# Patient Record
Sex: MALE | Race: WHITE | NOT HISPANIC OR LATINO | URBAN - METROPOLITAN AREA
[De-identification: names, ages, dates, MRNs, and addresses within clinical notes are randomized per-mention and may not be internally consistent; named-entity substitution may affect disease eponyms.]

---

## 2019-11-08 LAB
CHOLEST SERPL-MCNC: 250 MG/DL (ref 0–199)
HDLC SERPL-MCNC: 80 MG/DL
LDLC SERPL CALC-MCNC: 146 MG/DL (ref 0–130)
TRIGL SERPL-MCNC: 118 MG/DL (ref 0–149)

## 2020-02-12 ENCOUNTER — OFFICE VISIT (OUTPATIENT)
Dept: FAMILY MEDICINE | Facility: CLINIC | Age: 45
End: 2020-02-12

## 2020-02-12 VITALS
OXYGEN SATURATION: 99 % | RESPIRATION RATE: 16 BRPM | BODY MASS INDEX: 29.55 KG/M2 | HEIGHT: 73 IN | HEART RATE: 118 BPM | DIASTOLIC BLOOD PRESSURE: 82 MMHG | WEIGHT: 223 LBS | SYSTOLIC BLOOD PRESSURE: 146 MMHG

## 2020-02-12 DIAGNOSIS — B37.2 CANDIDIASIS OF SKIN: Primary | ICD-10-CM

## 2020-02-12 DIAGNOSIS — R10.32 ABDOMINAL PAIN, LEFT LOWER QUADRANT: ICD-10-CM

## 2020-02-12 LAB — KOH PREP: POSITIVE

## 2020-02-12 PROCEDURE — 99214 OFFICE O/P EST MOD 30 MIN: CPT | Mod: 25 | Performed by: NURSE PRACTITIONER

## 2020-02-12 PROCEDURE — 87220 TISSUE EXAM FOR FUNGI: CPT | Performed by: NURSE PRACTITIONER

## 2020-02-12 RX ORDER — KETOCONAZOLE 20 MG/ML
SHAMPOO TOPICAL DAILY PRN
Qty: 120 ML | Refills: 3 | Status: SHIPPED | OUTPATIENT
Start: 2020-02-12 | End: 2022-10-06

## 2020-02-12 RX ORDER — FLUCONAZOLE 200 MG/1
200 TABLET ORAL DAILY
Qty: 4 TABLET | Refills: 0 | Status: SHIPPED | OUTPATIENT
Start: 2020-02-12 | End: 2022-10-06

## 2020-02-12 ASSESSMENT — ANXIETY QUESTIONNAIRES
IF YOU CHECKED OFF ANY PROBLEMS ON THIS QUESTIONNAIRE, HOW DIFFICULT HAVE THESE PROBLEMS MADE IT FOR YOU TO DO YOUR WORK, TAKE CARE OF THINGS AT HOME, OR GET ALONG WITH OTHER PEOPLE: NOT DIFFICULT AT ALL
6. BECOMING EASILY ANNOYED OR IRRITABLE: NOT AT ALL
2. NOT BEING ABLE TO STOP OR CONTROL WORRYING: SEVERAL DAYS
7. FEELING AFRAID AS IF SOMETHING AWFUL MIGHT HAPPEN: NOT AT ALL
3. WORRYING TOO MUCH ABOUT DIFFERENT THINGS: SEVERAL DAYS
GAD7 TOTAL SCORE: 3
5. BEING SO RESTLESS THAT IT IS HARD TO SIT STILL: NOT AT ALL
1. FEELING NERVOUS, ANXIOUS, OR ON EDGE: SEVERAL DAYS

## 2020-02-12 ASSESSMENT — MIFFLIN-ST. JEOR: SCORE: 1950.4

## 2020-02-12 ASSESSMENT — PATIENT HEALTH QUESTIONNAIRE - PHQ9
5. POOR APPETITE OR OVEREATING: NOT AT ALL
SUM OF ALL RESPONSES TO PHQ QUESTIONS 1-9: 6

## 2020-02-12 NOTE — PROGRESS NOTES
"Problem(s) Oriented visit        SUBJECTIVE:                                                    Hector Ramirez is a 44 year old male who presents to clinic today for the following health issues :    Seen for open wound on scalp after surgery for hair transplant over one year ago. Pt. Reports the wound never healed well and continues to ooze blood and purulent fluid.Reports the are is tender to the touch and has been painful for the past year. No warmth or swelling, no fever, chills or other skin eruptions.     Pain in lower left abdominal quad accompanied by diarrhea, bloating and malaise, reports this has been recurrent for about 9 months, he has had some boughts of overindulging in food which seems to trigger the incidents.No changes in BM patterns, no constipation, no nausea or vomiting, no melena, no hematochezia,    No fevers, no chills.   No changes in appetite or intake; no dysphagia.  No new or worsened GERD or heartburn symptoms.      No urinary sx.       Problem list, Medication list, Allergies, and Medical/Social/Surgical histories reviewed in [facility] and updated as appropriate.   Additional history: as documented    ROS:  10 point ROS completed and negative except noted above, including Gen, HEENT, CV, Resp, GI, , MS, Neurologic, Psych    Histories:   There is no problem list on file for this patient.    No past surgical history on file.    Social History     Tobacco Use     Smoking status: Never Smoker     Smokeless tobacco: Never Used   Substance Use Topics     Alcohol use: Not on file     No family history on file.        OBJECTIVE:                                                    BP (!) 146/82   Pulse 118   Resp 16   Ht 1.854 m (6' 1\")   Wt 101.2 kg (223 lb)   SpO2 99%   BMI 29.42 kg/m    Body mass index is 29.42 kg/m .   APPEARANCE: = Relaxed and in no distress  Conj/Eyelids = noninjected and lids and lashes are without inflammation  PERRLA/Irises = Pupils Round Reactive to Light and Irisis " without inflammation  Heart Rate, Rhythm, & sounds (no Murm)  = Regular rate and rhythm with no S3, S4, or murmur appreciated.  Abdomen = Soft, tender to palpation in LLQ, no masses, & bowel sounds in all quadrants  Liver/Spleen = Normal span and no splenomegaly noted  SKIN: Unhealing wound / scar tissue on occipital scalp. Crusted with dark yellow exudate, positive for fungus on KOH. Purulent exudate beneath crusting scabs.    Recent/Remote Memory = Alert and Oriented x 3  Mood/Affect = Cooperative and interested     ASSESSMENT/PLAN:                                                        Hector was seen today for new patient, derm problem and abdominal pain.    Diagnoses and all orders for this visit:    Candidiasis of skin  -     fluconazole (DIFLUCAN) 200 MG tablet; Take 1 tablet (200 mg) by mouth daily  -     ketoconazole (NIZORAL) 2 % external shampoo; Apply topically daily as needed for itching or irritation  -     KOH (RMG)  -     MRSA MSSA Culture Screen ([facility])  I will send the wound for culture and let you know about any positive results. Please use the ketoconazole shampoo twice a week and take the fluconazole once a week for the next four weeks. Please check in with me after four weeks so we can assess how the treatment is going.     Abdominal pain, left lower quadrant  -     Referral to [facility]  The symptoms and exam appear to be most consistent with diverticulitis.    Review the pathophysiology and anatomical/mechanical issues of diverticulosis and diverticulitis.  At this time, an Abd CT scan is indicated.  Low residue diet for 2-3 weeks, then go towards higher fiber diet to prevent future recurrences.   Told to avoid smaller harder foods (e.g. nuts, popcorn, seeds) in the future.  Patient was told to follow up with [facility] for a CT ,we will call with results.     ASSESSMENT/PLAN:       The following health maintenance items are reviewed in Epic and correct as of today:  Health  Maintenance   Topic Date Due     PREVENTIVE CARE VISIT  1974     DTAP/TDAP/TD IMMUNIZATION (1 - Tdap) 05/20/1985     HIV SCREENING  05/20/1989     LIPID  05/20/2009     PHQ-2  01/01/2020     INFLUENZA VACCINE  Completed     IPV IMMUNIZATION  Aged Out     MENINGITIS IMMUNIZATION  Aged Out       Debi Olmos NP  [facility]  Family Practice  [facility]  [phone]    For any issues my office # is [phone]

## 2020-02-12 NOTE — PATIENT INSTRUCTIONS
Fluconazole Oral tablet  What is this medicine?  FLUCONAZOLE (floo ALBERTINA na zole) is an antifungal medicine. It is used to treat certain kinds of fungal or yeast infections.  This medicine may be used for other purposes; ask your health care provider or pharmacist if you have questions.  What should I tell my health care provider before I take this medicine?  They need to know if you have any of these conditions:    history of irregular heart beat    kidney disease    an unusual or allergic reaction to fluconazole, other azole antifungals, medicines, foods, dyes, or preservatives    pregnant or trying to get pregnant    breast-feeding  How should I use this medicine?  Take this medicine by mouth. Follow the directions on the prescription label. Do not take your medicine more often than directed.  Talk to your pediatrician regarding the use of this medicine in children. Special care may be needed. This medicine has been used in children as young as 6 months of age.  Overdosage: If you think you have taken too much of this medicine contact a poison control center or emergency room at once.  NOTE: This medicine is only for you. Do not share this medicine with others.  What if I miss a dose?  If you miss a dose, take it as soon as you can. If it is almost time for your next dose, take only that dose. Do not take double or extra doses.  What may interact with this medicine?  Do not take this medicine with any of the following medications:    astemizole    certain medicines for irregular heart beat like dofetilide, dronedarone, quinidine    cisapride    erythromycin    lomitapide    other medicines that prolong the QT interval (cause an abnormal heart rhythm)    pimozide    terfenadine    thioridazine    tolvaptan    ziprasidone  This medicine may also interact with the following medications:    antiviral medicines for HIV or AIDS    birth control pills    certain antibiotics like rifabutin, rifampin    certain  medicines for blood pressure like amlodipine, isradipine, felodipine, hydrochlorothiazide, losartan, nifedipine    certain medicines for cancer like cyclophosphamide, vinblastine, vincristine    certain medicines for cholesterol like atorvastatin, lovastatin, fluvastatin, simvastatin    certain medicines for depression, anxiety, or psychotic disturbances like amitriptyline, midazolam, nortriptyline, triazolam    certain medicines for diabetes like glipizide, glyburide, tolbutamide    certain medicines for pain like alfentanil, fentanyl, methadone    certain medicines for seizures like carbamazepine, phenytoin    certain medicines that treat or prevent blood clots like warfarin    halofantrine    medicines that lower your chance of fighting infection like cyclosporine, prednisone, tacrolimus    NSAIDS, medicines for pain and inflammation, like celecoxib, diclofenac, flurbiprofen, ibuprofen, meloxicam, naproxen    other medicines for fungal infections    sirolimus    theophylline    tofacitinib  This list may not describe all possible interactions. Give your health care provider a list of all the medicines, herbs, non-prescription drugs, or dietary supplements you use. Also tell them if you smoke, drink alcohol, or use illegal drugs. Some items may interact with your medicine.  What should I watch for while using this medicine?  Visit your doctor or health care professional for regular checkups. If you are taking this medicine for a long time you may need blood work. Tell your doctor if your symptoms do not improve. Some fungal infections need many weeks or months of treatment to cure.  Alcohol can increase possible damage to your liver. Avoid alcoholic drinks.  If you have a vaginal infection, do not have sex until you have finished your treatment. You can wear a sanitary napkin. Do not use tampons. Wear freshly washed cotton, not synthetic, panties.  What side effects may I notice from receiving this medicine?  Side  effects that you should report to your doctor or health care professional as soon as possible:    allergic reactions like skin rash or itching, hives, swelling of the lips, mouth, tongue, or throat    dark urine    feeling dizzy or faint    irregular heartbeat or chest pain    redness, blistering, peeling or loosening of the skin, including inside the mouth    trouble breathing    unusual bruising or bleeding    vomiting    yellowing of the eyes or skin  Side effects that usually do not require medical attention (report to your doctor or health care professional if they continue or are bothersome):    changes in how food tastes    diarrhea    headache    stomach upset or nausea  This list may not describe all possible side effects. Call your doctor for medical advice about side effects. You may report side effects to FDA at 2-070-FDA-3904.  Where should I keep my medicine?  Keep out of the reach of children.  Store at room temperature below 30 degrees C (86 degrees F). Throw away any medicine after the expiration date.  NOTE:This sheet is a summary. It may not cover all possible information. If you have questions about this medicine, talk to your doctor, pharmacist, or health care provider. Copyright  2016 Gold Standard

## 2020-02-13 ASSESSMENT — ANXIETY QUESTIONNAIRES: GAD7 TOTAL SCORE: 3

## 2020-02-15 LAB
ANTIMICROBIAL SUSCEPTIBILITY: ABNORMAL
Lab: ABNORMAL
MRSA SCREENING CULTURE: ABNORMAL

## 2020-02-17 ENCOUNTER — TELEPHONE (OUTPATIENT)
Dept: FAMILY MEDICINE | Facility: CLINIC | Age: 45
End: 2020-02-17

## 2020-02-17 DIAGNOSIS — B95.8 STAPH SKIN INFECTION: Primary | ICD-10-CM

## 2020-02-17 DIAGNOSIS — L08.9 STAPH SKIN INFECTION: Primary | ICD-10-CM

## 2020-02-17 RX ORDER — SULFAMETHOXAZOLE/TRIMETHOPRIM 800-160 MG
1 TABLET ORAL 2 TIMES DAILY
Qty: 28 TABLET | Refills: 0 | Status: SHIPPED | OUTPATIENT
Start: 2020-02-17 | End: 2020-03-02

## 2020-02-17 NOTE — TELEPHONE ENCOUNTER
Called patient with result of culture from scalp.  Informed him it showed a staph infection and fungal infection. A prescription was sent to his pharmacy for Bactrim DS.  Patient will take this and follow up with another appointment when he finishes it.

## 2020-02-17 NOTE — PROGRESS NOTES
[facility] Atten: PA department @ [phone]    Esa Field,   [facility]  [phone]

## 2020-02-21 ENCOUNTER — TRANSFERRED RECORDS (OUTPATIENT)
Dept: FAMILY MEDICINE | Facility: CLINIC | Age: 45
End: 2020-02-21

## 2020-11-16 ENCOUNTER — HEALTH MAINTENANCE LETTER (OUTPATIENT)
Age: 45
End: 2020-11-16

## 2020-11-20 ENCOUNTER — TRANSFERRED RECORDS (OUTPATIENT)
Dept: FAMILY MEDICINE | Facility: CLINIC | Age: 45
End: 2020-11-20

## 2020-12-28 ENCOUNTER — TRANSFERRED RECORDS (OUTPATIENT)
Dept: FAMILY MEDICINE | Facility: CLINIC | Age: 45
End: 2020-12-28

## 2021-09-18 ENCOUNTER — HEALTH MAINTENANCE LETTER (OUTPATIENT)
Age: 46
End: 2021-09-18

## 2021-12-02 ENCOUNTER — TRANSFERRED RECORDS (OUTPATIENT)
Dept: HEALTH INFORMATION MANAGEMENT | Facility: CLINIC | Age: 46
End: 2021-12-02

## 2022-01-08 ENCOUNTER — HEALTH MAINTENANCE LETTER (OUTPATIENT)
Age: 47
End: 2022-01-08

## 2022-10-05 ENCOUNTER — HOSPITAL ENCOUNTER (EMERGENCY)
Facility: CLINIC | Age: 47
Discharge: HOME | End: 2022-10-05
Payer: COMMERCIAL

## 2022-10-05 ENCOUNTER — APPOINTMENT (OUTPATIENT)
Dept: CT IMAGING | Facility: CLINIC | Age: 47
DRG: 439 | End: 2022-10-05
Attending: EMERGENCY MEDICINE
Payer: COMMERCIAL

## 2022-10-05 ENCOUNTER — OFFICE VISIT (OUTPATIENT)
Dept: URGENT CARE | Facility: URGENT CARE | Age: 47
End: 2022-10-05
Payer: COMMERCIAL

## 2022-10-05 VITALS
BODY MASS INDEX: 29.42 KG/M2 | OXYGEN SATURATION: 96 % | SYSTOLIC BLOOD PRESSURE: 110 MMHG | HEIGHT: 73 IN | TEMPERATURE: 98.6 F | DIASTOLIC BLOOD PRESSURE: 78 MMHG | HEART RATE: 117 BPM | WEIGHT: 222 LBS | RESPIRATION RATE: 16 BRPM

## 2022-10-05 DIAGNOSIS — R10.31 RLQ ABDOMINAL PAIN: ICD-10-CM

## 2022-10-05 DIAGNOSIS — R11.0 NAUSEA: Primary | ICD-10-CM

## 2022-10-05 PROBLEM — E78.00 PURE HYPERCHOLESTEROLEMIA: Status: ACTIVE | Noted: 2019-11-11

## 2022-10-05 PROBLEM — I10 ESSENTIAL HYPERTENSION: Status: ACTIVE | Noted: 2021-09-28

## 2022-10-05 PROBLEM — K58.2 IRRITABLE BOWEL SYNDROME WITH BOTH CONSTIPATION AND DIARRHEA: Status: ACTIVE | Noted: 2021-09-28

## 2022-10-05 PROBLEM — G47.00 INSOMNIA: Status: ACTIVE | Noted: 2021-09-28

## 2022-10-05 PROBLEM — F43.9 STRESS: Status: ACTIVE | Noted: 2021-09-28

## 2022-10-05 PROBLEM — Z78.9 HEPATITIS B IMMUNE: Status: ACTIVE | Noted: 2019-11-08

## 2022-10-05 LAB
ALBUMIN SERPL-MCNC: 3.5 G/DL (ref 3.4–5)
ALBUMIN UR-MCNC: 100 MG/DL
ALP SERPL-CCNC: 81 U/L (ref 40–150)
ALT SERPL W P-5'-P-CCNC: 194 U/L (ref 0–70)
ANION GAP SERPL CALCULATED.3IONS-SCNC: 7 MMOL/L (ref 3–14)
APPEARANCE UR: ABNORMAL
AST SERPL W P-5'-P-CCNC: 212 U/L (ref 0–45)
BASOPHILS # BLD AUTO: 0 10E3/UL (ref 0–0.2)
BASOPHILS NFR BLD AUTO: 0 %
BILIRUB SERPL-MCNC: 1.9 MG/DL (ref 0.2–1.3)
BILIRUB UR QL STRIP: ABNORMAL
BUN SERPL-MCNC: 14 MG/DL (ref 7–30)
CALCIUM SERPL-MCNC: 8.8 MG/DL (ref 8.5–10.1)
CHLORIDE BLD-SCNC: 99 MMOL/L (ref 94–109)
CO2 SERPL-SCNC: 29 MMOL/L (ref 20–32)
COLOR UR AUTO: ABNORMAL
CREAT SERPL-MCNC: 1.07 MG/DL (ref 0.66–1.25)
EOSINOPHIL # BLD AUTO: 0 10E3/UL (ref 0–0.7)
EOSINOPHIL NFR BLD AUTO: 0 %
ERYTHROCYTE [DISTWIDTH] IN BLOOD BY AUTOMATED COUNT: 12.8 % (ref 10–15)
GFR SERPL CREATININE-BSD FRML MDRD: 86 ML/MIN/1.73M2
GLUCOSE BLD-MCNC: 144 MG/DL (ref 70–99)
GLUCOSE UR STRIP-MCNC: 30 MG/DL
HCT VFR BLD AUTO: 48.9 % (ref 40–53)
HGB BLD-MCNC: 16.7 G/DL (ref 13.3–17.7)
HGB UR QL STRIP: NEGATIVE
IMM GRANULOCYTES # BLD: 0 10E3/UL
IMM GRANULOCYTES NFR BLD: 0 %
KETONES UR STRIP-MCNC: 20 MG/DL
LEUKOCYTE ESTERASE UR QL STRIP: NEGATIVE
LIPASE SERPL-CCNC: 1486 U/L (ref 73–393)
LYMPHOCYTES # BLD AUTO: 2 10E3/UL (ref 0.8–5.3)
LYMPHOCYTES NFR BLD AUTO: 23 %
MCH RBC QN AUTO: 33.2 PG (ref 26.5–33)
MCHC RBC AUTO-ENTMCNC: 34.2 G/DL (ref 31.5–36.5)
MCV RBC AUTO: 97 FL (ref 78–100)
MONOCYTES # BLD AUTO: 0.6 10E3/UL (ref 0–1.3)
MONOCYTES NFR BLD AUTO: 7 %
MUCOUS THREADS #/AREA URNS LPF: PRESENT /LPF
NEUTROPHILS # BLD AUTO: 5.9 10E3/UL (ref 1.6–8.3)
NEUTROPHILS NFR BLD AUTO: 70 %
NITRATE UR QL: NEGATIVE
NRBC # BLD AUTO: 0 10E3/UL
NRBC BLD AUTO-RTO: 0 /100
PH UR STRIP: 6 [PH] (ref 5–7)
PLATELET # BLD AUTO: 196 10E3/UL (ref 150–450)
POTASSIUM BLD-SCNC: 3.6 MMOL/L (ref 3.4–5.3)
PROT SERPL-MCNC: 7.2 G/DL (ref 6.8–8.8)
RBC # BLD AUTO: 5.03 10E6/UL (ref 4.4–5.9)
RBC URINE: 2 /HPF
SODIUM SERPL-SCNC: 135 MMOL/L (ref 133–144)
SP GR UR STRIP: 1.02 (ref 1–1.03)
UROBILINOGEN UR STRIP-MCNC: 2 MG/DL
WBC # BLD AUTO: 8.4 10E3/UL (ref 4–11)
WBC URINE: 7 /HPF

## 2022-10-05 PROCEDURE — 82040 ASSAY OF SERUM ALBUMIN: CPT | Performed by: EMERGENCY MEDICINE

## 2022-10-05 PROCEDURE — 250N000009 HC RX 250: Performed by: EMERGENCY MEDICINE

## 2022-10-05 PROCEDURE — 96374 THER/PROPH/DIAG INJ IV PUSH: CPT

## 2022-10-05 PROCEDURE — 250N000011 HC RX IP 250 OP 636: Performed by: EMERGENCY MEDICINE

## 2022-10-05 PROCEDURE — 83690 ASSAY OF LIPASE: CPT | Performed by: EMERGENCY MEDICINE

## 2022-10-05 PROCEDURE — 99207 PR INPT ADMISSION FROM CLINIC: CPT | Performed by: NURSE PRACTITIONER

## 2022-10-05 PROCEDURE — 96376 TX/PRO/DX INJ SAME DRUG ADON: CPT

## 2022-10-05 PROCEDURE — C9803 HOPD COVID-19 SPEC COLLECT: HCPCS

## 2022-10-05 PROCEDURE — 96361 HYDRATE IV INFUSION ADD-ON: CPT

## 2022-10-05 PROCEDURE — 81001 URINALYSIS AUTO W/SCOPE: CPT | Performed by: EMERGENCY MEDICINE

## 2022-10-05 PROCEDURE — 36415 COLL VENOUS BLD VENIPUNCTURE: CPT | Performed by: EMERGENCY MEDICINE

## 2022-10-05 PROCEDURE — 99285 EMERGENCY DEPT VISIT HI MDM: CPT | Mod: 25

## 2022-10-05 PROCEDURE — 83735 ASSAY OF MAGNESIUM: CPT | Performed by: INTERNAL MEDICINE

## 2022-10-05 PROCEDURE — 80053 COMPREHEN METABOLIC PANEL: CPT | Performed by: EMERGENCY MEDICINE

## 2022-10-05 PROCEDURE — 96375 TX/PRO/DX INJ NEW DRUG ADDON: CPT

## 2022-10-05 PROCEDURE — 84100 ASSAY OF PHOSPHORUS: CPT | Performed by: INTERNAL MEDICINE

## 2022-10-05 PROCEDURE — 74177 CT ABD & PELVIS W/CONTRAST: CPT

## 2022-10-05 PROCEDURE — 85025 COMPLETE CBC W/AUTO DIFF WBC: CPT | Performed by: EMERGENCY MEDICINE

## 2022-10-05 RX ORDER — IOPAMIDOL 755 MG/ML
112 INJECTION, SOLUTION INTRAVASCULAR ONCE
Status: COMPLETED | OUTPATIENT
Start: 2022-10-05 | End: 2022-10-05

## 2022-10-05 RX ORDER — ZOLPIDEM TARTRATE 10 MG/1
10 TABLET ORAL
COMMUNITY
Start: 2022-09-12 | End: 2022-12-06

## 2022-10-05 RX ORDER — METOPROLOL SUCCINATE 50 MG/1
50 TABLET, EXTENDED RELEASE ORAL AT BEDTIME
COMMUNITY
Start: 2022-08-01 | End: 2022-11-08

## 2022-10-05 RX ORDER — KETOROLAC TROMETHAMINE 15 MG/ML
15 INJECTION, SOLUTION INTRAMUSCULAR; INTRAVENOUS ONCE
Status: COMPLETED | OUTPATIENT
Start: 2022-10-05 | End: 2022-10-05

## 2022-10-05 RX ORDER — ONDANSETRON 2 MG/ML
4 INJECTION INTRAMUSCULAR; INTRAVENOUS ONCE
Status: COMPLETED | OUTPATIENT
Start: 2022-10-05 | End: 2022-10-05

## 2022-10-05 RX ORDER — ONDANSETRON 4 MG/1
4 TABLET, ORALLY DISINTEGRATING ORAL ONCE
Status: DISCONTINUED | OUTPATIENT
Start: 2022-10-05 | End: 2022-10-05

## 2022-10-05 RX ADMIN — ONDANSETRON 4 MG: 2 INJECTION INTRAMUSCULAR; INTRAVENOUS at 18:50

## 2022-10-05 RX ADMIN — KETOROLAC TROMETHAMINE 15 MG: 15 INJECTION, SOLUTION INTRAMUSCULAR; INTRAVENOUS at 18:51

## 2022-10-05 RX ADMIN — IOPAMIDOL 112 ML: 755 INJECTION, SOLUTION INTRAVENOUS at 21:39

## 2022-10-05 RX ADMIN — SODIUM CHLORIDE 72 ML: 9 INJECTION, SOLUTION INTRAVENOUS at 21:39

## 2022-10-05 RX ADMIN — ONDANSETRON 4 MG: 4 TABLET, ORALLY DISINTEGRATING ORAL at 16:46

## 2022-10-05 NOTE — ED NOTES
Rapid Assessment Note    History:   Hector Ramirez is a 47 year old male who presents with RLQ pain x2 days. Subjective fever and nausea. H/o kidney stones but this feels different. Pain radiates into groin but does not originate from there.     Exam:   General:  Alert, interactive  Cardiovascular:  Well perfused  Lungs:  No respiratory distress, no accessory muscle use  Neuro:  Moving all 4 extremities  Skin:  Warm, dry  Psych:  Normal affect    Plan of Care:   I evaluated the patient and developed an initial plan of care. I discussed this plan and explained that I, or one of my partners, would be returning to complete the evaluation.       10/5/2022  [employer]    Portions of this medical record were completed by a scribe. UPON MY REVIEW AND AUTHENTICATION BY ELECTRONIC SIGNATURE, this confirms (a) I performed the applicable clinical services, and (b) the record is accurate.        Elbert Rivas MD  10/05/22 9500

## 2022-10-05 NOTE — PROGRESS NOTES
"Chief Complaint   Patient presents with     Urgent Care     Abdominal Pain     Last night Pt took Ambien and metoprolol and has abdominal pain right on the belly.     SUBJECTIVE:  Hector Ramirez is a 47 year old male presenting with severe umbilical, RLQ abdominal pain, nausea, vomiting, clammy, shaky since last night. His symptoms are worsening, he could not eat anything today. BM 2 days ago. Has baseline IBS, no relevant surgical history. He actually went to the ER yesterday and left due to wait times.    No past medical history on file.  bismuth subsalicylate (PEPTO BISMOL) 262 MG/15ML suspension, Take 15 mLs by mouth every 6 hours as needed for indigestion  metoprolol succinate ER (TOPROL XL) 50 MG 24 hr tablet,   zolpidem (AMBIEN) 10 MG tablet, TAKE 1 TABLET BY MOUTH EVERY DAY AT BEDTIME. USE NO MORE THAN 4 DAYS A WEEK  fluconazole (DIFLUCAN) 200 MG tablet, Take 1 tablet (200 mg) by mouth daily  ketoconazole (NIZORAL) 2 % external shampoo, Apply topically daily as needed for itching or irritation    No current facility-administered medications on file prior to visit.    Social History     Tobacco Use     Smoking status: Never Smoker     Smokeless tobacco: Never Used   Substance Use Topics     Alcohol use: Not on file     No Known Allergies    Review of Systems   All systems negative except for those listed above in HPI.    OBJECTIVE:   /78   Pulse 117   Temp 98.6  F (37  C) (Oral)   Resp 16   Ht 1.854 m (6' 1\")   Wt 100.7 kg (222 lb)   SpO2 96%   BMI 29.29 kg/m       Physical Exam  Vitals reviewed.   Constitutional:       Appearance: Normal appearance. He is ill-appearing and diaphoretic.   HENT:      Head: Normocephalic and atraumatic.      Nose: Nose normal.      Mouth/Throat:      Mouth: Mucous membranes are moist.      Pharynx: Oropharynx is clear.   Eyes:      Extraocular Movements: Extraocular movements intact.      Conjunctiva/sclera: Conjunctivae normal.      Pupils: Pupils are equal, " round, and reactive to light.   Cardiovascular:      Rate and Rhythm: Normal rate.   Pulmonary:      Effort: Pulmonary effort is normal.   Abdominal:      General: Abdomen is flat. There is no distension.      Palpations: Abdomen is soft. There is no mass.      Tenderness: There is abdominal tenderness. There is no right CVA tenderness, left CVA tenderness, guarding or rebound.      Hernia: No hernia is present.   Musculoskeletal:         General: Normal range of motion.      Cervical back: Normal range of motion and neck supple.   Skin:     General: Skin is warm.      Coloration: Skin is pale.      Findings: No rash.   Neurological:      General: No focal deficit present.      Mental Status: He is alert and oriented to person, place, and time.   Psychiatric:         Mood and Affect: Mood normal.         Behavior: Behavior normal.       ASSESSMENT:    ICD-10-CM    1. Nausea  R11.0 ondansetron (ZOFRAN ODT) ODT tab 4 mg   2. RLQ abdominal pain  R10.31      PLAN:     Traiged to ER for higher level of care with concern for appendicitis  Pt has had severe umbilical, RLQ abdominal pain, nausea, vomiting, clammy since last night  Tachycardia, shaky, diaphoretic in clinic  Positive obturator, psoas, rosvings, mcburneys signs  Urgent care limited without stat labs, advanced imaging, IV fluids  Pt feels safe to go to local ER now by car, call 911 if severe worsening en route    Follow up with primary care provider with any problems, questions or concerns or if symptoms worsen or fail to improve. Patient agreed to plan and verbalized understanding.    Miya Richard, SAMMIE-BC  [facility] [place]

## 2022-10-05 NOTE — ED TRIAGE NOTES
Patient presents to ED with reports of RLQ and anhtony-umbilical pain since yesterday. Pt also reports feeling febrile with nausea. Patient pain radiates to right groin.      Triage Assessment     Row Name 10/05/22 [id number]       Triage Assessment (Adult)    Airway WDL WDL       Respiratory WDL    Respiratory WDL WDL       Skin Circulation/Temperature WDL    Skin Circulation/Temperature WDL WDL       Cardiac WDL    Cardiac WDL WDL;X;rhythm    Cardiac Rhythm tachycardic       Peripheral/Neurovascular WDL    Peripheral Neurovascular WDL WDL       Cognitive/Neuro/Behavioral WDL    Cognitive/Neuro/Behavioral WDL WDL

## 2022-10-05 NOTE — PATIENT INSTRUCTIONS
Traiged to ER for higher level of care with concern for appendicitis  Pt has had severe umbilical, RLQ abdominal pain, nausea, vomiting, clammy since last night  Tachycardia, shaky, diaphoretic in clinic  Positive obturator, psoas, rosvings, mcburneys signs  Urgent care limited without stat labs, advanced imaging, IV fluids  Pt feels safe to go to local ER now by car, call 911 if severe worsening en route

## 2022-10-06 ENCOUNTER — APPOINTMENT (OUTPATIENT)
Dept: ULTRASOUND IMAGING | Facility: CLINIC | Age: 47
DRG: 439 | End: 2022-10-06
Attending: EMERGENCY MEDICINE
Payer: COMMERCIAL

## 2022-10-06 ENCOUNTER — HOSPITAL ENCOUNTER (INPATIENT)
Facility: CLINIC | Age: 47
LOS: 2 days | Discharge: HOME | DRG: 439 | End: 2022-10-08
Attending: EMERGENCY MEDICINE | Admitting: INTERNAL MEDICINE
Payer: COMMERCIAL

## 2022-10-06 ENCOUNTER — TELEPHONE (OUTPATIENT)
Dept: BEHAVIORAL HEALTH | Facility: CLINIC | Age: 47
End: 2022-10-06

## 2022-10-06 DIAGNOSIS — K85.20 ALCOHOL-INDUCED ACUTE PANCREATITIS WITHOUT INFECTION OR NECROSIS: ICD-10-CM

## 2022-10-06 LAB
ALBUMIN SERPL-MCNC: 2.6 G/DL (ref 3.4–5)
ALP SERPL-CCNC: 57 U/L (ref 40–150)
ALT SERPL W P-5'-P-CCNC: 120 U/L (ref 0–70)
ANION GAP SERPL CALCULATED.3IONS-SCNC: 5 MMOL/L (ref 3–14)
AST SERPL W P-5'-P-CCNC: 109 U/L (ref 0–45)
BILIRUB DIRECT SERPL-MCNC: 0.5 MG/DL (ref 0–0.2)
BILIRUB SERPL-MCNC: 1.9 MG/DL (ref 0.2–1.3)
BUN SERPL-MCNC: 13 MG/DL (ref 7–30)
CALCIUM SERPL-MCNC: 7.9 MG/DL (ref 8.5–10.1)
CHLORIDE BLD-SCNC: 99 MMOL/L (ref 94–109)
CO2 SERPL-SCNC: 27 MMOL/L (ref 20–32)
CREAT SERPL-MCNC: 1.12 MG/DL (ref 0.66–1.25)
ERYTHROCYTE [DISTWIDTH] IN BLOOD BY AUTOMATED COUNT: 12.8 % (ref 10–15)
GFR SERPL CREATININE-BSD FRML MDRD: 81 ML/MIN/1.73M2
GLUCOSE BLD-MCNC: 128 MG/DL (ref 70–99)
HCT VFR BLD AUTO: 41.4 % (ref 40–53)
HGB BLD-MCNC: 14.2 G/DL (ref 13.3–17.7)
LIPASE SERPL-CCNC: 922 U/L (ref 73–393)
MAGNESIUM SERPL-MCNC: 1.9 MG/DL (ref 1.6–2.3)
MAGNESIUM SERPL-MCNC: 2 MG/DL (ref 1.6–2.3)
MCH RBC QN AUTO: 33.6 PG (ref 26.5–33)
MCHC RBC AUTO-ENTMCNC: 34.3 G/DL (ref 31.5–36.5)
MCV RBC AUTO: 98 FL (ref 78–100)
PHOSPHATE SERPL-MCNC: 2.1 MG/DL (ref 2.5–4.5)
PLATELET # BLD AUTO: 145 10E3/UL (ref 150–450)
POTASSIUM BLD-SCNC: 3.7 MMOL/L (ref 3.4–5.3)
PROT SERPL-MCNC: 5.8 G/DL (ref 6.8–8.8)
RBC # BLD AUTO: 4.22 10E6/UL (ref 4.4–5.9)
SARS-COV-2 RNA RESP QL NAA+PROBE: NEGATIVE
SODIUM SERPL-SCNC: 131 MMOL/L (ref 133–144)
WBC # BLD AUTO: 6.8 10E3/UL (ref 4–11)

## 2022-10-06 PROCEDURE — 250N000011 HC RX IP 250 OP 636: Performed by: EMERGENCY MEDICINE

## 2022-10-06 PROCEDURE — 99223 1ST HOSP IP/OBS HIGH 75: CPT | Mod: AI | Performed by: INTERNAL MEDICINE

## 2022-10-06 PROCEDURE — 76705 ECHO EXAM OF ABDOMEN: CPT

## 2022-10-06 PROCEDURE — 83690 ASSAY OF LIPASE: CPT | Performed by: INTERNAL MEDICINE

## 2022-10-06 PROCEDURE — 258N000003 HC RX IP 258 OP 636: Performed by: INTERNAL MEDICINE

## 2022-10-06 PROCEDURE — 80053 COMPREHEN METABOLIC PANEL: CPT | Performed by: INTERNAL MEDICINE

## 2022-10-06 PROCEDURE — 82248 BILIRUBIN DIRECT: CPT | Performed by: INTERNAL MEDICINE

## 2022-10-06 PROCEDURE — C9113 INJ PANTOPRAZOLE SODIUM, VIA: HCPCS | Performed by: INTERNAL MEDICINE

## 2022-10-06 PROCEDURE — 120N000001 HC R&B MED SURG/OB

## 2022-10-06 PROCEDURE — U0003 INFECTIOUS AGENT DETECTION BY NUCLEIC ACID (DNA OR RNA); SEVERE ACUTE RESPIRATORY SYNDROME CORONAVIRUS 2 (SARS-COV-2) (CORONAVIRUS DISEASE [COVID-19]), AMPLIFIED PROBE TECHNIQUE, MAKING USE OF HIGH THROUGHPUT TECHNOLOGIES AS DESCRIBED BY CMS-2020-01-R: HCPCS | Performed by: EMERGENCY MEDICINE

## 2022-10-06 PROCEDURE — 83735 ASSAY OF MAGNESIUM: CPT | Performed by: HOSPITALIST

## 2022-10-06 PROCEDURE — 85014 HEMATOCRIT: CPT | Performed by: INTERNAL MEDICINE

## 2022-10-06 PROCEDURE — 36415 COLL VENOUS BLD VENIPUNCTURE: CPT | Performed by: INTERNAL MEDICINE

## 2022-10-06 PROCEDURE — 250N000013 HC RX MED GY IP 250 OP 250 PS 637: Performed by: EMERGENCY MEDICINE

## 2022-10-06 PROCEDURE — 250N000013 HC RX MED GY IP 250 OP 250 PS 637: Performed by: INTERNAL MEDICINE

## 2022-10-06 PROCEDURE — HZ2ZZZZ DETOXIFICATION SERVICES FOR SUBSTANCE ABUSE TREATMENT: ICD-10-PCS | Performed by: INTERNAL MEDICINE

## 2022-10-06 PROCEDURE — C9113 INJ PANTOPRAZOLE SODIUM, VIA: HCPCS | Performed by: EMERGENCY MEDICINE

## 2022-10-06 PROCEDURE — 258N000003 HC RX IP 258 OP 636: Performed by: EMERGENCY MEDICINE

## 2022-10-06 PROCEDURE — 99207 PR NO BILLABLE SERVICE THIS VISIT: CPT | Performed by: HOSPITALIST

## 2022-10-06 PROCEDURE — 250N000011 HC RX IP 250 OP 636: Performed by: INTERNAL MEDICINE

## 2022-10-06 RX ORDER — ACETAMINOPHEN 650 MG/1
650 SUPPOSITORY RECTAL EVERY 6 HOURS PRN
Status: DISCONTINUED | OUTPATIENT
Start: 2022-10-06 | End: 2022-10-09 | Stop reason: HOSPADM

## 2022-10-06 RX ORDER — ONDANSETRON 2 MG/ML
4 INJECTION INTRAMUSCULAR; INTRAVENOUS EVERY 6 HOURS PRN
Status: DISCONTINUED | OUTPATIENT
Start: 2022-10-06 | End: 2022-10-09 | Stop reason: HOSPADM

## 2022-10-06 RX ORDER — NALOXONE HYDROCHLORIDE 0.4 MG/ML
0.2 INJECTION, SOLUTION INTRAMUSCULAR; INTRAVENOUS; SUBCUTANEOUS
Status: DISCONTINUED | OUTPATIENT
Start: 2022-10-06 | End: 2022-10-09 | Stop reason: HOSPADM

## 2022-10-06 RX ORDER — AMOXICILLIN 250 MG
2 CAPSULE ORAL 2 TIMES DAILY PRN
Status: CANCELLED | OUTPATIENT
Start: 2022-10-06

## 2022-10-06 RX ORDER — ONDANSETRON 2 MG/ML
4 INJECTION INTRAMUSCULAR; INTRAVENOUS EVERY 30 MIN PRN
Status: DISCONTINUED | OUTPATIENT
Start: 2022-10-06 | End: 2022-10-07

## 2022-10-06 RX ORDER — OLANZAPINE 5 MG/1
5-10 TABLET, ORALLY DISINTEGRATING ORAL EVERY 6 HOURS PRN
Status: DISCONTINUED | OUTPATIENT
Start: 2022-10-06 | End: 2022-10-09 | Stop reason: HOSPADM

## 2022-10-06 RX ORDER — HYDROMORPHONE HYDROCHLORIDE 1 MG/ML
0.5 INJECTION, SOLUTION INTRAMUSCULAR; INTRAVENOUS; SUBCUTANEOUS
Status: DISCONTINUED | OUTPATIENT
Start: 2022-10-06 | End: 2022-10-08

## 2022-10-06 RX ORDER — NALOXONE HYDROCHLORIDE 0.4 MG/ML
0.4 INJECTION, SOLUTION INTRAMUSCULAR; INTRAVENOUS; SUBCUTANEOUS
Status: DISCONTINUED | OUTPATIENT
Start: 2022-10-06 | End: 2022-10-09 | Stop reason: HOSPADM

## 2022-10-06 RX ORDER — PEPPERMINT OIL 90 MG
90 CAPSULE, DELAYED, AND EXTENDED RELEASE ORAL
COMMUNITY
End: 2024-06-12

## 2022-10-06 RX ORDER — HYDROMORPHONE HCL IN WATER/PF 6 MG/30 ML
0.2 PATIENT CONTROLLED ANALGESIA SYRINGE INTRAVENOUS
Status: DISCONTINUED | OUTPATIENT
Start: 2022-10-06 | End: 2022-10-08

## 2022-10-06 RX ORDER — LIDOCAINE 40 MG/G
CREAM TOPICAL
Status: DISCONTINUED | OUTPATIENT
Start: 2022-10-06 | End: 2022-10-09 | Stop reason: HOSPADM

## 2022-10-06 RX ORDER — POLYETHYLENE GLYCOL 3350 17 G/17G
17 POWDER, FOR SOLUTION ORAL 2 TIMES DAILY PRN
Status: DISCONTINUED | OUTPATIENT
Start: 2022-10-06 | End: 2022-10-09 | Stop reason: HOSPADM

## 2022-10-06 RX ORDER — AMOXICILLIN 250 MG
1-2 CAPSULE ORAL 2 TIMES DAILY
Status: DISCONTINUED | OUTPATIENT
Start: 2022-10-06 | End: 2022-10-09 | Stop reason: HOSPADM

## 2022-10-06 RX ORDER — SODIUM CHLORIDE, SODIUM LACTATE, POTASSIUM CHLORIDE, CALCIUM CHLORIDE 600; 310; 30; 20 MG/100ML; MG/100ML; MG/100ML; MG/100ML
125 INJECTION, SOLUTION INTRAVENOUS CONTINUOUS
Status: DISCONTINUED | OUTPATIENT
Start: 2022-10-06 | End: 2022-10-08

## 2022-10-06 RX ORDER — OXYCODONE HYDROCHLORIDE 5 MG/1
10 TABLET ORAL ONCE
Status: COMPLETED | OUTPATIENT
Start: 2022-10-06 | End: 2022-10-06

## 2022-10-06 RX ORDER — ONDANSETRON 4 MG/1
4 TABLET, ORALLY DISINTEGRATING ORAL EVERY 6 HOURS PRN
Status: DISCONTINUED | OUTPATIENT
Start: 2022-10-06 | End: 2022-10-09 | Stop reason: HOSPADM

## 2022-10-06 RX ORDER — AMOXICILLIN 250 MG
1 CAPSULE ORAL 2 TIMES DAILY PRN
Status: CANCELLED | OUTPATIENT
Start: 2022-10-06

## 2022-10-06 RX ORDER — DIAZEPAM 10 MG/2ML
5-10 INJECTION, SOLUTION INTRAMUSCULAR; INTRAVENOUS EVERY 30 MIN PRN
Status: DISCONTINUED | OUTPATIENT
Start: 2022-10-06 | End: 2022-10-09 | Stop reason: HOSPADM

## 2022-10-06 RX ORDER — FLUMAZENIL 0.1 MG/ML
0.2 INJECTION, SOLUTION INTRAVENOUS
Status: DISCONTINUED | OUTPATIENT
Start: 2022-10-06 | End: 2022-10-09 | Stop reason: HOSPADM

## 2022-10-06 RX ORDER — OXYCODONE HYDROCHLORIDE 5 MG/1
5 TABLET ORAL EVERY 4 HOURS PRN
Status: DISCONTINUED | OUTPATIENT
Start: 2022-10-06 | End: 2022-10-09 | Stop reason: HOSPADM

## 2022-10-06 RX ORDER — HALOPERIDOL 5 MG/ML
2.5-5 INJECTION INTRAMUSCULAR EVERY 6 HOURS PRN
Status: DISCONTINUED | OUTPATIENT
Start: 2022-10-06 | End: 2022-10-09 | Stop reason: HOSPADM

## 2022-10-06 RX ORDER — DIAZEPAM 5 MG
10 TABLET ORAL EVERY 30 MIN PRN
Status: DISCONTINUED | OUTPATIENT
Start: 2022-10-06 | End: 2022-10-09 | Stop reason: HOSPADM

## 2022-10-06 RX ORDER — FOLIC ACID 1 MG/1
1 TABLET ORAL DAILY
Status: DISCONTINUED | OUTPATIENT
Start: 2022-10-06 | End: 2022-10-09 | Stop reason: HOSPADM

## 2022-10-06 RX ORDER — ACETAMINOPHEN 325 MG/1
650 TABLET ORAL EVERY 6 HOURS PRN
Status: DISCONTINUED | OUTPATIENT
Start: 2022-10-06 | End: 2022-10-09 | Stop reason: HOSPADM

## 2022-10-06 RX ORDER — MULTIPLE VITAMINS W/ MINERALS TAB 9MG-400MCG
1 TAB ORAL DAILY
Status: DISCONTINUED | OUTPATIENT
Start: 2022-10-06 | End: 2022-10-09 | Stop reason: HOSPADM

## 2022-10-06 RX ADMIN — ACETAMINOPHEN 650 MG: 325 TABLET, FILM COATED ORAL at 11:13

## 2022-10-06 RX ADMIN — HYDROMORPHONE HYDROCHLORIDE 0.5 MG: 1 INJECTION, SOLUTION INTRAMUSCULAR; INTRAVENOUS; SUBCUTANEOUS at 02:28

## 2022-10-06 RX ADMIN — OXYCODONE HYDROCHLORIDE 5 MG: 5 TABLET ORAL at 10:01

## 2022-10-06 RX ADMIN — OXYCODONE HYDROCHLORIDE 5 MG: 5 TABLET ORAL at 14:10

## 2022-10-06 RX ADMIN — OXYCODONE HYDROCHLORIDE 5 MG: 5 TABLET ORAL at 19:19

## 2022-10-06 RX ADMIN — SODIUM CHLORIDE, POTASSIUM CHLORIDE, SODIUM LACTATE AND CALCIUM CHLORIDE 1000 ML: 600; 310; 30; 20 INJECTION, SOLUTION INTRAVENOUS at 02:26

## 2022-10-06 RX ADMIN — FOLIC ACID 1 MG: 1 TABLET ORAL at 10:44

## 2022-10-06 RX ADMIN — ONDANSETRON 4 MG: 2 INJECTION INTRAMUSCULAR; INTRAVENOUS at 02:27

## 2022-10-06 RX ADMIN — SODIUM CHLORIDE, POTASSIUM CHLORIDE, SODIUM LACTATE AND CALCIUM CHLORIDE 125 ML/HR: 600; 310; 30; 20 INJECTION, SOLUTION INTRAVENOUS at 03:32

## 2022-10-06 RX ADMIN — OXYCODONE HYDROCHLORIDE 10 MG: 5 TABLET ORAL at 00:17

## 2022-10-06 RX ADMIN — SENNOSIDES AND DOCUSATE SODIUM 2 TABLET: 50; 8.6 TABLET ORAL at 21:29

## 2022-10-06 RX ADMIN — THIAMINE HCL TAB 100 MG 100 MG: 100 TAB at 10:44

## 2022-10-06 RX ADMIN — PANTOPRAZOLE SODIUM 40 MG: 40 INJECTION, POWDER, FOR SOLUTION INTRAVENOUS at 10:44

## 2022-10-06 RX ADMIN — ACETAMINOPHEN 650 MG: 325 TABLET, FILM COATED ORAL at 16:54

## 2022-10-06 RX ADMIN — ACETAMINOPHEN 650 MG: 325 TABLET, FILM COATED ORAL at 23:33

## 2022-10-06 RX ADMIN — SODIUM CHLORIDE, POTASSIUM CHLORIDE, SODIUM LACTATE AND CALCIUM CHLORIDE 125 ML/HR: 600; 310; 30; 20 INJECTION, SOLUTION INTRAVENOUS at 19:23

## 2022-10-06 RX ADMIN — MULTIPLE VITAMINS W/ MINERALS TAB 1 TABLET: TAB at 10:43

## 2022-10-06 RX ADMIN — SENNOSIDES AND DOCUSATE SODIUM 1 TABLET: 50; 8.6 TABLET ORAL at 10:44

## 2022-10-06 RX ADMIN — SODIUM CHLORIDE, POTASSIUM CHLORIDE, SODIUM LACTATE AND CALCIUM CHLORIDE 125 ML/HR: 600; 310; 30; 20 INJECTION, SOLUTION INTRAVENOUS at 10:52

## 2022-10-06 RX ADMIN — PANTOPRAZOLE SODIUM 40 MG: 40 INJECTION, POWDER, FOR SOLUTION INTRAVENOUS at 01:39

## 2022-10-06 RX ADMIN — POLYETHYLENE GLYCOL 3350 17 G: 17 POWDER, FOR SOLUTION ORAL at 16:54

## 2022-10-06 RX ADMIN — OXYCODONE HYDROCHLORIDE 5 MG: 5 TABLET ORAL at 23:28

## 2022-10-06 ASSESSMENT — ACTIVITIES OF DAILY LIVING (ADL)
ADLS_ACUITY_SCORE: 35
TOILETING_ISSUES: NO
ADLS_ACUITY_SCORE: 18
DRESSING/BATHING_DIFFICULTY: NO
ADLS_ACUITY_SCORE: 18
ADLS_ACUITY_SCORE: 18
ADLS_ACUITY_SCORE: 35
ADLS_ACUITY_SCORE: 18
ADLS_ACUITY_SCORE: 35
FALL_HISTORY_WITHIN_LAST_SIX_MONTHS: NO
ADLS_ACUITY_SCORE: 18
ADLS_ACUITY_SCORE: 18
DOING_ERRANDS_INDEPENDENTLY_DIFFICULTY: NO
ADLS_ACUITY_SCORE: 35
CHANGE_IN_FUNCTIONAL_STATUS_SINCE_ONSET_OF_CURRENT_ILLNESS/INJURY: NO
DIFFICULTY_EATING/SWALLOWING: NO
WALKING_OR_CLIMBING_STAIRS_DIFFICULTY: NO
CONCENTRATING,_REMEMBERING_OR_MAKING_DECISIONS_DIFFICULTY: NO
WEAR_GLASSES_OR_BLIND: NO
ADLS_ACUITY_SCORE: 18

## 2022-10-06 ASSESSMENT — ENCOUNTER SYMPTOMS
ABDOMINAL PAIN: 1
VOMITING: 1
NAUSEA: 1

## 2022-10-06 NOTE — H&P
Admitted: 10/06/2022    CHIEF COMPLAINT:  Abdominal pain.    HISTORY OF PRESENT ILLNESS: Has been obtained from the patient, who is a good historian.  I discussed with ER attending, Dr. Sky, and I reviewed his chart as well.    Mr. Hector Ramirez is a pleasant 47-year-old gentleman who has past medical history of hypertension, dyslipidemia, IBS, insomnia, and alcohol use disorder, who presented for evaluation of abdominal pain.    The patient states that he started having abdominal pain on Tuesday night.  He reports that the pain as being located in the mid abdomen that later started radiating to the right side.  He describes the pain as a stabbing, constant, will get worse with deep breathing.  He reported the pain was 10/10 in intensity when it was more severe.  He reported associated nausea and vomiting.  He reported 3 episodes of emesis at home.  He denies any blood in the emesis.  He is not sure if he had a fever, but he did feel warm at home.  He denies chest pain, no shortness of breath.  He denies headache.  His last bowel movement was 2-3 days ago.  He denies dysuria, no leg swellings.    The patient has history of alcohol use disorder.  He had been in treatment before.  He continues to drink described as a binge drinking pattern.  He states that he went to a wedding over the weekend, he drank 7 cocktails.  He apparently had 2-3 drinks on prior night as well.  He does not have a history of pancreatitis.    In ER, he was seen by Dr. Sky.  His vitals showed a blood pressure of 125/77, heart rate 110, respiratory rate 14, oxygen saturation 99% on room air and temperature 99 degrees Fahrenheit.  He did have basic blood work, which showed unremarkable BMP with sodium of 135, potassium 3.6, chloride 99, bicarbonate 29, BUN 14, creatinine 1.07.  His calcium is 8.8, anion gap of 7, albumin 3.5, total protein 7.2, total bilirubin 1.9, alkaline phosphatase 81, , .  His lipase was elevated at  1486.  Glucose 144.  CBC with white blood cells 8.4, hemoglobin 16.7, hematocrit 48.9 and platelet count 196.  His UA was negative for nitrites.  It did show some white blood cells of 7, small bilirubin, and ketones of 20.  He was tested negative for COVID-19 infection.  He had a CT of the abdomen and pelvis, which showed evidence of fatty liver with wall thickening and inflammatory changes involving the second and third portion of the duodenum adjacent to the pancreatic head.  Findings could be secondary to his acute pancreatitis versus acute duodenitis/peptic ulcer disease.  There is some edema extending laterally from the duodenum into the right paracolic gutter and into the pelvis.  Ultrasound of the abdomen showed no gallstones or biliary dilation, just fatty infiltration of the liver.    In ER, he was given 1 dose of Toradol, one dose of Zofran, a bolus of lactated Ringer's, followed by lactated Ringer solution.  He was also given one dose of IV Protonix and 1 dose of oxycodone and hospitalist service was called regarding the admission.    PAST MEDICAL HISTORY:     1.  Hypertension.  2.  Hypercholesterolemia.  3.  Alcohol use disorder.  4.  Irritable bowel syndrome.  5.  Insomnia.    PAST SURGICAL HISTORY:   No past surgical history on file.    SOCIAL HISTORY:  He denies smoking.  He denies illicit drug abuse.  He does drink alcohol on a regular basis.  He had been in outpatient treatment before.  Currently, he meets with a counselor and he is planning for some outpatient treatment again.    FAMILY HISTORY:    Clotting Disorder Birth Mother 1       Thromboembolic Disease Birth Mother 1   protein c/s deficiency   Cancer, Breast Other    grandparent         [id number]-HG-[id number] MEDICATIONS:  Needs to be verified by the pharmacist.  metoprolol succinate ER (TOPROL XL) 50 MG 24 hr tablet Take 50 mg by mouth At Bedtime 10/4/2022 - but threw up afterward at hs Yes Reported, Patient Yes     Peppermint Oil (IBGARD) 90  MG CPCR Take 90 mg by mouth 2 times daily (before meals) For IBS 10/3/2022 Yes Unknown, Entered By History       zolpidem (AMBIEN) 10 MG tablet Take 10 mg by mouth nightly as needed for sleep Use no more than 4 days a week. 10/4/2022 - threw up afterward at hs Yes Reported, Patient              ALLERGIES:  NO KNOWN DRUG ALLERGIES.    REVIEW OF SYSTEMS:  A 10-point review of systems was conducted and it was negative except for pertinent positives mentioned in history of present illness.    PHYSICAL EXAMINATION:    VITAL SIGNS:  Blood pressure is 126/85, heart rate 100, respiratory rate 14, oxygen saturation 99% on room air and temperature 99 degrees Fahrenheit.  GENERAL:  The patient is awake, alert, no acute distress at the time of my examination, he is mildly tremulous though.  HEENT:  Normocephalic, atraumatic.  Pupils are equally round and reactive to light.  Oral mucosa is mildly dry.  NECK:  Supple, no cervical lymphadenopathy, no thyromegaly.  CHEST:  There is bilateral air entry.  No wheezing.  No rales, no crackles.  CARDIOVASCULAR:  There is normal S1 and S2, mild tachycardia, no murmurs, no rubs.  ABDOMEN:  Soft, nondistended, moderately tenderness to palpation in mid abdomen and right side.  No rebound, no guarding.  Bowel sounds are present.  EXTREMITIES:  There is no leg swelling, no calf tenderness, 2+ peripheral pulses are palpable.  SKIN:  Intact.  No rash, no cyanosis.  NEUROLOGIC:  The patient is awake, alert, oriented to self, place and time.  There are no focal neurological deficits.  He has mild tremor of his hands.  PSYCHIATRIC:  Normal mood, normal affect.  MUSCULOSKELETAL:  He moves all extremities freely.  There are no obvious joint deformities.    LABORATORY DATA:  Reviewed and dictated above.    IMPRESSION AND PLAN:  Mr. Hector Ramirez is a pleasant 47-year-old gentleman with past medical history of hypertension, alcohol use disorder, insomnia, irritable bowel syndrome, and  hypercholesterolemia, who presented for evaluation of abdominal pain, nausea and vomiting.    PLAN:     1.  Suspected alcohol-induced pancreatitis.  2.  Possible acute duodenitis versus peptic ulcer disease, likely alcohol-induced.  The patient does have history of alcohol abuse.  He did attend a wedding over the weekend and drank 7 cocktails.  Apparently, he drank more on the nights leading to that event.  He started having mid abdominal pain 2 nights ago associated with nausea and vomiting.  The pain is severe and described as a stabbing pain.  A CT of the abdomen and pelvis shows wall thickening and inflammatory changes involving the second and third portion of the duodenum adjacent to the pancreatic head could be secondary to either acute pancreatitis versus acute duodenitis or peptic ulcer disease.  There is evidence of a fatty liver.  Ultrasound of the abdomen does not show any gallstones.  His liver function tests are elevated with , , lipase is elevated as well as at 1486.  He received a few doses of Zofran in ER, 1 dose of Toradol and 1 dose of oxycodone.  He was feeling more comfortable at the time of my examination.  He is admitted by Hospitalist Service.  We will initiate conservative management with n.p.o., IV fluids, antiemetics p.r.n.  He received 1 dose of Protonix 40 mg IV in the ER. We will continue with Protonix IV daily for now given his CAT scan findings of possible duodenitis/gastric ulcer.  We will provide pain management.  Plan for now is to repeat his labs in the morning, including LFTs and a lipase.  I did discuss with him the importance of quitting alcohol drinking and he acknowledged this.    3.  Alcohol use disorder.   He states that he went to outpatient treatment before; however, he continues to drink.  He reports that he is seeing a counselor currently.  He did drink over the weekend, pretty heavily.  He seems to be mildly tremulous.  We will monitor him for alcohol  withdrawals.  He denies withdrawal symptoms in the past.  I started him on a CIWA protocol with symptom-triggered Valium.  We will check magnesium and replace electrolytes as per protocol.  He is getting IV fluids and start him on vitamins as well.  Chemical dependency consult requested.     4.  History of hypertension.  Apparently, he is on Toprol-XL.  We will resume his home medications once verified by the pharmacy.    5.  History of insomnia.  We will resume to admission Ambien  6.  DVT prophylaxis: Pneumatic compression device.    CODE STATUS:  Discussed with the patient.  The patient is full code.    DISPOSITION:  Admit inpatient.  I anticipate a couple of days of hospitalization.    Estefany Guthrie MD        D: 10/06/2022   T: 10/06/2022   MT: [id number]    Name:     AMADEO MESA  MRN:      [id number]        Account:     [id number]   :      1975           Admitted:    10/06/2022       Document: [id number]    cc:  VALDEZ LY CNP

## 2022-10-06 NOTE — ED NOTES
[facility]  ED Nurse Handoff Report    ED Chief complaint: Abdominal Pain      ED Diagnosis:   Final diagnoses:   Alcohol-induced acute pancreatitis without infection or necrosis       Code Status: Full Code    Allergies: No Known Allergies    Patient Story: Pt presents with RLQ pain and nausea that started today.  Focused Assessment:  Pt with RLQ pain with nausea. Pt has never had before but does admit to drinking alcohol and is looking at seeking treatment. Pt received 1L LR, 4mg zofran, and 0.5mg dilaudid which has helped his pain.    Treatments and/or interventions provided: see above  Patient's response to treatments and/or interventions: see aobve    To be done/followed up on inpatient unit:  none pending    Does this patient have any cognitive concerns?: none    Activity level - Baseline/Home:  Independent  Activity Level - Current:   Independent    Patient's Preferred language: English   Needed?: No    Isolation: None  Infection: Not Applicable  Patient tested for COVID 19 prior to admission: YES  Bariatric?: No    Vital Signs:   Vitals:    10/05/22 1835 10/05/22 1837 10/06/22 0136   BP:  (!) 152/83 125/77   Pulse: 114  110   Resp: 17  13   Temp:  99  F (37.2  C)    SpO2: 98%  99%       Cardiac Rhythm:     Was the PSS-3 completed:   Yes  What interventions are required if any?               Family Comments: None present.  OBS brochure/video discussed/provided to patient/family: N/A              Name of person given brochure if not patient: N/A              Relationship to patient: NA    For the majority of the shift this patient's behavior was Green.   Behavioral interventions performed were information.    ED NURSE PHONE NUMBER: [phone]

## 2022-10-06 NOTE — PROGRESS NOTES
[facility]    Medicine Progress Note - Hospitalist Service    Date of Admission:  10/6/2022    Assessment & Plan          Active Problems:    Alcohol-induced acute pancreatitis without infection or necrosis  Assessment-patient pain is slightly better after the pain management.  Patient is slightly tremulous and anxious, sweaty.  Plan-continue n.p.o., continue IV fluids.  Monitor blood work.    Alcohol withdraawal  Assessment-patient is in withdrawal symptoms   Plan-continue CIWA protocol with Diazepam.  Chemical dependancy is consulted.     Hyponatremia  Assessment-probably from alcohol  Plan-continue IV fluids and we will monitor daily blood work.    Elevated liver enzymes  Assessment-secondary to alcohol  Plan-monitor daily blood work     Diet: NPO for Medical/Clinical Reasons Except for: Meds    DVT Prophylaxis: None  Stein Catheter: Not present  Central Lines: None  Cardiac Monitoring: None  Code Status: Full Code      Disposition Plan      Expected Discharge Date: 10/09/2022                The patient's care was discussed with the Patient.    Chyna Goff MD  Hospitalist Service  [facility]  Securely message with the Vocera Web Console (learn more here)  Text page via [email or web address] Paging/Directory           ______________________________________________________________________    Interval History   Patient is admitted to the hospital with acute pancreatitis secondary to alcohol.  Patient he was nauseated and just received antiemetic and feels little better.  Patient pain is much improved.  He feels slightly sweaty and tremulous.    Data reviewed today: I reviewed all medications, new labs and imaging results over the last 24 hours.    Physical Exam   Vital Signs: Temp: 100.4  F (38  C) Temp src: Oral BP: 134/79 Pulse: 97   Resp: 18 SpO2: 100 % O2 Device: None (Room air)    Weight: 0 lbs 0 oz  Constitutional: He is awake, alert but anxious, sweaty  Eyes: Lids  and lashes normal, pupils equal, round and reactive to light, extra ocular muscles intact, sclera clear, conjunctiva normal  Respiratory: No increased work of breathing, good air exchange, clear to auscultation bilaterally, no crackles or wheezing  Cardiovascular: Normal apical impulse, regular rate and rhythm, normal S1 and S2, no S3 or S4, and no murmur noted  GI: No scars, normal bowel sounds, soft, non-distended, non-tender, no masses palpated, no hepatosplenomegally  Neurologic: Awake, alert, oriented to name, place and time.  Cranial nerves II-XII are grossly intact.  Motor is 5 out of 5 bilaterally.  Cerebellar finger to nose, heel to shin intact.  Sensory is intact.  Babinski down going, Romberg negative, and gait is normal.    Data   Recent Labs   Lab 10/06/22  0536 10/05/22  1849   WBC 6.8 8.4   HGB 14.2 16.7   MCV 98 97   * 196   * 135   POTASSIUM 3.7 3.6   CHLORIDE 99 99   CO2 27 29   BUN 13 14   CR 1.12 1.07   ANIONGAP 5 7   JUAN 7.9* 8.8   * 144*   ALBUMIN 2.6* 3.5   PROTTOTAL 5.8* 7.2   BILITOTAL 1.9* 1.9*   ALKPHOS 57 81   * 194*   * 212*   LIPASE 922* 1,486*     Recent Results (from the past 24 hour(s))   CT Abdomen Pelvis w Contrast    Narrative    EXAM: CT ABDOMEN PELVIS W CONTRAST  LOCATION: [facility]  DATE/TIME: 10/5/2022 9:52 PM    INDICATION: RLQ pain, fevers at home  COMPARISON: None.  TECHNIQUE: CT scan of the abdomen and pelvis was performed following injection of IV contrast. Multiplanar reformats were obtained. Dose reduction techniques were used.  CONTRAST: 112mL Isovue 370        FINDINGS:   LOWER CHEST: Normal.    HEPATOBILIARY: Diffuse fatty infiltration of the liver.    PANCREAS: Inflammatory changes along the medial and inferior margins of the pancreatic head adjacent to the inflamed duodenum. No localized fluid collections or evidence for abscess or pseudocyst formation.    SPLEEN: Normal.    ADRENAL GLANDS:  Normal.    KIDNEYS/BLADDER: No renal calculi or hydronephrosis.    BOWEL: Wall thickening and inflammatory changes involving the second and third portions of the duodenum. No evidence for obstruction. Edematous changes extend laterally along the right perirenal fascia and into the right paracolic gutter, along the   ascending colon and base of the cecum. Normal appendix.    LYMPH NODES: Normal.    VASCULATURE: Unremarkable.    PELVIC ORGANS: Small amount of fluid in the right paracolic gutter extends into the pelvis.    MUSCULOSKELETAL: Degenerative disc disease L3 and L5 levels.      Impression    IMPRESSION:   1.  Wall thickening and inflammatory changes involving the second and third portions of the duodenum, adjacent to the pancreatic head. Findings could be secondary to either acute pancreatitis versus acute duodenitis/peptic ulcer disease. Clinical and   laboratory correlation recommended.  2.  Edema extends laterally from the duodenum into the right paracolic gutter and into the pelvis.  3.  Fatty liver.   Abdomen US, limited (RUQ only)    Narrative    EXAM: US ABDOMEN LIMITED  LOCATION: [facility]  DATE/TIME: 10/6/2022 12:44 AM    INDICATION: Abnormal LFTs. Pancreatitis on CT. Right lower quadrant pain. Fever.  COMPARISON: CT 10/5/2022.  TECHNIQUE: Limited abdominal ultrasound.    FINDINGS:    GALLBLADDER: Normal. No gallstones, wall thickening, or pericholecystic fluid. Negative sonographic Erickson's sign.    BILE DUCTS: No biliary dilatation. The common duct measures 4 mm.    LIVER: Fatty infiltration. No focal mass.    RIGHT KIDNEY: No hydronephrosis.    PANCREAS: The visualized portions are normal.    No ascites.      Impression    IMPRESSION:  1.  No gallstone or biliary dilatation.  2.  Fatty infiltration of the liver.         Medications     lactated ringers 125 mL/hr (10/06/22 1052)       folic acid  1 mg Oral Daily     multivitamin w/minerals  1 tablet Oral Daily      pantoprazole  40 mg Intravenous Daily with breakfast     senna-docusate  1-2 tablet Oral BID     sodium chloride (PF)  3 mL Intracatheter Q8H     thiamine  100 mg Oral Daily

## 2022-10-06 NOTE — PHARMACY-ADMISSION MEDICATION HISTORY
Pharmacy Medication History  Admission medication history interview status for the 10/6/2022  admission is complete. See EPIC admission navigator for prior to admission medications     Location of Interview: Patient room  Medication history sources: Patient and Surescripts    Significant changes made to the medication list:      In the past week, patient estimated taking medication this percent of the time: 50-90% due to illness    Additional medication history information:       Medication reconciliation completed by provider prior to medication history? No    Time spent in this activity: 10 minutes    Prior to Admission medications    Medication Sig Last Dose Taking? Auth Provider Long Term End Date   metoprolol succinate ER (TOPROL XL) 50 MG 24 hr tablet Take 50 mg by mouth At Bedtime 10/4/2022 - but threw up afterward at hs Yes Reported, Patient Yes    Peppermint Oil (IBGARD) 90 MG CPCR Take 90 mg by mouth 2 times daily (before meals) For IBS 10/3/2022 Yes Unknown, Entered By History     zolpidem (AMBIEN) 10 MG tablet Take 10 mg by mouth nightly as needed for sleep Use no more than 4 days a week. 10/4/2022 - threw up afterward at hs Yes Reported, Patient         The information provided in this note is only as accurate as the sources available at the time of update(s)

## 2022-10-06 NOTE — ED PROVIDER NOTES
History   Chief Complaint:  Abdominal Pain       HPI   Hector Ramirez is a 47 year old male with history of hypertension who presents with abdominal pain that started last night at 2300 and radiates to his groin. His pain is worse with movement and rates his pain to be 5/10 in the ED. He has 3 episodes of vomiting yesterday. He has taken tylenol without relief. He was seen at urgent care earlier and was sent here. He reports that he went to a wedding last weekend and drank 4 vodka sodas. He had drank 2-3 drinks the nights leading up to that. He notes that he has gone thorough counseling for his alcohol consumption in the past. No history of pancreatitis.     Review of Systems   Constitutional: Negative for fever.   Respiratory: Negative for cough and shortness of breath.    Cardiovascular: Negative for chest pain.   Gastrointestinal: Positive for abdominal pain, nausea and vomiting.   All other systems reviewed and are negative.      Allergies:  The patient has no known allergies.     Medications:  Diflucan  Toprol  Ambien    Past Medical History:     Hypertension  Hepatitis B immune  Insomnia  IBS  Lumbago  Pure hypercholesteremia    Family History:    Birth mother - clotting disorder, thromboembolic disease    Social History:  The patient presents to the ED alone.  He drinks alcohol.   PCP: Debi Olmos       Physical Exam     Patient Vitals for the past 24 hrs:   BP Temp Pulse Resp SpO2   10/06/22 0136 125/77 -- 110 13 99 %   10/05/22 1837 (!) 152/83 99  F (37.2  C) -- -- --   10/05/22 1835 -- -- 114 17 98 %       Physical Exam  General: Appears well-developed and well-nourished.   Head: No signs of trauma.   CV: Normal rate and regular rhythm.    Resp: Effort normal and breath sounds normal. No respiratory distress.   GI: Soft. There is epigastric tenderness.  No rebound or guarding.  Normal bowel sounds.  No CVA tenderness.  MSK: Normal range of motion.   Neuro: The patient is alert and oriented. Speech  normal.  Skin: Skin is warm and dry. No rash noted.   Psych: normal mood and affect. behavior is normal.       Emergency Department Course     Imaging:  Abdomen US, limited (RUQ only)   Final Result   IMPRESSION:   1.  No gallstone or biliary dilatation.   2.  Fatty infiltration of the liver.            CT Abdomen Pelvis w Contrast   Final Result   IMPRESSION:    1.  Wall thickening and inflammatory changes involving the second and third portions of the duodenum, adjacent to the pancreatic head. Findings could be secondary to either acute pancreatitis versus acute duodenitis/peptic ulcer disease. Clinical and    laboratory correlation recommended.   2.  Edema extends laterally from the duodenum into the right paracolic gutter and into the pelvis.   3.  Fatty liver.        Report per radiology    Laboratory:  Labs Ordered and Resulted from Time of ED Arrival to Time of ED Departure   COMPREHENSIVE METABOLIC PANEL - Abnormal       Result Value    Sodium 135      Potassium 3.6      Chloride 99      Carbon Dioxide (CO2) 29      Anion Gap 7      Urea Nitrogen 14      Creatinine 1.07      Calcium 8.8      Glucose 144 (*)     Alkaline Phosphatase 81       (*)      (*)     Protein Total 7.2      Albumin 3.5      Bilirubin Total 1.9 (*)     GFR Estimate 86     ROUTINE UA WITH MICROSCOPIC REFLEX TO CULTURE - Abnormal    Color Urine Orange (*)     Appearance Urine Slightly Cloudy (*)     Glucose Urine 30  (*)     Bilirubin Urine Small (*)     Ketones Urine 20  (*)     Specific Gravity Urine 1.020      Blood Urine Negative      pH Urine 6.0      Protein Albumin Urine 100  (*)     Urobilinogen Urine 2.0      Nitrite Urine Negative      Leukocyte Esterase Urine Negative      Mucus Urine Present (*)     RBC Urine 2      WBC Urine 7 (*)    CBC WITH PLATELETS AND DIFFERENTIAL - Abnormal    WBC Count 8.4      RBC Count 5.03      Hemoglobin 16.7      Hematocrit 48.9      MCV 97      MCH 33.2 (*)     MCHC 34.2      RDW  12.8      Platelet Count 196      % Neutrophils 70      % Lymphocytes 23      % Monocytes 7      % Eosinophils 0      % Basophils 0      % Immature Granulocytes 0      NRBCs per 100 WBC 0      Absolute Neutrophils 5.9      Absolute Lymphocytes 2.0      Absolute Monocytes 0.6      Absolute Eosinophils 0.0      Absolute Basophils 0.0      Absolute Immature Granulocytes 0.0      Absolute NRBCs 0.0     LIPASE - Abnormal    Lipase 1,486 (*)    COVID-19 VIRUS (CORONAVIRUS) BY PCR          Emergency Department Course:       Reviewed:  I reviewed nursing notes, vitals, past medical history and Care Everywhere    Assessments:  0140 I obtained history and examined the patient as noted above and explained findings.     Consults:  0209 I spoke to Dr. Guthrie, hospitalist, who accepts the patient.    Interventions:  1850 Zofran 4 mg IV  1851 Toradol 15 mg IV  0017 Oxycodone 10 mg PO  0139 Protonix 40 mg IV  0226 Lactated ringers bolus 1L IV  0227 Zofran 4 mg IV  0228 Dilaudid 0.5 mg IV  0332 LR infusion 125 mL/hr IV    Disposition:  The patient was admitted to the hospital under the care of Dr. Guthrie.     Impression & Plan     Medical Decision Making:  Hector Ramirez is a 47-year-old gentleman presents due to abdominal pain.  Pain began the night prior and has persisted.  He has had some nausea but no vomiting.  Blood work and CT scan and ultrasound were obtained.  Patient was found to have findings consistent with pancreatitis.  Patient does report alcohol use and this is likely the etiology of the pancreatitis.  Patient was admitted to the hospitalist service.    Diagnosis:    ICD-10-CM    1. Alcohol-induced acute pancreatitis without infection or necrosis  K85.20        Scribe Disclosure:  Tamar COCHRAN, am serving as a scribe at 1:52 AM on 10/6/2022 to document services personally performed by Akash Sky MD based on my observations and the provider's statements to me.              Akash Sky MD  10/07/22  [id number]

## 2022-10-06 NOTE — H&P
"[facility]    History and Physical - Hospitalist Service       Date of Admission:  10/6/2022  Dictation #: [id number]  Brief Summary (see dictation for more details): Suspected alcohol-induced pancreatitis/duodenitis  - npo, iv fluids, PPI iv daily, antiemetics prn, pain management, repeat LFTs and lipase level in am; if not improving- consider GI consult  - CIWA potocol    Clinically Significant Risk Factors Present on Admission                    # Overweight: Estimated body mass index is 29.29 kg/m  as calculated from the following:    Height as of 10/5/22: 1.854 m (6' 1\").    Weight as of 10/5/22: 100.7 kg (222 lb).          Estefany Guthrie MD  Hospitalist Service  [facility]  Securely message with the Vocera Web Console (learn more here)  Text page via [employer] Paging/Directory       "

## 2022-10-06 NOTE — PLAN OF CARE
Orientations: AOx4,CIWA 5  Vitals/Pain: VSS. Pt c/o abd pain- PRN's given per eMAR and cold applied.  Tele: N/A  Lines/Drains: RUE PIV, LR @ 125/hr  Skin/Wounds: WDL  GI/: Last BM 10/3/22, voids spontaneously without difficulty  LS: Clear, diminished  Labs: Abnormal/Trends, Electrolyte Replacement- Potassium and magnesium protocols ran.  Ambulation/Assist: SBA  Sleep Quality:   Plan: IVF, pain control, protonix, antiemetics

## 2022-10-06 NOTE — TELEPHONE ENCOUNTER
From: Zeinab Rubin   Sent: Thursday, October 6, 2022 10:59 AM  To: [email or web address]  Subject: CD Resources      Substance Abuse Resources    It is recommended that you abstain from all mood altering chemicals. Please contact the sober support hotline (799-955-1031) as needed; phones are answered 24 hours a day, 7 days a week.     To schedule an appointment with [facility], please call our ONE ACCESS Line at [phone] for a virtual outpatient Substance Use or Mental Health assessment.    To access substance abuse treatment you must have an assessment completed within 30 days of starting any program. If you have private insurance, call the customer service number on the back of your insurance card to find an in-network substance abuse assessment. The ideal provider will be a treatment facility, licensed in the [facility].    For those with without insurance, you will need to contact your county of residency and apply for Direct Access: The following are phone numbers for each county.  .    [facility] [phone]    [facility] [phone]    [facility] [phone]    [facility] [phone]    [facility] [phone]    [facility] [phone]    [facility] [phone]    [place] - [phone]    If you are intoxicated, you may be required to detox at a detox facility before starting treatment. The following are detox facilities that you can self present to. All detox facilities are able to help you complete an assessment/rule 25 prior to discharge if you choose:      [facility]: [address] [place], [place], [address]  [phone]    [facility]: [address] [place], [place], [address]  [phone]    [facility]: [address], [place], [address] [phone]    [facility]: [address] [place], [place], [address] [phone]    [facility]: [address] [place], [place], [address]  [phone] [email or web address]    Ways to help cope with  sobriety:    Take prescribed medicines as scheduled    Keep follow-up appointments    Talk to others about your concerns    Get regular exercise    Practice deep breathing skills    Eat a healthy diet    Use community resources, including hotline numbers, [facility] crisis and support meetings    Stay sober and avoid places/people/things associated with substance use    Maintain a daily schedule/routine    Get at least 7-8 hours of sleep per night    Create a list 10--20 healthy activities that you can do that are enjoyable and do not involve substance use    Create daily goals (approx. 1-4 goals) per day and work to achieve them throughout the day    [place] [facility] ([facility]): [facility] connects people seeking recovery to resources that help foster and sustain long-term recovery. Whether you are seeking resources for treatment, transportation, housing, job training, education, health care or other pathways to recovery, [facility] is a great place to start. Phone: [phone]. www.[place]ImageTag (Great listing of all types of recovery and non-recovery related resources)    [employer]: https://www.E-Buy.org/    Alcoholics Anonymous: -800-ALCOHOL  HTTP://WWW.AA.ORG/  AA [place] (489-641-7449 or http://a[place].org)  AA [place] (720-987-5477 or www.aastpaul.org)    Narcotics Anonymous: 491.962.4097  www.nam[place].us.        Zeinab Rubin MA, [facility]  Chemical Dependency   [facility]  [address] [place] [address], [place] [address]   [email or web address]  [email or web address]   Office: [phone]  Fax: [phone]   *Working Remotely / Hours M-F 7:30-4  Employed by: [employer]

## 2022-10-07 LAB
ALBUMIN SERPL-MCNC: 2.3 G/DL (ref 3.4–5)
ALP SERPL-CCNC: 51 U/L (ref 40–150)
ALT SERPL W P-5'-P-CCNC: 71 U/L (ref 0–70)
ANION GAP SERPL CALCULATED.3IONS-SCNC: 7 MMOL/L (ref 3–14)
AST SERPL W P-5'-P-CCNC: 50 U/L (ref 0–45)
BASOPHILS # BLD AUTO: 0 10E3/UL (ref 0–0.2)
BASOPHILS NFR BLD AUTO: 0 %
BILIRUB SERPL-MCNC: 2.4 MG/DL (ref 0.2–1.3)
BUN SERPL-MCNC: 11 MG/DL (ref 7–30)
CALCIUM SERPL-MCNC: 8.5 MG/DL (ref 8.5–10.1)
CHLORIDE BLD-SCNC: 100 MMOL/L (ref 94–109)
CO2 SERPL-SCNC: 27 MMOL/L (ref 20–32)
CREAT SERPL-MCNC: 1.05 MG/DL (ref 0.66–1.25)
EOSINOPHIL # BLD AUTO: 0 10E3/UL (ref 0–0.7)
EOSINOPHIL NFR BLD AUTO: 0 %
ERYTHROCYTE [DISTWIDTH] IN BLOOD BY AUTOMATED COUNT: 13.2 % (ref 10–15)
GFR SERPL CREATININE-BSD FRML MDRD: 88 ML/MIN/1.73M2
GLUCOSE BLD-MCNC: 106 MG/DL (ref 70–99)
HCT VFR BLD AUTO: 38.6 % (ref 40–53)
HGB BLD-MCNC: 13.1 G/DL (ref 13.3–17.7)
IMM GRANULOCYTES # BLD: 0 10E3/UL
IMM GRANULOCYTES NFR BLD: 0 %
LIPASE SERPL-CCNC: 661 U/L (ref 73–393)
LYMPHOCYTES # BLD AUTO: 1.3 10E3/UL (ref 0.8–5.3)
LYMPHOCYTES NFR BLD AUTO: 18 %
MAGNESIUM SERPL-MCNC: 1.8 MG/DL (ref 1.6–2.3)
MCH RBC QN AUTO: 33.6 PG (ref 26.5–33)
MCHC RBC AUTO-ENTMCNC: 33.9 G/DL (ref 31.5–36.5)
MCV RBC AUTO: 99 FL (ref 78–100)
MONOCYTES # BLD AUTO: 0.8 10E3/UL (ref 0–1.3)
MONOCYTES NFR BLD AUTO: 11 %
NEUTROPHILS # BLD AUTO: 4.8 10E3/UL (ref 1.6–8.3)
NEUTROPHILS NFR BLD AUTO: 71 %
NRBC # BLD AUTO: 0 10E3/UL
NRBC BLD AUTO-RTO: 0 /100
PLATELET # BLD AUTO: 102 10E3/UL (ref 150–450)
POTASSIUM BLD-SCNC: 3.9 MMOL/L (ref 3.4–5.3)
PROCALCITONIN SERPL-MCNC: 0.49 NG/ML
PROT SERPL-MCNC: 5.8 G/DL (ref 6.8–8.8)
RBC # BLD AUTO: 3.9 10E6/UL (ref 4.4–5.9)
SODIUM SERPL-SCNC: 134 MMOL/L (ref 133–144)
WBC # BLD AUTO: 6.8 10E3/UL (ref 4–11)

## 2022-10-07 PROCEDURE — 81001 URINALYSIS AUTO W/SCOPE: CPT | Performed by: INTERNAL MEDICINE

## 2022-10-07 PROCEDURE — 80053 COMPREHEN METABOLIC PANEL: CPT | Performed by: HOSPITALIST

## 2022-10-07 PROCEDURE — 85025 COMPLETE CBC W/AUTO DIFF WBC: CPT | Performed by: HOSPITALIST

## 2022-10-07 PROCEDURE — 250N000013 HC RX MED GY IP 250 OP 250 PS 637: Performed by: HOSPITALIST

## 2022-10-07 PROCEDURE — 258N000003 HC RX IP 258 OP 636: Performed by: INTERNAL MEDICINE

## 2022-10-07 PROCEDURE — 250N000013 HC RX MED GY IP 250 OP 250 PS 637: Performed by: INTERNAL MEDICINE

## 2022-10-07 PROCEDURE — 36415 COLL VENOUS BLD VENIPUNCTURE: CPT | Performed by: HOSPITALIST

## 2022-10-07 PROCEDURE — 83735 ASSAY OF MAGNESIUM: CPT | Performed by: HOSPITALIST

## 2022-10-07 PROCEDURE — 250N000011 HC RX IP 250 OP 636: Performed by: INTERNAL MEDICINE

## 2022-10-07 PROCEDURE — 120N000001 HC R&B MED SURG/OB

## 2022-10-07 PROCEDURE — 99233 SBSQ HOSP IP/OBS HIGH 50: CPT | Performed by: HOSPITALIST

## 2022-10-07 PROCEDURE — C9113 INJ PANTOPRAZOLE SODIUM, VIA: HCPCS | Performed by: INTERNAL MEDICINE

## 2022-10-07 PROCEDURE — 84145 PROCALCITONIN (PCT): CPT | Performed by: INTERNAL MEDICINE

## 2022-10-07 PROCEDURE — 83690 ASSAY OF LIPASE: CPT | Performed by: HOSPITALIST

## 2022-10-07 RX ORDER — SIMETHICONE 80 MG
80 TABLET,CHEWABLE ORAL EVERY 6 HOURS PRN
Status: DISCONTINUED | OUTPATIENT
Start: 2022-10-07 | End: 2022-10-09 | Stop reason: HOSPADM

## 2022-10-07 RX ORDER — METOPROLOL SUCCINATE 50 MG/1
50 TABLET, EXTENDED RELEASE ORAL AT BEDTIME
Status: DISCONTINUED | OUTPATIENT
Start: 2022-10-07 | End: 2022-10-09 | Stop reason: HOSPADM

## 2022-10-07 RX ORDER — ZOLPIDEM TARTRATE 5 MG/1
10 TABLET ORAL
Status: DISCONTINUED | OUTPATIENT
Start: 2022-10-07 | End: 2022-10-09 | Stop reason: HOSPADM

## 2022-10-07 RX ADMIN — SENNOSIDES AND DOCUSATE SODIUM 2 TABLET: 50; 8.6 TABLET ORAL at 20:20

## 2022-10-07 RX ADMIN — SIMETHICONE 80 MG: 80 TABLET, CHEWABLE ORAL at 17:32

## 2022-10-07 RX ADMIN — MULTIPLE VITAMINS W/ MINERALS TAB 1 TABLET: TAB at 08:23

## 2022-10-07 RX ADMIN — OXYCODONE HYDROCHLORIDE 5 MG: 5 TABLET ORAL at 14:47

## 2022-10-07 RX ADMIN — SENNOSIDES AND DOCUSATE SODIUM 2 TABLET: 50; 8.6 TABLET ORAL at 08:23

## 2022-10-07 RX ADMIN — ACETAMINOPHEN 650 MG: 325 TABLET, FILM COATED ORAL at 20:20

## 2022-10-07 RX ADMIN — THIAMINE HCL TAB 100 MG 100 MG: 100 TAB at 08:23

## 2022-10-07 RX ADMIN — POLYETHYLENE GLYCOL 3350 17 G: 17 POWDER, FOR SOLUTION ORAL at 08:23

## 2022-10-07 RX ADMIN — PANTOPRAZOLE SODIUM 40 MG: 40 INJECTION, POWDER, FOR SOLUTION INTRAVENOUS at 08:24

## 2022-10-07 RX ADMIN — METOPROLOL SUCCINATE 50 MG: 50 TABLET, EXTENDED RELEASE ORAL at 22:40

## 2022-10-07 RX ADMIN — SIMETHICONE 80 MG: 80 TABLET, CHEWABLE ORAL at 11:55

## 2022-10-07 RX ADMIN — SODIUM CHLORIDE, POTASSIUM CHLORIDE, SODIUM LACTATE AND CALCIUM CHLORIDE 125 ML/HR: 600; 310; 30; 20 INJECTION, SOLUTION INTRAVENOUS at 04:28

## 2022-10-07 RX ADMIN — OXYCODONE HYDROCHLORIDE 5 MG: 5 TABLET ORAL at 04:27

## 2022-10-07 RX ADMIN — FOLIC ACID 1 MG: 1 TABLET ORAL at 08:23

## 2022-10-07 RX ADMIN — POLYETHYLENE GLYCOL 3350 17 G: 17 POWDER, FOR SOLUTION ORAL at 20:20

## 2022-10-07 RX ADMIN — SIMETHICONE 80 MG: 80 TABLET, CHEWABLE ORAL at 23:39

## 2022-10-07 RX ADMIN — OXYCODONE HYDROCHLORIDE 5 MG: 5 TABLET ORAL at 08:34

## 2022-10-07 RX ADMIN — OXYCODONE HYDROCHLORIDE 5 MG: 5 TABLET ORAL at 19:29

## 2022-10-07 ASSESSMENT — ACTIVITIES OF DAILY LIVING (ADL)
ADLS_ACUITY_SCORE: 18

## 2022-10-07 ASSESSMENT — ENCOUNTER SYMPTOMS
FEVER: 0
SHORTNESS OF BREATH: 0
COUGH: 0

## 2022-10-07 NOTE — PROGRESS NOTES
[facility]    Medicine Progress Note - Hospitalist Service    Date of Admission:  10/6/2022    Assessment & Plan          Active Problems:    Alcohol-induced acute pancreatitis without infection or necrosis  Assessment- patient is c/o pain in right sided abdomen.  Plan-lipase improving  Kept him on clear liquid diet.      Alcohol withdraawal  Assessment-patient is in withdrawal symptoms   Plan-continue CIWA protocol with Diazepam.  Chemical dependancy is consulted- agreed for outpatient     Hyponatremia  Assessment-probably from alcohol. Improving  Plan-continue IV fluids and we will monitor daily blood work.    Elevated liver enzymes  Assessment-secondary to alcohol  Plan-monitor daily blood work       Diet: Clear Liquid Diet    DVT Prophylaxis: None  Stein Catheter: Not present  Central Lines: None  Cardiac Monitoring: None  Code Status: Full Code      Disposition Plan      Expected Discharge Date: 10/08/2022                The patient's care was discussed with the Patient.    Chyna Goff MD  Hospitalist Service  [facility]  Securely message with the Vocera Web Console (learn more here)  Text page via [employer] Paging/Directory           ______________________________________________________________________    Interval History   Patient is admitted to the hospital with acute pancreatitis secondary to alcohol.  Patient has pain in right abdominal region. Denies nausea or emesis.  He described it as sore, bloating feeling, moderate in intensity.      Data reviewed today: I reviewed all medications, new labs and imaging results over the last 24 hours.    Physical Exam   Vital Signs: Temp: 99.4  F (37.4  C) Temp src: Oral BP: 137/81 Pulse: 109   Resp: 16 SpO2: 99 % O2 Device: None (Room air)    Weight: 224 lbs 10.38 oz  Constitutional: He is awake,in discomfort form pain, appeared SOB probably from pain.  Eyes: Lids and lashes normal, pupils equal, round and  reactive to light, extra ocular muscles intact, sclera clear, conjunctiva normal  Respiratory: good air exchange, clear to auscultation bilaterally, no crackles or wheezing  Cardiovascular: Normal apical impulse, regular rate and rhythm, normal S1 and S2, no S3 or S4, and no murmur noted  GI: No scars, normal bowel sounds, soft, non-distended,mild tenderness in right mid and lower quadrant, no guarding or rigidity, no masses palpated, no hepatosplenomegally  Neurologic: Awake, alert, oriented to name, place and time.  Cranial nerves II-XII are grossly intact.  Motor is 5 out of 5 bilaterally.  Cerebellar finger to nose, heel to shin intact.  Sensory is intact.  Babinski down going, Romberg negative, and gait is normal.    Data   Recent Labs   Lab 10/07/22  0709 10/06/22  0536 10/05/22  1849   WBC 6.8 6.8 8.4   HGB 13.1* 14.2 16.7   MCV 99 98 97   * 145* 196    131* 135   POTASSIUM 3.9 3.7 3.6   CHLORIDE 100 99 99   CO2 27 27 29   BUN 11 13 14   CR 1.05 1.12 1.07   ANIONGAP 7 5 7   JUAN 8.5 7.9* 8.8   * 128* 144*   ALBUMIN 2.3* 2.6* 3.5   PROTTOTAL 5.8* 5.8* 7.2   BILITOTAL 2.4* 1.9* 1.9*   ALKPHOS 51 57 81   ALT 71* 120* 194*   AST 50* 109* 212*   LIPASE 661* 922* 1,486*     No results found for this or any previous visit (from the past 24 hour(s)).  Medications     lactated ringers 125 mL/hr (10/07/22 0428)       folic acid  1 mg Oral Daily     metoprolol succinate ER  50 mg Oral At Bedtime     multivitamin w/minerals  1 tablet Oral Daily     pantoprazole  40 mg Intravenous Daily with breakfast     senna-docusate  1-2 tablet Oral BID     sodium chloride (PF)  3 mL Intracatheter Q8H     thiamine  100 mg Oral Daily

## 2022-10-07 NOTE — PROVIDER NOTIFICATION
MD Notification    Notified Person: MD    Notified Person Name: INGE Goff    Notification Date/Time: 10/7/22 10:50    Notification Interaction: I[employer] Messaging     Purpose of Notification: Pt requesting something for gas pain. Could we try simethicone?    Orders Received: awaiting orders.     Comments:

## 2022-10-07 NOTE — PROGRESS NOTES
A/O x4. VSS except increase in Temp. PRN Tylenol given x1. Up indep in RM/BR. LR infusing @ 125m/hr. CIWA score 2. PRN Oxy 5mg x1 for abd pain. Pt passing flatus, but fills constipated. PRN miralax given.

## 2022-10-07 NOTE — PROGRESS NOTES
A/O x4. VSS except elevated temp of 100.2. Ind in room. LR infusing 125 ml/hr. CIWA score 1. PRN Tylenol given for temperature with effect, PRN oxy given for abd pain. No BM reported yet from interventions made by previous shift. No c/o N/V.

## 2022-10-08 VITALS
BODY MASS INDEX: 31.76 KG/M2 | HEART RATE: 109 BPM | OXYGEN SATURATION: 100 % | WEIGHT: 240.74 LBS | TEMPERATURE: 100.5 F | RESPIRATION RATE: 16 BRPM | DIASTOLIC BLOOD PRESSURE: 87 MMHG | SYSTOLIC BLOOD PRESSURE: 131 MMHG

## 2022-10-08 LAB
ALBUMIN SERPL-MCNC: 2.3 G/DL (ref 3.4–5)
ALBUMIN UR-MCNC: 100 MG/DL
ALP SERPL-CCNC: 54 U/L (ref 40–150)
ALT SERPL W P-5'-P-CCNC: 59 U/L (ref 0–70)
ANION GAP SERPL CALCULATED.3IONS-SCNC: 7 MMOL/L (ref 3–14)
APPEARANCE UR: CLEAR
AST SERPL W P-5'-P-CCNC: 41 U/L (ref 0–45)
BASOPHILS # BLD AUTO: 0 10E3/UL (ref 0–0.2)
BASOPHILS NFR BLD AUTO: 0 %
BILIRUB SERPL-MCNC: 2.7 MG/DL (ref 0.2–1.3)
BILIRUB UR QL STRIP: ABNORMAL
BUN SERPL-MCNC: 7 MG/DL (ref 7–30)
CALCIUM SERPL-MCNC: 9 MG/DL (ref 8.5–10.1)
CHLORIDE BLD-SCNC: 97 MMOL/L (ref 94–109)
CO2 SERPL-SCNC: 28 MMOL/L (ref 20–32)
COLOR UR AUTO: ABNORMAL
CREAT SERPL-MCNC: 0.94 MG/DL (ref 0.66–1.25)
EOSINOPHIL # BLD AUTO: 0 10E3/UL (ref 0–0.7)
EOSINOPHIL NFR BLD AUTO: 0 %
ERYTHROCYTE [DISTWIDTH] IN BLOOD BY AUTOMATED COUNT: 13 % (ref 10–15)
GFR SERPL CREATININE-BSD FRML MDRD: >90 ML/MIN/1.73M2
GLUCOSE BLD-MCNC: 100 MG/DL (ref 70–99)
GLUCOSE UR STRIP-MCNC: NEGATIVE MG/DL
HCT VFR BLD AUTO: 37.4 % (ref 40–53)
HGB BLD-MCNC: 12.5 G/DL (ref 13.3–17.7)
HGB UR QL STRIP: ABNORMAL
IMM GRANULOCYTES # BLD: 0 10E3/UL
IMM GRANULOCYTES NFR BLD: 1 %
KETONES UR STRIP-MCNC: 100 MG/DL
LACTATE SERPL-SCNC: 0.7 MMOL/L (ref 0.7–2)
LEUKOCYTE ESTERASE UR QL STRIP: NEGATIVE
LIPASE SERPL-CCNC: 570 U/L (ref 73–393)
LYMPHOCYTES # BLD AUTO: 0.9 10E3/UL (ref 0.8–5.3)
LYMPHOCYTES NFR BLD AUTO: 16 %
MAGNESIUM SERPL-MCNC: 2 MG/DL (ref 1.6–2.3)
MCH RBC QN AUTO: 33.8 PG (ref 26.5–33)
MCHC RBC AUTO-ENTMCNC: 33.4 G/DL (ref 31.5–36.5)
MCV RBC AUTO: 101 FL (ref 78–100)
MONOCYTES # BLD AUTO: 0.9 10E3/UL (ref 0–1.3)
MONOCYTES NFR BLD AUTO: 16 %
MUCOUS THREADS #/AREA URNS LPF: PRESENT /LPF
NEUTROPHILS # BLD AUTO: 4 10E3/UL (ref 1.6–8.3)
NEUTROPHILS NFR BLD AUTO: 67 %
NITRATE UR QL: NEGATIVE
NRBC # BLD AUTO: 0 10E3/UL
NRBC BLD AUTO-RTO: 0 /100
PH UR STRIP: 6.5 [PH] (ref 5–7)
PLATELET # BLD AUTO: 97 10E3/UL (ref 150–450)
POTASSIUM BLD-SCNC: 3.7 MMOL/L (ref 3.4–5.3)
PROT SERPL-MCNC: 6 G/DL (ref 6.8–8.8)
RBC # BLD AUTO: 3.7 10E6/UL (ref 4.4–5.9)
RBC URINE: 1 /HPF
SODIUM SERPL-SCNC: 132 MMOL/L (ref 133–144)
SP GR UR STRIP: 1.02 (ref 1–1.03)
UROBILINOGEN UR STRIP-MCNC: 2 MG/DL
WBC # BLD AUTO: 5.9 10E3/UL (ref 4–11)
WBC URINE: 6 /HPF

## 2022-10-08 PROCEDURE — 250N000011 HC RX IP 250 OP 636: Performed by: INTERNAL MEDICINE

## 2022-10-08 PROCEDURE — 83690 ASSAY OF LIPASE: CPT | Performed by: HOSPITALIST

## 2022-10-08 PROCEDURE — 258N000003 HC RX IP 258 OP 636: Performed by: INTERNAL MEDICINE

## 2022-10-08 PROCEDURE — 99239 HOSP IP/OBS DSCHRG MGMT >30: CPT | Performed by: STUDENT IN AN ORGANIZED HEALTH CARE EDUCATION/TRAINING PROGRAM

## 2022-10-08 PROCEDURE — 87040 BLOOD CULTURE FOR BACTERIA: CPT | Performed by: INTERNAL MEDICINE

## 2022-10-08 PROCEDURE — 36415 COLL VENOUS BLD VENIPUNCTURE: CPT | Performed by: STUDENT IN AN ORGANIZED HEALTH CARE EDUCATION/TRAINING PROGRAM

## 2022-10-08 PROCEDURE — 250N000013 HC RX MED GY IP 250 OP 250 PS 637: Performed by: HOSPITALIST

## 2022-10-08 PROCEDURE — 83605 ASSAY OF LACTIC ACID: CPT | Performed by: STUDENT IN AN ORGANIZED HEALTH CARE EDUCATION/TRAINING PROGRAM

## 2022-10-08 PROCEDURE — C9113 INJ PANTOPRAZOLE SODIUM, VIA: HCPCS | Performed by: INTERNAL MEDICINE

## 2022-10-08 PROCEDURE — 250N000013 HC RX MED GY IP 250 OP 250 PS 637: Performed by: INTERNAL MEDICINE

## 2022-10-08 PROCEDURE — 83735 ASSAY OF MAGNESIUM: CPT | Performed by: INTERNAL MEDICINE

## 2022-10-08 PROCEDURE — 36415 COLL VENOUS BLD VENIPUNCTURE: CPT | Performed by: HOSPITALIST

## 2022-10-08 PROCEDURE — 80053 COMPREHEN METABOLIC PANEL: CPT | Performed by: HOSPITALIST

## 2022-10-08 PROCEDURE — 82040 ASSAY OF SERUM ALBUMIN: CPT | Performed by: HOSPITALIST

## 2022-10-08 PROCEDURE — 85025 COMPLETE CBC W/AUTO DIFF WBC: CPT | Performed by: HOSPITALIST

## 2022-10-08 RX ADMIN — SIMETHICONE 80 MG: 80 TABLET, CHEWABLE ORAL at 08:10

## 2022-10-08 RX ADMIN — THIAMINE HCL TAB 100 MG 100 MG: 100 TAB at 08:11

## 2022-10-08 RX ADMIN — POLYETHYLENE GLYCOL 3350 17 G: 17 POWDER, FOR SOLUTION ORAL at 08:11

## 2022-10-08 RX ADMIN — OXYCODONE HYDROCHLORIDE 5 MG: 5 TABLET ORAL at 02:46

## 2022-10-08 RX ADMIN — ACETAMINOPHEN 650 MG: 325 TABLET, FILM COATED ORAL at 18:10

## 2022-10-08 RX ADMIN — SENNOSIDES AND DOCUSATE SODIUM 2 TABLET: 50; 8.6 TABLET ORAL at 08:10

## 2022-10-08 RX ADMIN — FOLIC ACID 1 MG: 1 TABLET ORAL at 08:11

## 2022-10-08 RX ADMIN — SODIUM CHLORIDE, POTASSIUM CHLORIDE, SODIUM LACTATE AND CALCIUM CHLORIDE 125 ML/HR: 600; 310; 30; 20 INJECTION, SOLUTION INTRAVENOUS at 09:07

## 2022-10-08 RX ADMIN — OXYCODONE HYDROCHLORIDE 5 MG: 5 TABLET ORAL at 13:40

## 2022-10-08 RX ADMIN — PANTOPRAZOLE SODIUM 40 MG: 40 INJECTION, POWDER, FOR SOLUTION INTRAVENOUS at 08:11

## 2022-10-08 RX ADMIN — SIMETHICONE 80 MG: 80 TABLET, CHEWABLE ORAL at 18:10

## 2022-10-08 RX ADMIN — MULTIPLE VITAMINS W/ MINERALS TAB 1 TABLET: TAB at 08:11

## 2022-10-08 RX ADMIN — SODIUM CHLORIDE, POTASSIUM CHLORIDE, SODIUM LACTATE AND CALCIUM CHLORIDE 125 ML/HR: 600; 310; 30; 20 INJECTION, SOLUTION INTRAVENOUS at 00:39

## 2022-10-08 ASSESSMENT — ACTIVITIES OF DAILY LIVING (ADL)
ADLS_ACUITY_SCORE: 18

## 2022-10-08 NOTE — PROVIDER NOTIFICATION
"MD Notification    Notified Person: MD    Notified Person Name: Warren    Notification Date/Time:    Notification Interaction:    Purpose of Notification: \"2421 R.S. FYI pt has temp of 102.8. Denies any fever symptoms, PRN Tylenol given. *32456. Mesfin LIGHT.\"    Orders Received: orders put in for UA and metoprolol    Comments:    "

## 2022-10-08 NOTE — PROGRESS NOTES
[facility]    Medicine Progress Note - Hospitalist Service    Date of Admission:  10/6/2022  Date of Service: 10/08/2022    Assessment & Plan          Active Problems:    Alcohol-induced acute pancreatitis without infection or necrosis  Assessment- patient is c/o pain in right sided abdomen.  Plan:  - Lipase improving  - Kept him on clear liquid diet -> advance to fulls today  - Pain control PRN  - Follow vitals/temp  - Okay to discharge home this evening if tolerates low fat diet this evening, but recommended he stay overnight    Alcohol withdraawal  Assessment-patient is in withdrawal symptoms   Plan-continue CIWA protocol with Diazepam.  Chemical dependancy is consulted- agreed for outpatient     Hyponatremia  Assessment-probably from alcohol. Improving  Plan-continue IV fluids and we will monitor daily blood work.    Elevated liver enzymes  Assessment-secondary to alcohol  Plan:  - monitor daily blood work       Diet: Full Liquid Diet    DVT Prophylaxis: None  Stein Catheter: Not present  Central Lines: None  Cardiac Monitoring: None  Code Status: Full Code      Disposition Plan     Expected Discharge Date: 10/08/2022                The patient's care was discussed with the Bedside Nurse and Patient.    Maximiliano Nazario MD  Hospitalist Service  [facility]  Securely message with the Vocera Web Console (learn more here)  Text page via [employer] Paging/Directory           ______________________________________________________________________    Interval History      Patient still with pain in right abdominal region. Denies nausea or emesis.  He described it as sore, bloating feeling, moderate in intensity.  No fevers  No vomiting  No CP/SOB  No new complaints    Data reviewed today: I reviewed all medications, new labs and imaging results over the last 24 hours.    Physical Exam   Vital Signs: Temp: 100.4  F (38  C) Temp src: Oral BP: 126/73 Pulse: 101   Resp: 16 SpO2: 98  % O2 Device: None (Room air)    Weight: 240 lbs 11.88 oz  Constitutional: He is awake,in discomfort form pain, appeared SOB probably from pain.  Eyes: Lids and lashes normal, pupils equal, round and reactive to light, extra ocular muscles intact, sclera clear, conjunctiva normal  Respiratory: good air exchange, clear to auscultation bilaterally, no crackles or wheezing  Cardiovascular: Normal apical impulse, regular rate and rhythm, normal S1 and S2, no S3 or S4, and no murmur noted  GI: No scars, normal bowel sounds, soft, non-distended,mild tenderness in right mid and lower quadrant, no guarding or rigidity, no masses palpated, no hepatosplenomegally  Neurologic: Awake, alert, oriented to name, place and time.  Cranial nerves II-XII are grossly intact.  Motor is 5 out of 5 bilaterally.  Cerebellar finger to nose, heel to shin intact.  Sensory is intact.  Babinski down going, Romberg negative, and gait is normal.    Data   Recent Labs   Lab 10/08/22  0447 10/07/22  0709 10/06/22  0536   WBC 5.9 6.8 6.8   HGB 12.5* 13.1* 14.2   * 99 98   PLT 97* 102* 145*   * 134 131*   POTASSIUM 3.7 3.9 3.7   CHLORIDE 97 100 99   CO2 28 27 27   BUN 7 11 13   CR 0.94 1.05 1.12   ANIONGAP 7 7 5   JUAN 9.0 8.5 7.9*   * 106* 128*   ALBUMIN 2.3* 2.3* 2.6*   PROTTOTAL 6.0* 5.8* 5.8*   BILITOTAL 2.7* 2.4* 1.9*   ALKPHOS 54 51 57   ALT 59 71* 120*   AST 41 50* 109*   LIPASE 570* 661* 922*     No results found for this or any previous visit (from the past 24 hour(s)).  Medications       folic acid  1 mg Oral Daily     metoprolol succinate ER  50 mg Oral At Bedtime     multivitamin w/minerals  1 tablet Oral Daily     pantoprazole  40 mg Intravenous Daily with breakfast     senna-docusate  1-2 tablet Oral BID     sodium chloride (PF)  3 mL Intracatheter Q8H     thiamine  100 mg Oral Daily

## 2022-10-08 NOTE — PLAN OF CARE
Goal Outcome Evaluation:      A&Ox4, VSS on RA ex had temp 102.8 and was tachycardic. Denied fever symptoms. Temp and HR WNL after metoprolol and Tylenol given. Ind in room. No BM but passing flatus. IV infusing. Abdominal pain managed with PRN oxy. CIWA 1.

## 2022-10-09 NOTE — PLAN OF CARE
Patient alert and oriented, independent, tolerated full liquid and regular diet, discharged home to follow up with primary provider in 7 days.

## 2022-10-11 ENCOUNTER — PATIENT OUTREACH (OUTPATIENT)
Dept: CARE COORDINATION | Facility: CLINIC | Age: 47
End: 2022-10-11

## 2022-10-11 NOTE — PROGRESS NOTES
Connected Care Resource Center Contact  [facility]/Voicemail     Clinical Data: Transitional Care Management Outreach     Outreach attempted x 2.  Left message on patient's voicemail, providing [facility]'s 24/7 scheduling and nurse triage phone number [phone] ([phone]) for questions/concerns and/or to schedule an appt with an [facility] provider, if they do not have a PCP.      Plan:  [facility] will do no further outreaches at this time.       Purvi Blakely  Connected Care Resource Center, [facility]    *Connected Care Resource Team does NOT follow patient ongoing. Referrals are identified based on internal discharge reports and the outreach is to ensure patient has an understanding of their discharge instructions.

## 2022-10-12 NOTE — DISCHARGE SUMMARY
[facility]  Hospitalist Discharge Summary      Date of Admission:  10/6/2022  Date of Discharge:  10/12/2022  Discharging Provider: Maximiliano Nazario MD  Discharge Service: Hospitalist Service    Discharge Diagnoses     Alcohol-induced acute pancreatitis without infection or necrosis    Follow-ups Needed After Discharge   Follow-up Appointments     Follow-up and recommended labs and tests       Follow up with primary care provider, Ben Duval, within 7   days for hospital follow- up.  No follow up labs or test are needed.             Unresulted Labs Ordered in the Past 30 Days of this Admission     Date and Time Order Name Status Description    10/7/2022  9:09 PM Blood Culture Arm, Right Preliminary     10/7/2022  9:09 PM Blood Culture Arm, Left Preliminary         Discharge Disposition   Discharged to home  Condition at discharge: Stable    Hospital Course         Active Problems:    Alcohol-induced acute pancreatitis without infection or necrosis  Assessment- patient is c/o pain in right sided abdomen. Improved with IVF and supportive cares.  Plan:  - Lipase improving  - encouraged alcohol cessation    Alcohol withdraawal  Assessment-patient is in withdrawal symptoms   Plan  - Chemical dependancy is consulted- agreed for outpatient     Hyponatremia  Assessment-probably from alcohol. Improving  Plan-continue IV fluids and we will monitor daily blood work.     Elevated liver enzymes  Assessment-secondary to alcohol  Plan:  - monitor as outpatient    Consultations This Hospital Stay   CHEMICAL DEPENDENCY IP CONSULT    Code Status   Prior    Time Spent on this Encounter   I, Maximiliano Nazario MD, personally saw the patient today and spent greater than 30 minutes discharging this patient.       Maximiliano Nazario MD  [facility]  [address] [address]  Phone: [phone]  Fax:  [phone]  ______________________________________________________________________    Physical Exam   Vital Signs:                   Weight: 240 lbs 11.88 oz       Primary Care Physician   Ben Duval    Discharge Orders      Reason for your hospital stay    You had pancreatitis from alcohol use.     Follow-up and recommended labs and tests     Follow up with primary care provider, Ben Duval, within 7 days for hospital follow- up.  No follow up labs or test are needed.     Activity    Your activity upon discharge: activity as tolerated     Diet    Follow this diet upon discharge: Orders Placed This Encounter      Low Fat Diet       Significant Results and Procedures   Most Recent 3 CBC's:  Recent Labs   Lab Test 10/08/22  0447 10/07/22  0709 10/06/22  0536   WBC 5.9 6.8 6.8   HGB 12.5* 13.1* 14.2   * 99 98   PLT 97* 102* 145*     Most Recent 3 BMP's:  Recent Labs   Lab Test 10/08/22  0447 10/07/22  0709 10/06/22  0536   * 134 131*   POTASSIUM 3.7 3.9 3.7   CHLORIDE 97 100 99   CO2 28 27 27   BUN 7 11 13   CR 0.94 1.05 1.12   ANIONGAP 7 7 5   JUAN 9.0 8.5 7.9*   * 106* 128*     Most Recent 2 LFT's:  Recent Labs   Lab Test 10/08/22  0447 10/07/22  0709   AST 41 50*   ALT 59 71*   ALKPHOS 54 51   BILITOTAL 2.7* 2.4*     Most Recent 3 INR's:No lab results found.,   Results for orders placed or performed during the hospital encounter of 10/06/22   CT Abdomen Pelvis w Contrast    Narrative    EXAM: CT ABDOMEN PELVIS W CONTRAST  LOCATION: [facility]  DATE/TIME: 10/5/2022 9:52 PM    INDICATION: RLQ pain, fevers at home  COMPARISON: None.  TECHNIQUE: CT scan of the abdomen and pelvis was performed following injection of IV contrast. Multiplanar reformats were obtained. Dose reduction techniques were used.  CONTRAST: 112mL Isovue 370        FINDINGS:   LOWER CHEST: Normal.    HEPATOBILIARY: Diffuse fatty infiltration of the liver.    PANCREAS: Inflammatory  changes along the medial and inferior margins of the pancreatic head adjacent to the inflamed duodenum. No localized fluid collections or evidence for abscess or pseudocyst formation.    SPLEEN: Normal.    ADRENAL GLANDS: Normal.    KIDNEYS/BLADDER: No renal calculi or hydronephrosis.    BOWEL: Wall thickening and inflammatory changes involving the second and third portions of the duodenum. No evidence for obstruction. Edematous changes extend laterally along the right perirenal fascia and into the right paracolic gutter, along the   ascending colon and base of the cecum. Normal appendix.    LYMPH NODES: Normal.    VASCULATURE: Unremarkable.    PELVIC ORGANS: Small amount of fluid in the right paracolic gutter extends into the pelvis.    MUSCULOSKELETAL: Degenerative disc disease L3 and L5 levels.      Impression    IMPRESSION:   1.  Wall thickening and inflammatory changes involving the second and third portions of the duodenum, adjacent to the pancreatic head. Findings could be secondary to either acute pancreatitis versus acute duodenitis/peptic ulcer disease. Clinical and   laboratory correlation recommended.  2.  Edema extends laterally from the duodenum into the right paracolic gutter and into the pelvis.  3.  Fatty liver.   Abdomen US, limited (RUQ only)    Narrative    EXAM: US ABDOMEN LIMITED  LOCATION: [facility]  DATE/TIME: 10/6/2022 12:44 AM    INDICATION: Abnormal LFTs. Pancreatitis on CT. Right lower quadrant pain. Fever.  COMPARISON: CT 10/5/2022.  TECHNIQUE: Limited abdominal ultrasound.    FINDINGS:    GALLBLADDER: Normal. No gallstones, wall thickening, or pericholecystic fluid. Negative sonographic Erickson's sign.    BILE DUCTS: No biliary dilatation. The common duct measures 4 mm.    LIVER: Fatty infiltration. No focal mass.    RIGHT KIDNEY: No hydronephrosis.    PANCREAS: The visualized portions are normal.    No ascites.      Impression    IMPRESSION:  1.  No gallstone  or biliary dilatation.  2.  Fatty infiltration of the liver.             Discharge Medications   Discharge Medication List as of 10/8/2022  8:22 PM      CONTINUE these medications which have NOT CHANGED    Details   metoprolol succinate ER (TOPROL XL) 50 MG 24 hr tablet Take 50 mg by mouth At Bedtime, Historical      Peppermint Oil (IBGARD) 90 MG CPCR Take 90 mg by mouth 2 times daily (before meals) For IBS, Historical      zolpidem (AMBIEN) 10 MG tablet Take 10 mg by mouth nightly as needed for sleep Use no more than 4 days a week., Historical           Allergies   No Known Allergies

## 2022-10-13 LAB
BACTERIA BLD CULT: NO GROWTH
BACTERIA BLD CULT: NO GROWTH

## 2022-11-07 ASSESSMENT — ENCOUNTER SYMPTOMS
MYALGIAS: 1
ARTHRALGIAS: 1
NAUSEA: 0
COUGH: 0
DIARRHEA: 0
EYE PAIN: 0
FREQUENCY: 0
SORE THROAT: 0
WEAKNESS: 0
HEARTBURN: 0
NERVOUS/ANXIOUS: 0
HEMATURIA: 0
FEVER: 0
CHILLS: 0
SHORTNESS OF BREATH: 0
PALPITATIONS: 0
CONSTIPATION: 0
DIZZINESS: 0
PARESTHESIAS: 0
DYSURIA: 0
ABDOMINAL PAIN: 0
HEADACHES: 0
HEMATOCHEZIA: 0
JOINT SWELLING: 0

## 2022-11-08 ENCOUNTER — OFFICE VISIT (OUTPATIENT)
Dept: FAMILY MEDICINE | Facility: CLINIC | Age: 47
End: 2022-11-08
Payer: COMMERCIAL

## 2022-11-08 VITALS
HEART RATE: 94 BPM | WEIGHT: 222 LBS | BODY MASS INDEX: 30.07 KG/M2 | TEMPERATURE: 97.7 F | RESPIRATION RATE: 16 BRPM | SYSTOLIC BLOOD PRESSURE: 144 MMHG | DIASTOLIC BLOOD PRESSURE: 92 MMHG | OXYGEN SATURATION: 100 % | HEIGHT: 72 IN

## 2022-11-08 DIAGNOSIS — Z00.00 ROUTINE GENERAL MEDICAL EXAMINATION AT A HEALTH CARE FACILITY: Primary | ICD-10-CM

## 2022-11-08 DIAGNOSIS — Z13.29 SCREENING FOR THYROID DISORDER: ICD-10-CM

## 2022-11-08 DIAGNOSIS — F41.1 GAD (GENERALIZED ANXIETY DISORDER): ICD-10-CM

## 2022-11-08 DIAGNOSIS — I10 ESSENTIAL HYPERTENSION: ICD-10-CM

## 2022-11-08 DIAGNOSIS — Z12.11 SCREEN FOR COLON CANCER: ICD-10-CM

## 2022-11-08 DIAGNOSIS — Z13.220 LIPID SCREENING: ICD-10-CM

## 2022-11-08 DIAGNOSIS — M25.50 MULTIPLE JOINT PAIN: ICD-10-CM

## 2022-11-08 DIAGNOSIS — Z11.4 SCREENING FOR HIV (HUMAN IMMUNODEFICIENCY VIRUS): ICD-10-CM

## 2022-11-08 DIAGNOSIS — Z11.59 NEED FOR HEPATITIS C SCREENING TEST: ICD-10-CM

## 2022-11-08 DIAGNOSIS — K85.20 ALCOHOL-INDUCED ACUTE PANCREATITIS WITHOUT INFECTION OR NECROSIS: ICD-10-CM

## 2022-11-08 LAB
ALBUMIN SERPL-MCNC: 3.8 G/DL (ref 3.4–5)
ALP SERPL-CCNC: 60 U/L (ref 40–150)
ALT SERPL W P-5'-P-CCNC: 30 U/L (ref 0–70)
ANION GAP SERPL CALCULATED.3IONS-SCNC: 9 MMOL/L (ref 3–14)
AST SERPL W P-5'-P-CCNC: 30 U/L (ref 0–45)
BILIRUB SERPL-MCNC: 0.6 MG/DL (ref 0.2–1.3)
BUN SERPL-MCNC: 16 MG/DL (ref 7–30)
CALCIUM SERPL-MCNC: 9.7 MG/DL (ref 8.5–10.1)
CHLORIDE BLD-SCNC: 106 MMOL/L (ref 94–109)
CHOLEST SERPL-MCNC: 256 MG/DL
CO2 SERPL-SCNC: 25 MMOL/L (ref 20–32)
CREAT SERPL-MCNC: 0.99 MG/DL (ref 0.66–1.25)
FASTING STATUS PATIENT QL REPORTED: YES
GFR SERPL CREATININE-BSD FRML MDRD: >90 ML/MIN/1.73M2
GLUCOSE BLD-MCNC: 101 MG/DL (ref 70–99)
HDLC SERPL-MCNC: 75 MG/DL
LDLC SERPL CALC-MCNC: 157 MG/DL
LIPASE SERPL-CCNC: 90 U/L (ref 13–60)
NONHDLC SERPL-MCNC: 181 MG/DL
POTASSIUM BLD-SCNC: 4 MMOL/L (ref 3.4–5.3)
PROT SERPL-MCNC: 7.2 G/DL (ref 6.8–8.8)
SODIUM SERPL-SCNC: 140 MMOL/L (ref 133–144)
TRIGL SERPL-MCNC: 118 MG/DL
TSH SERPL DL<=0.005 MIU/L-ACNC: 0.81 MU/L (ref 0.4–4)

## 2022-11-08 PROCEDURE — 99214 OFFICE O/P EST MOD 30 MIN: CPT | Mod: 25 | Performed by: NURSE PRACTITIONER

## 2022-11-08 PROCEDURE — 80053 COMPREHEN METABOLIC PANEL: CPT | Performed by: NURSE PRACTITIONER

## 2022-11-08 PROCEDURE — 84443 ASSAY THYROID STIM HORMONE: CPT | Performed by: NURSE PRACTITIONER

## 2022-11-08 PROCEDURE — 36415 COLL VENOUS BLD VENIPUNCTURE: CPT | Performed by: NURSE PRACTITIONER

## 2022-11-08 PROCEDURE — 87389 HIV-1 AG W/HIV-1&-2 AB AG IA: CPT | Performed by: NURSE PRACTITIONER

## 2022-11-08 PROCEDURE — 83690 ASSAY OF LIPASE: CPT | Performed by: NURSE PRACTITIONER

## 2022-11-08 PROCEDURE — 86803 HEPATITIS C AB TEST: CPT | Performed by: NURSE PRACTITIONER

## 2022-11-08 PROCEDURE — 99396 PREV VISIT EST AGE 40-64: CPT | Performed by: NURSE PRACTITIONER

## 2022-11-08 PROCEDURE — 80061 LIPID PANEL: CPT | Performed by: NURSE PRACTITIONER

## 2022-11-08 PROCEDURE — 86618 LYME DISEASE ANTIBODY: CPT | Performed by: NURSE PRACTITIONER

## 2022-11-08 RX ORDER — METOPROLOL SUCCINATE 50 MG/1
50 TABLET, EXTENDED RELEASE ORAL AT BEDTIME
Qty: 90 TABLET | Refills: 3 | Status: SHIPPED | OUTPATIENT
Start: 2022-11-08 | End: 2023-12-11

## 2022-11-08 RX ORDER — HYDROCHLOROTHIAZIDE 12.5 MG/1
12.5 TABLET ORAL DAILY
Qty: 90 TABLET | Refills: 1 | Status: SHIPPED | OUTPATIENT
Start: 2022-11-08 | End: 2022-12-06

## 2022-11-08 RX ORDER — ESCITALOPRAM OXALATE 10 MG/1
10 TABLET ORAL DAILY
Qty: 90 TABLET | Refills: 1 | Status: SHIPPED | OUTPATIENT
Start: 2022-11-08 | End: 2022-12-06

## 2022-11-08 ASSESSMENT — ENCOUNTER SYMPTOMS
DIARRHEA: 0
HEARTBURN: 0
FEVER: 0
NERVOUS/ANXIOUS: 0
JOINT SWELLING: 0
MYALGIAS: 1
NAUSEA: 0
DIZZINESS: 0
FREQUENCY: 0
SORE THROAT: 0
DYSURIA: 0
CONSTIPATION: 0
ARTHRALGIAS: 1
PARESTHESIAS: 0
ABDOMINAL PAIN: 0
HEMATURIA: 0
CHILLS: 0
SHORTNESS OF BREATH: 0
PALPITATIONS: 0
HEMATOCHEZIA: 0
WEAKNESS: 0
COUGH: 0
HEADACHES: 0
EYE PAIN: 0

## 2022-11-08 NOTE — PROGRESS NOTES
SUBJECTIVE:   CC: Hector is an 47 year old who presents for preventative health visit.     Patient has been advised of split billing requirements and indicates understanding: Yes  Healthy Habits:     Getting at least 3 servings of Calcium per day:  Yes    Bi-annual eye exam:  NO    Dental care twice a year:  Yes    Sleep apnea or symptoms of sleep apnea:  None    Diet:  Regular (no restrictions)    Frequency of exercise:  6-7 days/week    Duration of exercise:  30-45 minutes    Taking medications regularly:  Yes    Medication side effects:  None    PHQ-2 Total Score: 2    Additional concerns today:  Yes        Today's PHQ-2 Score:   PHQ-2 ( 1999 [employer]) 11/7/2022   Q1: Little interest or pleasure in doing things 1   Q2: Feeling down, depressed or hopeless 1   PHQ-2 Score 2   Q1: Little interest or pleasure in doing things Several days   Q2: Feeling down, depressed or hopeless Several days   PHQ-2 Score 2       Abuse: Current or Past(Physical, Sexual or Emotional)- No  Do you feel safe in your environment? Yes    Have you ever done Advance Care Planning? (For example, a Health Directive, POLST, or a discussion with a medical provider or your loved ones about your wishes): No, advance care planning information given to patient to review.  Patient declined advance care planning discussion at this time.    Social History     Tobacco Use     Smoking status: Never     Smokeless tobacco: Never   Substance Use Topics     Alcohol use: Not Currently         Alcohol Use 11/7/2022   Prescreen: >3 drinks/day or >7 drinks/week? No       Last PSA: No results found for: PSA    Reviewed orders with patient. Reviewed health maintenance and updated orders accordingly - Yes  Lab work is in process    Reviewed and updated as needed this visit by clinical staff   Tobacco  Allergies  Meds   Med Hx  Surg Hx  Fam Hx  Soc Hx        Reviewed and updated as needed this visit by Provider                 Recent hospitalization for  "alcohol-induced pancreatitis.  Has reduced drinking by 90-95%.    Stress has been an issue, started therapy.  Had an intake, was placed more on the depressed side of symptoms.  Sleep can also be disrupted due to perseveration.      Review of Systems   Constitutional: Negative for chills and fever.   HENT: Negative for congestion, ear pain, hearing loss and sore throat.    Eyes: Negative for pain and visual disturbance.   Respiratory: Negative for cough and shortness of breath.    Cardiovascular: Negative for chest pain, palpitations and peripheral edema.   Gastrointestinal: Negative for abdominal pain, constipation, diarrhea, heartburn, hematochezia and nausea.   Genitourinary: Negative for dysuria, frequency, genital sores, hematuria, impotence, penile discharge and urgency.   Musculoskeletal: Positive for arthralgias and myalgias. Negative for joint swelling.   Skin: Negative for rash.   Neurological: Negative for dizziness, weakness, headaches and paresthesias.   Psychiatric/Behavioral: Negative for mood changes. The patient is not nervous/anxious.          OBJECTIVE:   BP (!) 144/92 (BP Location: Right arm, Patient Position: Sitting, Cuff Size: Adult Large)   Pulse 94   Temp 97.7  F (36.5  C) (Tympanic)   Resp 16   Ht 1.822 m (5' 11.75\")   Wt 100.7 kg (222 lb)   SpO2 100%   BMI 30.32 kg/m      Physical Exam  GENERAL: healthy, alert and no distress  EYES: Eyes grossly normal to inspection, PERRL and conjunctivae and sclerae normal  HENT: ear canals and TM's normal, nose and mouth without ulcers or lesions  NECK: no adenopathy, no asymmetry, masses, or scars and thyroid normal to palpation  RESP: lungs clear to auscultation - no rales, rhonchi or wheezes  CV: regular rate and rhythm, normal S1 S2, no S3 or S4, no murmur, click or rub, no peripheral edema and peripheral pulses strong  ABDOMEN: soft, nontender, no hepatosplenomegaly, no masses and bowel sounds normal   (male): normal male genitalia without " lesions or urethral discharge, no hernia  MS: no gross musculoskeletal defects noted, no edema  SKIN: no suspicious lesions or rashes  NEURO: Normal strength and tone, mentation intact and speech normal  PSYCH: mentation appears normal, affect normal/bright    Diagnostic Test Results:  Labs reviewed in Epic    ASSESSMENT/PLAN:   (Z00.00) Routine general medical examination at a health care facility  (primary encounter diagnosis)  Comment: Reviewed medical/social/family history and health maintenance  Plan:     (F41.1) DIANNE (generalized anxiety disorder)  Comment: He is interested in medication today and has started therapy this week.  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  He was agreeable to lexapro and we discussed common side effects that can initially occur, which usually resolve in a week or 2.  We will follow up in one month to check on his progress.  Plan: escitalopram (LEXAPRO) 10 MG tablet            (I10) Essential hypertension  Comment: On metoprolol for high HR, but review of BP suggests borderline control.  Agreeable to addition of hydrochlorothiazide and he plans to buy a cuff for home.  Will follow up in 1 month and consider addition of ACE/ARB at that time.  Plan: hydrochlorothiazide (HYDRODIURIL) 12.5 MG         tablet, metoprolol succinate ER (TOPROL XL) 50         MG 24 hr tablet            (K85.20) Alcohol-induced acute pancreatitis without infection or necrosis  Comment: Recently hospitalized.  Has significantly decreased his drinking.  Will recheck labs today, lipase was trending down.  Plan: Comprehensive metabolic panel (BMP + Alb, Alk         Phos, ALT, AST, Total. Bili, TP), Lipase            (Z12.11) Screen for colon cancer  Comment: Colonoscopy done through [facility] last year.  Will abstract.  Plan:     (Z11.4) Screening for HIV (human immunodeficiency virus)  Comment:   Plan: HIV Antigen Antibody Combo            (Z11.59) Need for  "hepatitis C screening test  Comment:   Plan: Hepatitis C Screen Reflex to HCV RNA Quant and         Genotype            (M25.50) Multiple joint pain  Comment: Would like a lyme's test.  [place] and his dog had it.  We discussed other etiologies including autoimmune, inflammatory arthritis, or osteoarthritis.  It is reasonable to rule out lyme's given his outdoor activities.  Plan: Lyme Disease Total Abs Bld with Reflex to         Confirm CLIA            (Z13.220) Lipid screening  Comment:   Plan: Lipid panel reflex to direct LDL Fasting            (Z13.29) Screening for thyroid disorder  Comment:   Plan: TSH with free T4 reflex              Patient has been advised of split billing requirements and indicates understanding: Yes      COUNSELING:   Reviewed preventive health counseling, as reflected in patient instructions    Estimated body mass index is 30.32 kg/m  as calculated from the following:    Height as of this encounter: 1.822 m (5' 11.75\").    Weight as of this encounter: 100.7 kg (222 lb).     Weight management plan: Discussed healthy diet and exercise guidelines    He reports that he has never smoked. He has never used smokeless tobacco.      Counseling Resources:  ATP IV Guidelines  Pooled Cohorts Equation Calculator  FRAX Risk Assessment  ICSI Preventive Guidelines  Dietary Guidelines for Americans, 2010  USDA's MyPlate  ASA Prophylaxis  Lung CA Screening    Claire Wilkins, JULES [facility]  "

## 2022-11-09 LAB
B BURGDOR IGG+IGM SER QL: 0.08
HCV AB SERPL QL IA: NONREACTIVE
HIV 1+2 AB+HIV1 P24 AG SERPL QL IA: NONREACTIVE

## 2023-02-06 ENCOUNTER — TELEPHONE (OUTPATIENT)
Dept: FAMILY MEDICINE | Facility: CLINIC | Age: 48
End: 2023-02-06

## 2023-02-06 NOTE — TELEPHONE ENCOUNTER
Prior Authorization Retail Medication Request    Medication/Dose: zolpidem (AMBIEN) 10 MG tablet  ICD code (if different than what is on RX):  Previously Tried and Failed:  Rationale:    Insurance Name:    Insurance ID: [id number]    Pharmacy Information (if different than what is on RX)  Name:  Phone:    Please include previous medications tried and failed.  Please ask insurance for medications on formulary.

## 2023-02-09 NOTE — TELEPHONE ENCOUNTER
Prior Authorization Approval    Authorization Effective Date: 1/10/2023  Authorization Expiration Date: 2/9/2024  Medication: zolpidem (AMBIEN) 10 MG tablet, rec 2-5-23 PA APPROVED  Approved Dose/Quantity: 30  Reference #:     Insurance Company: [employer] - Phone [phone] Fax [phone]  Expected CoPay:       CoPay Card Available:      Foundation Assistance Needed:    Which Pharmacy is filling the prescription (Not needed for infusion/clinic administered): [facility] #[id number] - [address] AT [address] [place] [address]  Pharmacy Notified: Yes  Patient Notified: Comment:  Pharmacy will notify patient.

## 2023-02-09 NOTE — TELEPHONE ENCOUNTER
PA Initiation    Medication: zolpidem (AMBIEN) 10 MG tablet, rec 2-5-23 PA INITIATED  Insurance Company: [employer] - Phone [phone] Fax [phone]  Pharmacy Filling the Rx: [facility] #[id number] [address] AT [address] [place] [address]  Filling Pharmacy Phone: [phone]  Filling Pharmacy Fax:    Start Date: 2/9/2023    Central Prior Authorization Team   Phone: [phone]

## 2023-03-07 DIAGNOSIS — F51.01 PRIMARY INSOMNIA: ICD-10-CM

## 2023-03-07 RX ORDER — ZOLPIDEM TARTRATE 10 MG/1
TABLET ORAL
Qty: 30 TABLET | OUTPATIENT
Start: 2023-03-07

## 2023-03-28 ENCOUNTER — E-VISIT (OUTPATIENT)
Dept: FAMILY MEDICINE | Facility: CLINIC | Age: 48
End: 2023-03-28
Payer: COMMERCIAL

## 2023-03-28 DIAGNOSIS — Z51.81 ENCOUNTER FOR THERAPEUTIC DRUG MONITORING: Primary | ICD-10-CM

## 2023-03-28 PROCEDURE — 99207 PR NON-BILLABLE SERV PER CHARTING: CPT | Performed by: NURSE PRACTITIONER

## 2023-03-28 NOTE — PATIENT INSTRUCTIONS
Thank you for choosing us for your care. I think an in-clinic visit would be best next steps based on your symptoms. Please schedule a clinic appointment; you won t be charged for this eVisit.      You can schedule an appointment right here in [facility], or call [phone]

## 2023-03-31 ENCOUNTER — VIRTUAL VISIT (OUTPATIENT)
Dept: FAMILY MEDICINE | Facility: CLINIC | Age: 48
End: 2023-03-31
Payer: COMMERCIAL

## 2023-03-31 DIAGNOSIS — F51.04 PSYCHOPHYSIOLOGICAL INSOMNIA: Primary | ICD-10-CM

## 2023-03-31 DIAGNOSIS — F41.1 GAD (GENERALIZED ANXIETY DISORDER): ICD-10-CM

## 2023-03-31 DIAGNOSIS — I10 ESSENTIAL HYPERTENSION: ICD-10-CM

## 2023-03-31 PROCEDURE — 99214 OFFICE O/P EST MOD 30 MIN: CPT | Mod: 93 | Performed by: NURSE PRACTITIONER

## 2023-03-31 PROCEDURE — 96127 BRIEF EMOTIONAL/BEHAV ASSMT: CPT | Mod: 93 | Performed by: NURSE PRACTITIONER

## 2023-03-31 RX ORDER — ESZOPICLONE 1 MG/1
1 TABLET, FILM COATED ORAL
Qty: 30 TABLET | Refills: 0 | Status: SHIPPED | OUTPATIENT
Start: 2023-03-31 | End: 2023-05-11

## 2023-03-31 RX ORDER — SERTRALINE HYDROCHLORIDE 25 MG/1
25 TABLET, FILM COATED ORAL DAILY
Qty: 90 TABLET | Refills: 1 | Status: SHIPPED | OUTPATIENT
Start: 2023-03-31 | End: 2024-04-24

## 2023-03-31 ASSESSMENT — ANXIETY QUESTIONNAIRES
5. BEING SO RESTLESS THAT IT IS HARD TO SIT STILL: SEVERAL DAYS
7. FEELING AFRAID AS IF SOMETHING AWFUL MIGHT HAPPEN: NOT AT ALL
1. FEELING NERVOUS, ANXIOUS, OR ON EDGE: SEVERAL DAYS
GAD7 TOTAL SCORE: 3
GAD7 TOTAL SCORE: 3
8. IF YOU CHECKED OFF ANY PROBLEMS, HOW DIFFICULT HAVE THESE MADE IT FOR YOU TO DO YOUR WORK, TAKE CARE OF THINGS AT HOME, OR GET ALONG WITH OTHER PEOPLE?: NOT DIFFICULT AT ALL
6. BECOMING EASILY ANNOYED OR IRRITABLE: NOT AT ALL
2. NOT BEING ABLE TO STOP OR CONTROL WORRYING: NOT AT ALL
7. FEELING AFRAID AS IF SOMETHING AWFUL MIGHT HAPPEN: NOT AT ALL
3. WORRYING TOO MUCH ABOUT DIFFERENT THINGS: SEVERAL DAYS
GAD7 TOTAL SCORE: 3
IF YOU CHECKED OFF ANY PROBLEMS ON THIS QUESTIONNAIRE, HOW DIFFICULT HAVE THESE PROBLEMS MADE IT FOR YOU TO DO YOUR WORK, TAKE CARE OF THINGS AT HOME, OR GET ALONG WITH OTHER PEOPLE: NOT DIFFICULT AT ALL
4. TROUBLE RELAXING: NOT AT ALL

## 2023-03-31 ASSESSMENT — PATIENT HEALTH QUESTIONNAIRE - PHQ9
10. IF YOU CHECKED OFF ANY PROBLEMS, HOW DIFFICULT HAVE THESE PROBLEMS MADE IT FOR YOU TO DO YOUR WORK, TAKE CARE OF THINGS AT HOME, OR GET ALONG WITH OTHER PEOPLE: NOT DIFFICULT AT ALL
SUM OF ALL RESPONSES TO PHQ QUESTIONS 1-9: 2
SUM OF ALL RESPONSES TO PHQ QUESTIONS 1-9: 2

## 2023-03-31 NOTE — PROGRESS NOTES
Hector is a 47 year old who is being evaluated via a billable telephone visit.      What phone number would you like to be contacted at? [phone]  How would you like to obtain your AVS? Moira    Distant Location (provider location):  On-site    Assessment & Plan     Psychophysiological insomnia  Was using his Ambien up to 3 nights a week.  Currently no issue falling asleep, but wakes up with racing mind around 2 AM even when using Ambien.  We can try Lunesta.  Discussed her goal should be to continue to not need a sleep aid and ultimately manage his anxiety better.  Recommended he move his sertraline to morning and we can increase when we follow-up at her next visit.  Suspect taking at night was contributing to his insomnia.  - eszopiclone (LUNESTA) 1 MG tablet; Take 1 tablet (1 mg) by mouth nightly as needed for sleep    DIANNE (generalized anxiety disorder)  - sertraline (ZOLOFT) 25 MG tablet; Take 1 tablet (25 mg) by mouth daily    Essential hypertension  Not checking at home.  He was agreeable to coming into clinic for blood pressure check and follow-up BMPs and starting his hydrochlorothiazide.  Appears he has plenty of refills of metoprolol available.  We may want to consider addition of an ACE or an ARB.  - Basic metabolic panel  (Ca, Cl, CO2, Creat, Gluc, K, Na, BUN); Future    Ordering of each unique test  Prescription drug management             JULES [facility]   Hector is a 47 year old, presenting for the following health issues:  Recheck Medication (Med Check )    Additional Questions 3/31/2023   Roomed by Kandis Gusman     History of Present Illness       Mental Health Follow-up:  Patient presents to follow-up on Depression & Anxiety.Patient's depression since last visit has been:  Better  The patient is not having other symptoms associated with depression.  Patient's anxiety since last visit has been:  Better  The patient is not having  other symptoms associated with anxiety.  Any significant life events: job concerns and financial concerns  Patient is not feeling anxious or having panic attacks.  Patient has no concerns about alcohol or drug use.    Hypertension: He presents for follow up of hypertension.  He does not check blood pressure  regularly outside of the clinic. Outside blood pressures have been over 140/90. He follows a low salt diet.     Reason for visit:  Med check-upHe consumes 1 sweetened beverage(s) daily.He exercises with enough effort to increase his heart rate 30 to 60 minutes per day.  He exercises with enough effort to increase his heart rate 6 days per week. He is missing 1 dose(s) of medications per week.  He is not taking prescribed medications regularly due to remembering to take.    Today's PHQ-9         PHQ-9 Total Score: 2    PHQ-9 Q9 Thoughts of better off dead/self-harm past 2 weeks :   Not at all    How difficult have these problems made it for you to do your work, take care of things at home, or get along with other people: Not difficult at all  Today's DIANNE-7 Score: 3       Needing metoprolol?     Went to [place], [place], [place].  Didn't feel anxious.  Feels that a lot of his symptoms are seasonal.     Ambien, using up to 3 times a week.  Not having difficulty falling asleep,  Wakes up, mind starts racing.      Zoloft, felt like sleep affected, was still awake.  Taking at night.            Review of Systems   Constitutional, HEENT, cardiovascular, pulmonary, gi and gu systems are negative, except as otherwise noted.      Objective           Vitals:  No vitals were obtained today due to virtual visit.    Physical Exam   healthy, alert and no distress  PSYCH: Alert and oriented times 3; coherent speech, normal   rate and volume, able to articulate logical thoughts, able   to abstract reason, no tangential thoughts, no hallucinations   or delusions  His affect is normal  RESP: No cough, no audible wheezing, able to talk  in full sentences  Remainder of exam unable to be completed due to telephone visits                Phone call duration: 15 minutes

## 2023-05-09 ENCOUNTER — MYC MEDICAL ADVICE (OUTPATIENT)
Dept: FAMILY MEDICINE | Facility: CLINIC | Age: 48
End: 2023-05-09
Payer: COMMERCIAL

## 2023-05-09 DIAGNOSIS — F51.01 PRIMARY INSOMNIA: ICD-10-CM

## 2023-05-09 DIAGNOSIS — F51.01 PRIMARY INSOMNIA: Primary | ICD-10-CM

## 2023-05-11 RX ORDER — ZOLPIDEM TARTRATE 10 MG/1
10 TABLET ORAL
Qty: 30 TABLET | Refills: 1 | Status: SHIPPED | OUTPATIENT
Start: 2023-05-11 | End: 2023-09-01

## 2023-05-11 NOTE — TELEPHONE ENCOUNTER
Multiple messages, see more recent encounter.    KATHYA BrionesN, RN  [facility]

## 2023-05-11 NOTE — TELEPHONE ENCOUNTER
"Claire - see pt [employer] message r/t Lunesta    \"I would like to request a refill of Ambien vs the Lunesta that I have taken for over a month.  It's seems to make me groggy the next day and lingers longer than Ambien so I'd prefer the shorter vs longer acting version of the medication.\"    Writer replied to patient via [employer]. Routed to provider for input.    May require visit to discuss medication changes. Was seen on 3/31/23 for insomnia and switched to Lunesta at that time.    KATHYA BrionesN, RN  [facility]    "

## 2023-05-24 NOTE — CONSULTS
"Spoke with patient via bedside phone.  Patient stated that he completed OP tx via Zoom at [facility] 2 years ago and stayed sober for \"a short while\" afterward.  Patient is interested in attending in person CD tx at this time.  Resources were emailed to the patient, per his request.  Patient is interested in completing an OP assessment for access to services.    PLACE DISCHARGE  ORDER 9035 at the time of discharge which will go into a work queue for [facility] to call and coordinate a ANTIONE assessment appointment as an outpatient appointment.    Patient can call [facility] at [phone] for a virtual Outpatient ANTIONE Assessment.    Zeinab Rubin MA, [facility]  Phone: [phone]  Email: [email or web address]        " Cyclosporine Counseling:  I discussed with the patient the risks of cyclosporine including but not limited to hypertension, gingival hyperplasia,myelosuppression, immunosuppression, liver damage, kidney damage, neurotoxicity, lymphoma, and serious infections. The patient understands that monitoring is required including baseline blood pressure, CBC, CMP, lipid panel and uric acid, and then 1-2 times monthly CMP and blood pressure.

## 2023-06-12 ENCOUNTER — DOCUMENTATION ONLY (OUTPATIENT)
Dept: LAB | Facility: CLINIC | Age: 48
End: 2023-06-12
Payer: COMMERCIAL

## 2023-06-12 NOTE — PROGRESS NOTES
Hector Ramirez has an upcoming lab appointment.        Future Appointments   Date Time Provider Department Center   6/23/2023  1:30 PM [facility]       The appointment note says: labs for Clairedewey Wilkins     There is a BMP order from March, is this the order we are using?          Thanks,    Anne Shen

## 2023-09-01 DIAGNOSIS — F51.01 PRIMARY INSOMNIA: ICD-10-CM

## 2023-09-01 RX ORDER — ZOLPIDEM TARTRATE 10 MG/1
10 TABLET ORAL
Qty: 30 TABLET | Refills: 0 | Status: SHIPPED | OUTPATIENT
Start: 2023-09-01 | End: 2023-10-04

## 2023-09-01 NOTE — TELEPHONE ENCOUNTER
Medication Question or Refill        What medication are you calling about (include dose and sig)?:   zolpidem (AMBIEN) 10 MG tablet   Pt says bottle of medication says there is a refill left but [facility] is stating they need provider approval.    Preferred Pharmacy:   [facility] #[id number] - [address] AT [address] [address]  Phone: [phone] Fax: [phone]      Controlled Substance Agreement on file:   CSA -- Patient Level:    CSA: None found at the patient level.       Who prescribed the medication?: Claire Wilkins, JULES CNP     Do you need a refill? Yes    When did you use the medication last? Wednesday, not much left    Patient offered an appointment? No    Do you have any questions or concerns?  No      Could we send this information to you in [employer] or would you prefer to receive a phone call?:   Patient would like to be contacted via [employer]

## 2023-09-05 RX ORDER — ZOLPIDEM TARTRATE 10 MG/1
10 TABLET ORAL
Qty: 30 TABLET | Refills: 0 | OUTPATIENT
Start: 2023-09-05

## 2023-09-05 NOTE — TELEPHONE ENCOUNTER
Per patient, [facility] on [address] was broken into and closed. Does not have date they will reopen. Asking med be resent to pended pharm-

## 2023-10-04 DIAGNOSIS — F51.01 PRIMARY INSOMNIA: ICD-10-CM

## 2023-10-05 RX ORDER — ZOLPIDEM TARTRATE 10 MG/1
10 TABLET ORAL
Qty: 30 TABLET | Refills: 0 | Status: SHIPPED | OUTPATIENT
Start: 2023-10-05 | End: 2024-06-07

## 2023-10-09 ENCOUNTER — PATIENT OUTREACH (OUTPATIENT)
Dept: CARE COORDINATION | Facility: CLINIC | Age: 48
End: 2023-10-09
Payer: COMMERCIAL

## 2023-10-23 ENCOUNTER — PATIENT OUTREACH (OUTPATIENT)
Dept: CARE COORDINATION | Facility: CLINIC | Age: 48
End: 2023-10-23
Payer: COMMERCIAL

## 2023-12-11 DIAGNOSIS — F51.01 PRIMARY INSOMNIA: ICD-10-CM

## 2023-12-11 DIAGNOSIS — I10 ESSENTIAL HYPERTENSION: ICD-10-CM

## 2023-12-26 RX ORDER — ZOLPIDEM TARTRATE 10 MG/1
10 TABLET ORAL
Qty: 30 TABLET | Refills: 0 | Status: SHIPPED | OUTPATIENT
Start: 2023-12-26 | End: 2024-06-07

## 2023-12-26 NOTE — TELEPHONE ENCOUNTER
Claire/Covering Providers-Please review and advise.  Patient requesting refill of Ambien be sent to a pharmacy in [place], where patient is living temporarily.    See 12/11/23 refill encounter which addresses Metoprolol refill request.    Last visit: 3/31/23 virtual visit with BRANDON Wilkins CNP.    Thank you!  CHIDI العلي, RN-BC  [facility]

## 2023-12-27 RX ORDER — METOPROLOL SUCCINATE 50 MG/1
50 TABLET, EXTENDED RELEASE ORAL AT BEDTIME
Qty: 90 TABLET | Refills: 0 | Status: SHIPPED | OUTPATIENT
Start: 2023-12-27 | End: 2024-03-15

## 2023-12-29 NOTE — TELEPHONE ENCOUNTER
I cannot sign something I have never seen or managed.  Will defer to PCP to address on return.  Is he living in [place] or just visiting?

## 2023-12-29 NOTE — TELEPHONE ENCOUNTER
PT called about his ambien rx.  I noted the 12/26/23 rx was sent to the [facility] pharmacy in [facility].  Pt will check with his pharmacy.      NADEGE Villa

## 2023-12-29 NOTE — TELEPHONE ENCOUNTER
Pt called stated that due to [place] law a controlled substance has to be signed and filled by a MD or DO. Can not be sent by a PA or NP. Pt is requesting this be sent by MD or DO so he can get this filled as they will not fill it otherwise. Routing to the DOD.

## 2024-01-02 RX ORDER — ZOLPIDEM TARTRATE 10 MG/1
10 TABLET ORAL
Qty: 30 TABLET | Refills: 0 | OUTPATIENT
Start: 2024-01-02

## 2024-01-02 NOTE — TELEPHONE ENCOUNTER
Controlled substance cannot be sent out of state. He will need to address when he is back.  He is also overdue for a preventative visit and CSA.

## 2024-01-02 NOTE — TELEPHONE ENCOUNTER
Pt called stated that he is out and has been out since the weekend. Pt stated that due to [place] law a controlled substance has to be signed and filled by a MD or DO. Can not be sent by a PA or NP. Pt is requesting this be sent by MD or DO so he can get this filled as they will not fill it otherwise. Routing to the DOD.

## 2024-01-02 NOTE — TELEPHONE ENCOUNTER
Per patient he is not living down in [place]. He is visiting his parents to help them and said he tried to get an appt prior to his departure, but we did not have access. Plans to be down for about a month, maybe a little longer. Writer asked if patient planned to be in [place] prior to Spring and stated yes. Was encouraged to book an appt asap as the calendar is growing and said he would schedule.

## 2024-01-23 ENCOUNTER — MYC MEDICAL ADVICE (OUTPATIENT)
Dept: FAMILY MEDICINE | Facility: CLINIC | Age: 49
End: 2024-01-23
Payer: COMMERCIAL

## 2024-01-23 DIAGNOSIS — F51.01 PRIMARY INSOMNIA: ICD-10-CM

## 2024-01-26 ENCOUNTER — TELEPHONE (OUTPATIENT)
Dept: FAMILY MEDICINE | Facility: CLINIC | Age: 49
End: 2024-01-26
Payer: COMMERCIAL

## 2024-01-26 RX ORDER — ZOLPIDEM TARTRATE 10 MG/1
10 TABLET ORAL
Qty: 30 TABLET | Refills: 0 | OUTPATIENT
Start: 2024-01-26

## 2024-01-26 NOTE — TELEPHONE ENCOUNTER
Patient has not had an in-person visit since 2022.  We are required to have at least one a year for any controlled substance.  I advise he make an appointment with a physician down in [place] for refills and we can discuss ongoing management when he returns to [place].

## 2024-01-26 NOTE — TELEPHONE ENCOUNTER
Claire/DOD: Pended Ambien, which needs to be signed by an MD as pt is in [place].    I have scheduled a wellness exam for after I return to [place] on March 26th. I'm checking to see if I'm able to get a refill for Ambien via MD script in [place]? My medical plan is out of network here unless I visit Urgent or Emergency care.   ---  As I'm in [place] they require an MD prescription vs PA for Ambien.  It's based on it being a controlled substance and cross state borders.      The [facility] to [facility]:  [address] , [place] [address]      Let me know if you have any other questions.     Shaunna CURRIE RN  [facility]

## 2024-01-28 ENCOUNTER — HEALTH MAINTENANCE LETTER (OUTPATIENT)
Age: 49
End: 2024-01-28

## 2024-02-07 NOTE — TELEPHONE ENCOUNTER
Prior Authorization Not Needed per Insurance    Medication: Zolpidem 10 mg  is covered under patients formulary plan.        Please close encounter when finished.    Thank you,  Central Prior Authorization Team  [phone]

## 2024-03-15 DIAGNOSIS — I10 ESSENTIAL HYPERTENSION: ICD-10-CM

## 2024-03-15 RX ORDER — METOPROLOL SUCCINATE 50 MG/1
50 TABLET, EXTENDED RELEASE ORAL AT BEDTIME
Qty: 90 TABLET | Refills: 0 | Status: SHIPPED | OUTPATIENT
Start: 2024-03-15 | End: 2024-06-14

## 2024-04-24 ENCOUNTER — TELEPHONE (OUTPATIENT)
Dept: FAMILY MEDICINE | Facility: CLINIC | Age: 49
End: 2024-04-24
Payer: COMMERCIAL

## 2024-04-24 DIAGNOSIS — F41.1 GAD (GENERALIZED ANXIETY DISORDER): ICD-10-CM

## 2024-04-24 RX ORDER — SERTRALINE HYDROCHLORIDE 25 MG/1
25 TABLET, FILM COATED ORAL DAILY
Qty: 90 TABLET | Refills: 0 | Status: SHIPPED | OUTPATIENT
Start: 2024-04-24 | End: 2024-06-07

## 2024-04-24 NOTE — TELEPHONE ENCOUNTER
Claire, patient cancelled March visit. Is now scheduled for June. Is still in [place]. Please advise-thanks!

## 2024-04-24 NOTE — TELEPHONE ENCOUNTER
Medication Question or Refill    Contacts         Type Contact Phone/Fax    04/24/2024 01:22 PM CDT Phone (Incoming) Hector Ramirez (Self) [phone] (H)            What medication are you calling about (include dose and sig)?:   sertraline (ZOLOFT) 25 MG tablet      Preferred Pharmacy:   [id number] - [place]  [address] [place] [address] [place] [address]  PHONE: [phone]        Controlled Substance Agreement on file:   CSA -- Patient Level:    CSA: None found at the patient level.       Who prescribed the medication?: Claire Wilkins    Do you need a refill? Yes    When did you use the medication last? 4/24/24    Patient offered an appointment? No    Do you have any questions or concerns?  Yes: Yes more refills and only has one more dose left of medication       Could we send this information to you in [facility] or would you prefer to receive a phone call?:   Patient would prefer a phone call   Okay to leave a detailed message?: Yes at Cell number on file:    Telephone Information:   Mobile [phone]

## 2024-05-06 ENCOUNTER — PATIENT OUTREACH (OUTPATIENT)
Dept: CARE COORDINATION | Facility: CLINIC | Age: 49
End: 2024-05-06
Payer: COMMERCIAL

## 2024-06-06 SDOH — HEALTH STABILITY: PHYSICAL HEALTH: ON AVERAGE, HOW MANY MINUTES DO YOU ENGAGE IN EXERCISE AT THIS LEVEL?: 40 MIN

## 2024-06-06 SDOH — HEALTH STABILITY: PHYSICAL HEALTH: ON AVERAGE, HOW MANY DAYS PER WEEK DO YOU ENGAGE IN MODERATE TO STRENUOUS EXERCISE (LIKE A BRISK WALK)?: 7 DAYS

## 2024-06-06 ASSESSMENT — SOCIAL DETERMINANTS OF HEALTH (SDOH): HOW OFTEN DO YOU GET TOGETHER WITH FRIENDS OR RELATIVES?: TWICE A WEEK

## 2024-06-07 ENCOUNTER — OFFICE VISIT (OUTPATIENT)
Dept: FAMILY MEDICINE | Facility: CLINIC | Age: 49
End: 2024-06-07
Payer: COMMERCIAL

## 2024-06-07 VITALS
BODY MASS INDEX: 30.83 KG/M2 | OXYGEN SATURATION: 100 % | SYSTOLIC BLOOD PRESSURE: 127 MMHG | TEMPERATURE: 98.2 F | HEART RATE: 83 BPM | HEIGHT: 72 IN | WEIGHT: 227.6 LBS | DIASTOLIC BLOOD PRESSURE: 89 MMHG | RESPIRATION RATE: 20 BRPM

## 2024-06-07 DIAGNOSIS — Z00.00 ROUTINE GENERAL MEDICAL EXAMINATION AT A HEALTH CARE FACILITY: Primary | ICD-10-CM

## 2024-06-07 DIAGNOSIS — R23.2 FLUSHING REACTION: ICD-10-CM

## 2024-06-07 DIAGNOSIS — Z87.19 HISTORY OF PANCREATITIS: ICD-10-CM

## 2024-06-07 DIAGNOSIS — E78.00 PURE HYPERCHOLESTEROLEMIA: ICD-10-CM

## 2024-06-07 DIAGNOSIS — I10 ESSENTIAL HYPERTENSION: ICD-10-CM

## 2024-06-07 DIAGNOSIS — G47.00 PERSISTENT INSOMNIA: ICD-10-CM

## 2024-06-07 DIAGNOSIS — Z12.5 SCREENING FOR PROSTATE CANCER: ICD-10-CM

## 2024-06-07 DIAGNOSIS — F41.1 GAD (GENERALIZED ANXIETY DISORDER): ICD-10-CM

## 2024-06-07 DIAGNOSIS — Z11.3 SCREENING EXAMINATION FOR STI: ICD-10-CM

## 2024-06-07 DIAGNOSIS — Z13.1 SCREENING FOR DIABETES MELLITUS: ICD-10-CM

## 2024-06-07 LAB — HBA1C MFR BLD: 5 % (ref 0–5.6)

## 2024-06-07 PROCEDURE — 87389 HIV-1 AG W/HIV-1&-2 AB AG IA: CPT | Mod: 59 | Performed by: NURSE PRACTITIONER

## 2024-06-07 PROCEDURE — 82570 ASSAY OF URINE CREATININE: CPT | Performed by: NURSE PRACTITIONER

## 2024-06-07 PROCEDURE — 87491 CHLMYD TRACH DNA AMP PROBE: CPT | Performed by: NURSE PRACTITIONER

## 2024-06-07 PROCEDURE — 84443 ASSAY THYROID STIM HORMONE: CPT | Performed by: NURSE PRACTITIONER

## 2024-06-07 PROCEDURE — 86701 HIV-1ANTIBODY: CPT | Performed by: NURSE PRACTITIONER

## 2024-06-07 PROCEDURE — 83036 HEMOGLOBIN GLYCOSYLATED A1C: CPT | Performed by: NURSE PRACTITIONER

## 2024-06-07 PROCEDURE — G0103 PSA SCREENING: HCPCS | Performed by: NURSE PRACTITIONER

## 2024-06-07 PROCEDURE — 87591 N.GONORRHOEAE DNA AMP PROB: CPT | Performed by: NURSE PRACTITIONER

## 2024-06-07 PROCEDURE — 80053 COMPREHEN METABOLIC PANEL: CPT | Performed by: NURSE PRACTITIONER

## 2024-06-07 PROCEDURE — 86780 TREPONEMA PALLIDUM: CPT | Performed by: NURSE PRACTITIONER

## 2024-06-07 PROCEDURE — 80061 LIPID PANEL: CPT | Performed by: NURSE PRACTITIONER

## 2024-06-07 PROCEDURE — 99214 OFFICE O/P EST MOD 30 MIN: CPT | Mod: 25 | Performed by: NURSE PRACTITIONER

## 2024-06-07 PROCEDURE — 86702 HIV-2 ANTIBODY: CPT | Performed by: NURSE PRACTITIONER

## 2024-06-07 PROCEDURE — 36415 COLL VENOUS BLD VENIPUNCTURE: CPT | Performed by: NURSE PRACTITIONER

## 2024-06-07 PROCEDURE — 83690 ASSAY OF LIPASE: CPT | Performed by: NURSE PRACTITIONER

## 2024-06-07 PROCEDURE — 82043 UR ALBUMIN QUANTITATIVE: CPT | Performed by: NURSE PRACTITIONER

## 2024-06-07 PROCEDURE — 99396 PREV VISIT EST AGE 40-64: CPT | Performed by: NURSE PRACTITIONER

## 2024-06-07 RX ORDER — LOSARTAN POTASSIUM 50 MG/1
50 TABLET ORAL DAILY
Qty: 90 TABLET | Refills: 0 | Status: SHIPPED | OUTPATIENT
Start: 2024-06-07 | End: 2024-10-07

## 2024-06-07 RX ORDER — NORTRIPTYLINE HCL 10 MG
10 CAPSULE ORAL AT BEDTIME
Qty: 90 CAPSULE | Refills: 0 | Status: SHIPPED | OUTPATIENT
Start: 2024-06-07 | End: 2024-10-07

## 2024-06-07 RX ORDER — HYDROCHLOROTHIAZIDE 12.5 MG/1
12.5 TABLET ORAL DAILY
Qty: 90 TABLET | Refills: 2 | Status: SHIPPED | OUTPATIENT
Start: 2024-06-07 | End: 2024-10-07

## 2024-06-07 ASSESSMENT — PAIN SCALES - GENERAL: PAINLEVEL: NO PAIN (0)

## 2024-06-07 NOTE — PATIENT INSTRUCTIONS
"Preventive Care Advice   This is general advice we often give to help people stay healthy. Your care team may have specific advice just for you. Please talk to your care team about your own preventive care needs.  Lifestyle  Exercise at least 150 minutes each week (30 minutes a day, 5 days a week).  Do muscle strengthening activities 2 days a week. These help control your weight and prevent disease.  No smoking.  Wear sunscreen to prevent skin cancer.  Have your home tested for radon every 2 to 5 years. Radon is a colorless, odorless gas that can harm your lungs. To learn more, go to www.health.[facility].mn. and search for \"Radon in Homes.\"  Keep guns unloaded and locked up in a safe place like a safe or gun vault, or, use a gun lock and hide the keys. Always lock away bullets separately. To learn more, visit [employer].mn.gov and search for \"safe gun storage.\"  Nutrition  Eat 5 or more servings of fruits and vegetables each day.  Try wheat bread, brown rice and whole grain pasta (instead of white bread, rice, and pasta).  Get enough calcium and vitamin D. Check the label on foods and aim for 100% of the RDA (recommended daily allowance).  Regular exams  Have a dental exam and cleaning every 6 months.  See your health care team every year to talk about:  Any changes in your health.  Any medicines your care team has prescribed.  Preventive care, family planning, and ways to prevent chronic diseases.  Shots (vaccines)   HPV shots (up to age 26), if you've never had them before.  Hepatitis B shots (up to age 59), if you've never had them before.  COVID-19 shot: Get this shot when it's due.  Flu shot: Get a flu shot every year.  Tetanus shot: Get a tetanus shot every 10 years.  Pneumococcal, hepatitis A, and RSV shots: Ask your care team if you need these based on your risk.  Shingles shot (for age 50 and up).  General health tests  Diabetes screening:  Starting at age 35, Get screened for diabetes at least every 3 years.  If " you are younger than age 35, ask your care team if you should be screened for diabetes.  Cholesterol test: At age 39, start having a cholesterol test every 5 years, or more often if advised.  Bone density scan (DEXA): At age 50, ask your care team if you should have this scan for osteoporosis (brittle bones).  Hepatitis C: Get tested at least once in your life.  Abdominal aortic aneurysm screening: Talk to your doctor about having this screening if you:  Have ever smoked; and  Are biologically male; and  Are between the ages of 65 and 75.  STIs (sexually transmitted infections)  Before age 24: Ask your care team if you should be screened for STIs.  After age 24: Get screened for STIs if you're at risk. You are at risk for STIs (including HIV) if:  You are sexually active with more than one person.  You don't use condoms every time.  You or a partner was diagnosed with a sexually transmitted infection.  If you are at risk for HIV, ask about PrEP medicine to prevent HIV.  Get tested for HIV at least once in your life, whether you are at risk for HIV or not.  Cancer screening tests  Cervical cancer screening: If you have a cervix, begin getting regular cervical cancer screening tests at age 21. Most people who have regular screenings with normal results can stop after age 65. Talk about this with your provider.  Breast cancer scan (mammogram): If you've ever had breasts, begin having regular mammograms starting at age 40. This is a scan to check for breast cancer.  Colon cancer screening: It is important to start screening for colon cancer at age 45.  Have a colonoscopy test every 10 years (or more often if you're at risk) Or, ask your provider about stool tests like a FIT test every year or Cologuard test every 3 years.  To learn more about your testing options, visit: www.[employer]/372472.pdf.  For help making a decision, visit: mickey/gv88407.  Prostate cancer screening test: If you have a prostate and are age 55  to 69, ask your provider if you would benefit from a yearly prostate cancer screening test.  Lung cancer screening: If you are a current or former smoker age 50 to 80, ask your care team if ongoing lung cancer screenings are right for you.  For informational purposes only. Not to replace the advice of your health care provider. Copyright   2023 [facility]. All rights reserved. Clinically reviewed by the [facility] Transitions Program. [facility] [id number] - REV 04/24.    Learning About Stress  What is stress?     Stress is your body's response to a hard situation. Your body can have a physical, emotional, or mental response. Stress is a fact of life for most people, and it affects everyone differently. What causes stress for you may not be stressful for someone else.  A lot of things can cause stress. You may feel stress when you go on a job interview, take a test, or run a race. This kind of short-term stress is normal and even useful. It can help you if you need to work hard or react quickly. For example, stress can help you finish an important job on time.  Long-term stress is caused by ongoing stressful situations or events. Examples of long-term stress include long-term health problems, ongoing problems at work, or conflicts in your family. Long-term stress can harm your health.  How does stress affect your health?  When you are stressed, your body responds as though you are in danger. It makes hormones that speed up your heart, make you breathe faster, and give you a burst of energy. This is called the fight-or-flight stress response. If the stress is over quickly, your body goes back to normal and no harm is done.  But if stress happens too often or lasts too long, it can have bad effects. Long-term stress can make you more likely to get sick, and it can make symptoms of some diseases worse. If you tense up when you are stressed, you may develop neck, shoulder, or low back pain. Stress is  linked to high blood pressure and heart disease.  Stress also harms your emotional health. It can make you [place], tense, or depressed. Your relationships may suffer, and you may not do well at work or school.  What can you do to manage stress?  You can try these things to help manage stress:   Do something active. Exercise or activity can help reduce stress. Walking is a great way to get started. Even everyday activities such as housecleaning or yard work can help.  Try yoga or lissette chi. These techniques combine exercise and meditation. You may need some training at first to learn them.  Do something you enjoy. For example, listen to music or go to a movie. Practice your hobby or do volunteer work.  Meditate. This can help you relax, because you are not worrying about what happened before or what may happen in the future.  Do guided imagery. Imagine yourself in any setting that helps you feel calm. You can use online videos, books, or a teacher to guide you.  Do breathing exercises. For example:  From a standing position, bend forward from the waist with your knees slightly bent. Let your arms dangle close to the floor.  Breathe in slowly and deeply as you return to a standing position. Roll up slowly and lift your head last.  Hold your breath for just a few seconds in the standing position.  Breathe out slowly and bend forward from the waist.  Let your feelings out. Talk, laugh, cry, and express anger when you need to. Talking with supportive friends or family, a counselor, or a mack leader about your feelings is a healthy way to relieve stress. Avoid discussing your feelings with people who make you feel worse.  Write. It may help to write about things that are bothering you. This helps you find out how much stress you feel and what is causing it. When you know this, you can find better ways to cope.  What can you do to prevent stress?  You might try some of these things to help prevent stress:  Manage your time.  "This helps you find time to do the things you want and need to do.  Get enough sleep. Your body recovers from the stresses of the day while you are sleeping.  Get support. Your family, friends, and community can make a difference in how you experience stress.  Limit your news feed. Avoid or limit time on social media or news that may make you feel stressed.  Do something active. Exercise or activity can help reduce stress. Walking is a great way to get started.  Where can you learn more?  Go to https://www.[employer]net/patiented  Enter N032 in the search box to learn more about \"Learning About Stress.\"  Current as of: October 24, 2023               Content Version: 14.0    6453-5928 [employer].   Care instructions adapted under license by your healthcare professional. If you have questions about a medical condition or this instruction, always ask your healthcare professional. [employer] disclaims any warranty or liability for your use of this information.      Safer Sex: Care Instructions  Overview  Safer sex is a way to reduce your risk of getting a sexually transmitted infection (STI). It can also help prevent pregnancy.  Several products can help you practice safer sex and reduce your chance of STIs. One of the best is a condom. There are internal and external condoms. You can use a special rubber sheet (dental dam) for protection during oral sex. Disposable gloves can keep your hands from touching blood, semen, or other body fluids that can carry infections.  Remember that birth control methods such as diaphragms, IUDs, foams, and birth control pills do not stop you from getting STIs.  Follow-up care is a key part of your treatment and safety. Be sure to make and go to all appointments, and call your doctor if you are having problems. It's also a good idea to know your test results and keep a list of the medicines you take.  How can you care for yourself at home?  Think about " "getting vaccinated to help prevent hepatitis A, hepatitis B, and human papillomavirus (HPV). They can be spread through sex.  Use a condom every time you have sex. Use an external condom, which goes on the penis. Or use an internal condom, which goes into the vagina or anus.  Make sure you use the right size external condom. A condom that's too small can break easily. A condom that's too big can slip off during sex.  Use a new condom each time you have sex. Be careful not to poke a hole in the condom when you open the wrapper.  Don't use an internal condom and an external condom at the same time.  Never use petroleum jelly (such as Vaseline), grease, hand lotion, baby oil, or anything with oil in it. These products can make holes in the condom.  After intercourse, hold the edge of the condom as you remove it. This will help keep semen from spilling out of the condom.  Do not have sex with anyone who has symptoms of an STI, such as sores on the genitals or mouth.  Do not drink a lot of alcohol or use drugs before sex.  Limit your sex partners. Sex with one partner who has sex only with you can reduce your risk of getting an STI.  Don't share sex toys. But if you do share them, use a condom and clean the sex toys between each use.  Talk to any partners before you have sex. Talk about what you feel comfortable with and whether you have any boundaries with sex. And find out if your partner or partners may be at risk for any STI. Keep in mind that a person may be able to spread an STI even if they do not have symptoms. You and any partners may want to get tested for STIs.  Where can you learn more?  Go to https://www.healthAMT.net/patiented  Enter B608 in the search box to learn more about \"Safer Sex: Care Instructions.\"  Current as of: November 27, 2023               Content Version: 14.0    7018-0132 [employer].   Care instructions adapted under license by your healthcare professional. If you have " questions about a medical condition or this instruction, always ask your healthcare professional. [employer] disclaims any warranty or liability for your use of this information.

## 2024-06-07 NOTE — PROGRESS NOTES
Preventive Care Visit  [facility]  Claire Wilkins, BLANQUITA, Nurse Practitioner Primary Care  Jun 7, 2024      Assessment & Plan     Routine general medical examination at a health care facility  Reviewed medical/social/family history and health maintenance   Will complete Shingrix through the pharmacy    Essential hypertension  Uncontrolled, has been out of medication.  Starting losartan today, continue hydrochlorothiazide. Taking metoprolol for tachycardia.  Follow up in 1 month and we will adjust dosing as needed.   - Comprehensive metabolic panel (BMP + Alb, Alk Phos, ALT, AST, Total. Bili, TP); Future  - hydroCHLOROthiazide 12.5 MG tablet; Take 1 tablet (12.5 mg) by mouth daily  - losartan (COZAAR) 50 MG tablet; Take 1 tablet (50 mg) by mouth daily  - Albumin Random Urine Quantitative with Creat Ratio; Future    Persistent insomnia  Has been taking Ambien, but out for 6 months, not a medication that I typically use in my practice.  Discussed revisiting trazodone or could try nortriptyline.  Trazodone previously worked for him but he reports effect eventually wore off.  Suspect he could have been on a subtherapeutic dose.  We are also increasing his sertraline today due to continued anxiety.  Hopeful once we manage his anxiety disorder that his sleep will also improve.  He would like to try nortriptyline and we will follow-up in a month to assess.  - nortriptyline (PAMELOR) 10 MG capsule; Take 1 capsule (10 mg) by mouth at bedtime    DIANNE (generalized anxiety disorder)  Increasing sertraline as per above, follow-up in 1 month we will continue to adjust dose as needed.  - sertraline (ZOLOFT) 50 MG tablet; Take 1 tablet (50 mg) by mouth daily    Pure hypercholesterolemia  Will continue to assess need for lipid.  - Lipid panel reflex to direct LDL Fasting; Future    Flushing reaction  Having increase flushing and hot flashes.  Will rule out thyroid disorder.  - TSH with free T4 reflex;  "Future    History of pancreatitis  No new symptoms but will check lipase for monitoring purposes.  - Lipase; Future    Screening examination for STI  - HIV Antigen Antibody Combo Cascade; Future  - Treponema Abs w Reflex to RPR and Titer; Future  - Neisseria gonorrhoeae PCR; Future  - Chlamydia trachomatis PCR; Future    Screening for prostate cancer  - PSA, screen; Future    Screening for diabetes mellitus  - Hemoglobin A1c; Future    Patient has been advised of split billing requirements and indicates understanding: Yes        BMI  Estimated body mass index is 30.69 kg/m  as calculated from the following:    Height as of this encounter: 1.834 m (6' 0.21\").    Weight as of this encounter: 103.2 kg (227 lb 9.6 oz).   Weight management plan: Discussed healthy diet and exercise guidelines    Counseling  Appropriate preventive services were discussed with this patient, including applicable screening as appropriate for fall prevention, nutrition, physical activity, Tobacco-use cessation, weight loss and cognition.  Checklist reviewing preventive services available has been given to the patient.  Reviewed patient's diet, addressing concerns and/or questions.           Jose Young is a 49 year old, presenting for the following:  Physical        6/7/2024     1:57 PM   Additional Questions   Roomed by Rona FIGUEREDO        Health Care Directive  Patient does not have a Health Care Directive or Living Will: Discussed advance care planning with patient; however, patient declined at this time.    HPI        6/6/2024   General Health   How would you rate your overall physical health? Good   Feel stress (tense, anxious, or unable to sleep) Rather much   (!) STRESS CONCERN      6/6/2024   Nutrition   Three or more servings of calcium each day? Yes   Diet: Regular (no restrictions)   How many servings of fruit and vegetables per day? (!) 2-3   How many sweetened beverages each day? (!) 3         6/6/2024   Exercise   Days per " week of moderate/strenous exercise 7 days   Average minutes spent exercising at this level 40 min         6/6/2024   Social Factors   Frequency of gathering with friends or relatives Twice a week   Worry food won't last until get money to buy more No   Food not last or not have enough money for food? No   Do you have housing?  Yes   Are you worried about losing your housing? No   Lack of transportation? No   Unable to get utilities (heat,electricity)? No         6/6/2024   Fall Risk   Fallen 2 or more times in the past year? No   Trouble with walking or balance? No          6/6/2024   Dental   Dentist two times every year? Yes         6/6/2024   TB Screening   Were you born outside of the [place]? No         Today's PHQ-2 Score:       6/6/2024     7:48 PM   PHQ-2 ( 1999 [employer])   Q1: Little interest or pleasure in doing things 1   Q2: Feeling down, depressed or hopeless 1   PHQ-2 Score 2   Q1: Little interest or pleasure in doing things Several days   Q2: Feeling down, depressed or hopeless Several days   PHQ-2 Score 2           6/6/2024   Substance Use   Alcohol more than 3/day or more than 7/wk No   Do you use any other substances recreationally? No     Social History     Tobacco Use    Smoking status: Never    Smokeless tobacco: Never   Vaping Use    Vaping status: Never Used   Substance Use Topics    Alcohol use: Yes    Drug use: Never           6/6/2024   STI Screening   New sexual partner(s) since last STI/HIV test? (!) YES    ASCVD Risk   The 10-year ASCVD risk score (Willie CUEVAS, et al., 2019) is: 3.7%    Values used to calculate the score:      Age: 49 years      Sex: Male      Is Non- : No      Diabetic: No      Tobacco smoker: No      Systolic Blood Pressure: 127 mmHg      Is BP treated: Yes      HDL Cholesterol: 75 mg/dL      Total Cholesterol: 256 mg/dL            6/6/2024   Contraception/Family Planning   Questions about contraception or family planning No        Reviewed and  "updated as needed this visit by Provider                             Objective    Exam  /89   Pulse 83   Temp 98.2  F (36.8  C) (Temporal)   Resp 20   Ht 1.834 m (6' 0.21\")   Wt 103.2 kg (227 lb 9.6 oz)   SpO2 100%   BMI 30.69 kg/m     Estimated body mass index is 30.69 kg/m  as calculated from the following:    Height as of this encounter: 1.834 m (6' 0.21\").    Weight as of this encounter: 103.2 kg (227 lb 9.6 oz).    Physical Exam  GENERAL: alert and no distress  EYES: Eyes grossly normal to inspection, PERRL and conjunctivae and sclerae normal  HENT: ear canals and TM's normal, nose and mouth without ulcers or lesions  NECK: no adenopathy, no asymmetry, masses, or scars  RESP: lungs clear to auscultation - no rales, rhonchi or wheezes  CV: regular rate and rhythm, normal S1 S2, no S3 or S4, no murmur, click or rub, no peripheral edema  ABDOMEN: soft, nontender, no hepatosplenomegaly, no masses and bowel sounds normal  MS: no gross musculoskeletal defects noted, no edema  SKIN: no suspicious lesions or rashes  NEURO: Normal strength and tone, mentation intact and speech normal  PSYCH: mentation appears normal, affect normal/bright        Signed Electronically by: Claire Wilkins DNP    "

## 2024-06-08 LAB
ALBUMIN SERPL BCG-MCNC: 4.3 G/DL (ref 3.5–5.2)
ALP SERPL-CCNC: 59 U/L (ref 40–150)
ALT SERPL W P-5'-P-CCNC: 89 U/L (ref 0–70)
ANION GAP SERPL CALCULATED.3IONS-SCNC: 9 MMOL/L (ref 7–15)
AST SERPL W P-5'-P-CCNC: 76 U/L (ref 0–45)
BILIRUB SERPL-MCNC: 0.5 MG/DL
BUN SERPL-MCNC: 15 MG/DL (ref 6–20)
C TRACH DNA SPEC QL NAA+PROBE: NEGATIVE
CALCIUM SERPL-MCNC: 9.5 MG/DL (ref 8.6–10)
CHLORIDE SERPL-SCNC: 103 MMOL/L (ref 98–107)
CHOLEST SERPL-MCNC: 281 MG/DL
CREAT SERPL-MCNC: 0.93 MG/DL (ref 0.67–1.17)
CREAT UR-MCNC: 196 MG/DL
DEPRECATED HCO3 PLAS-SCNC: 26 MMOL/L (ref 22–29)
EGFRCR SERPLBLD CKD-EPI 2021: >90 ML/MIN/1.73M2
FASTING STATUS PATIENT QL REPORTED: YES
FASTING STATUS PATIENT QL REPORTED: YES
GLUCOSE SERPL-MCNC: 94 MG/DL (ref 70–99)
HDLC SERPL-MCNC: 61 MG/DL
HIV 1+2 AB+HIV1 P24 AG SERPL QL IA: REACTIVE
LDLC SERPL CALC-MCNC: 167 MG/DL
LIPASE SERPL-CCNC: 63 U/L (ref 13–60)
MICROALBUMIN UR-MCNC: <12 MG/L
MICROALBUMIN/CREAT UR: NORMAL MG/G{CREAT}
N GONORRHOEA DNA SPEC QL NAA+PROBE: NEGATIVE
NONHDLC SERPL-MCNC: 220 MG/DL
POTASSIUM SERPL-SCNC: 4 MMOL/L (ref 3.4–5.3)
PROT SERPL-MCNC: 6.8 G/DL (ref 6.4–8.3)
PSA SERPL DL<=0.01 NG/ML-MCNC: 0.21 NG/ML (ref 0–3.5)
SODIUM SERPL-SCNC: 138 MMOL/L (ref 135–145)
T PALLIDUM AB SER QL: NONREACTIVE
TRIGL SERPL-MCNC: 267 MG/DL
TSH SERPL DL<=0.005 MIU/L-ACNC: 0.79 UIU/ML (ref 0.3–4.2)

## 2024-06-10 ENCOUNTER — LAB (OUTPATIENT)
Dept: LAB | Facility: CLINIC | Age: 49
End: 2024-06-10
Payer: COMMERCIAL

## 2024-06-10 ENCOUNTER — TELEPHONE (OUTPATIENT)
Dept: FAMILY MEDICINE | Facility: CLINIC | Age: 49
End: 2024-06-10
Payer: COMMERCIAL

## 2024-06-10 DIAGNOSIS — Z11.4 SCREENING FOR HIV (HUMAN IMMUNODEFICIENCY VIRUS): ICD-10-CM

## 2024-06-10 DIAGNOSIS — Z11.4 SCREENING FOR HIV (HUMAN IMMUNODEFICIENCY VIRUS): Primary | ICD-10-CM

## 2024-06-10 DIAGNOSIS — Z21 HIV ANTIBODY POSITIVE (H): ICD-10-CM

## 2024-06-10 LAB
HIV 1+2 AB+HIV1 P24 AG SERPL QL IA: REACTIVE
HIV 1+2 AB+HIV1P24 AG SERPLBLD IA.RAPID: ABNORMAL
HIV 2 AB SERPLBLD QL IA.RAPID: NONREACTIVE
HIV1 AB SERPLBLD QL IA.RAPID: REACTIVE

## 2024-06-10 PROCEDURE — 86702 HIV-2 ANTIBODY: CPT | Mod: 59

## 2024-06-10 PROCEDURE — 86701 HIV-1ANTIBODY: CPT | Mod: 59

## 2024-06-10 PROCEDURE — 36415 COLL VENOUS BLD VENIPUNCTURE: CPT

## 2024-06-10 PROCEDURE — 87389 HIV-1 AG W/HIV-1&-2 AB AG IA: CPT

## 2024-06-10 NOTE — TELEPHONE ENCOUNTER
"Claire,     Pt called regarding his HIV 1 \"Reactive\" result. Please call pt for advisement.     Gabriela CURRIE RN  [facility]    "

## 2024-06-10 NOTE — TELEPHONE ENCOUNTER
Patient called back stating:  BRANDON Wilkins CNP, left him a voicemail message  Need to schedule a lab appointment    Writer reviewed plan per BRANDON Wilkins CNP, with patient, along with scheduling number for Infectious Disease.    Lab appt scheduled today, 6/10/24 at 1630. Visit date, time and location confirmed with patient.    BRANDON Wilkins CNP, informed.    KATHYA العليN, RN-BC  [facility]

## 2024-06-10 NOTE — TELEPHONE ENCOUNTER
Attempted to call, left VM.  Repeating test and will refer to ID for additional work up and labs. Please facilitate getting patient in for repeat testing ASAP.

## 2024-06-11 DIAGNOSIS — B20 HIV-1 INFECTION, SYMPTOMATIC (H): Primary | ICD-10-CM

## 2024-06-11 LAB
HIV 1+2 AB+HIV1P24 AG SERPLBLD IA.RAPID: ABNORMAL
HIV 2 AB SERPLBLD QL IA.RAPID: NONREACTIVE
HIV1 AB SERPLBLD QL IA.RAPID: REACTIVE

## 2024-06-11 NOTE — PROGRESS NOTES
Ffull complement of B20 lab orders entered as Pt has recently been diagnosed with B20 and will be scheduled to see an ID doctor at [facility]      Kade Juárez RN  Infectious Disease 12:32 PM 06/11/24

## 2024-06-11 NOTE — CONFIDENTIAL NOTE
DIAGNOSIS:   HIV antibody positive    DATE RECEIVED:  06.12.2024    NOTES (Gather within 2 years) STATUS DETAILS   OFFICE NOTE from referring provider   Internal 06.10.2024 Claire Wilkins, BLANQUITA    LABS (any labs) Internal    MEDICATION LIST Internal

## 2024-06-12 ENCOUNTER — PRE VISIT (OUTPATIENT)
Dept: INFECTIOUS DISEASES | Facility: CLINIC | Age: 49
End: 2024-06-12

## 2024-06-12 ENCOUNTER — PATIENT OUTREACH (OUTPATIENT)
Dept: INFECTIOUS DISEASES | Facility: CLINIC | Age: 49
End: 2024-06-12

## 2024-06-12 ENCOUNTER — LAB (OUTPATIENT)
Dept: LAB | Facility: CLINIC | Age: 49
End: 2024-06-12
Attending: STUDENT IN AN ORGANIZED HEALTH CARE EDUCATION/TRAINING PROGRAM
Payer: COMMERCIAL

## 2024-06-12 ENCOUNTER — OFFICE VISIT (OUTPATIENT)
Dept: PHARMACY | Facility: CLINIC | Age: 49
End: 2024-06-12
Payer: COMMERCIAL

## 2024-06-12 ENCOUNTER — OFFICE VISIT (OUTPATIENT)
Dept: INFECTIOUS DISEASES | Facility: CLINIC | Age: 49
End: 2024-06-12
Attending: STUDENT IN AN ORGANIZED HEALTH CARE EDUCATION/TRAINING PROGRAM
Payer: COMMERCIAL

## 2024-06-12 VITALS
OXYGEN SATURATION: 97 % | WEIGHT: 229 LBS | DIASTOLIC BLOOD PRESSURE: 96 MMHG | BODY MASS INDEX: 30.88 KG/M2 | SYSTOLIC BLOOD PRESSURE: 147 MMHG | TEMPERATURE: 97.6 F | HEART RATE: 114 BPM

## 2024-06-12 DIAGNOSIS — B20 HIV-1 INFECTION, SYMPTOMATIC (H): ICD-10-CM

## 2024-06-12 DIAGNOSIS — B20 HUMAN IMMUNODEFICIENCY VIRUS (HIV) DISEASE (H): Primary | ICD-10-CM

## 2024-06-12 DIAGNOSIS — Z11.3 SCREENING EXAMINATION FOR STD (SEXUALLY TRANSMITTED DISEASE): ICD-10-CM

## 2024-06-12 DIAGNOSIS — Z21 HIV ANTIBODY POSITIVE (H): ICD-10-CM

## 2024-06-12 DIAGNOSIS — B20 HIV-1 INFECTION, SYMPTOMATIC (H): Primary | ICD-10-CM

## 2024-06-12 LAB
ALBUMIN SERPL BCG-MCNC: 4.3 G/DL (ref 3.5–5.2)
ALBUMIN UR-MCNC: NEGATIVE MG/DL
ALP SERPL-CCNC: 62 U/L (ref 40–150)
ALT SERPL W P-5'-P-CCNC: 59 U/L (ref 0–70)
AMYLASE SERPL-CCNC: 31 U/L (ref 28–100)
ANION GAP SERPL CALCULATED.3IONS-SCNC: 13 MMOL/L (ref 7–15)
APPEARANCE UR: CLEAR
AST SERPL W P-5'-P-CCNC: 50 U/L (ref 0–45)
BASOPHILS # BLD AUTO: 0 10E3/UL (ref 0–0.2)
BASOPHILS NFR BLD AUTO: 0 %
BILIRUB SERPL-MCNC: 0.3 MG/DL
BILIRUB UR QL STRIP: NEGATIVE
BUN SERPL-MCNC: 20.2 MG/DL (ref 6–20)
CALCIUM SERPL-MCNC: 9.1 MG/DL (ref 8.6–10)
CD3 CELLS # BLD: 1460 CELLS/UL (ref 603–2990)
CD3 CELLS NFR BLD: 86 % (ref 49–84)
CD3+CD4+ CELLS # BLD: 364 CELLS/UL (ref 441–2156)
CD3+CD4+ CELLS NFR BLD: 22 % (ref 28–63)
CD3+CD4+ CELLS/CD3+CD8+ CLL BLD: 0.33 % (ref 1.4–2.6)
CD3+CD8+ CELLS # BLD: 1111 CELLS/UL (ref 125–1312)
CD3+CD8+ CELLS NFR BLD: 66 % (ref 10–40)
CHLORIDE SERPL-SCNC: 105 MMOL/L (ref 98–107)
CMV IGG SERPL IA-ACNC: 1.9 U/ML
CMV IGG SERPL IA-ACNC: ABNORMAL
COLOR UR AUTO: ABNORMAL
CREAT SERPL-MCNC: 0.82 MG/DL (ref 0.67–1.17)
DEPRECATED HCO3 PLAS-SCNC: 25 MMOL/L (ref 22–29)
EGFRCR SERPLBLD CKD-EPI 2021: >90 ML/MIN/1.73M2
EOSINOPHIL # BLD AUTO: 0 10E3/UL (ref 0–0.7)
EOSINOPHIL NFR BLD AUTO: 0 %
ERYTHROCYTE [DISTWIDTH] IN BLOOD BY AUTOMATED COUNT: 13.2 % (ref 10–15)
GLUCOSE SERPL-MCNC: 117 MG/DL (ref 70–99)
GLUCOSE UR STRIP-MCNC: NEGATIVE MG/DL
HCT VFR BLD AUTO: 39.8 % (ref 40–53)
HGB BLD-MCNC: 14.5 G/DL (ref 13.3–17.7)
HGB UR QL STRIP: NEGATIVE
IMM GRANULOCYTES # BLD: 0 10E3/UL
IMM GRANULOCYTES NFR BLD: 0 %
KETONES UR STRIP-MCNC: ABNORMAL MG/DL
LEUKOCYTE ESTERASE UR QL STRIP: NEGATIVE
LIPASE SERPL-CCNC: 48 U/L (ref 13–60)
LYMPHOCYTES # BLD AUTO: 1.6 10E3/UL (ref 0.8–5.3)
LYMPHOCYTES NFR BLD AUTO: 54 %
MCH RBC QN AUTO: 33.4 PG (ref 26.5–33)
MCHC RBC AUTO-ENTMCNC: 36.4 G/DL (ref 31.5–36.5)
MCV RBC AUTO: 92 FL (ref 78–100)
MONOCYTES # BLD AUTO: 0.3 10E3/UL (ref 0–1.3)
MONOCYTES NFR BLD AUTO: 9 %
MUCOUS THREADS #/AREA URNS LPF: PRESENT /LPF
NEUTROPHILS # BLD AUTO: 1.1 10E3/UL (ref 1.6–8.3)
NEUTROPHILS NFR BLD AUTO: 37 %
NITRATE UR QL: NEGATIVE
NRBC # BLD AUTO: 0 10E3/UL
NRBC BLD AUTO-RTO: 0 /100
PH UR STRIP: 7 [PH] (ref 5–7)
PLATELET # BLD AUTO: 177 10E3/UL (ref 150–450)
POTASSIUM SERPL-SCNC: 4 MMOL/L (ref 3.4–5.3)
PROT SERPL-MCNC: 7 G/DL (ref 6.4–8.3)
RBC # BLD AUTO: 4.34 10E6/UL (ref 4.4–5.9)
RBC URINE: 1 /HPF
SODIUM SERPL-SCNC: 143 MMOL/L (ref 135–145)
SP GR UR STRIP: 1.02 (ref 1–1.03)
T CELL COMMENT: ABNORMAL
T PALLIDUM AB SER QL: NONREACTIVE
UROBILINOGEN UR STRIP-MCNC: NORMAL MG/DL
WBC # BLD AUTO: 3 10E3/UL (ref 4–11)
WBC URINE: <1 /HPF

## 2024-06-12 PROCEDURE — 99214 OFFICE O/P EST MOD 30 MIN: CPT | Mod: GC | Performed by: STUDENT IN AN ORGANIZED HEALTH CARE EDUCATION/TRAINING PROGRAM

## 2024-06-12 PROCEDURE — 87389 HIV-1 AG W/HIV-1&-2 AB AG IA: CPT | Performed by: STUDENT IN AN ORGANIZED HEALTH CARE EDUCATION/TRAINING PROGRAM

## 2024-06-12 PROCEDURE — 80053 COMPREHEN METABOLIC PANEL: CPT | Performed by: PATHOLOGY

## 2024-06-12 PROCEDURE — 81381 HLA I TYPING 1 ALLELE HR: CPT | Performed by: STUDENT IN AN ORGANIZED HEALTH CARE EDUCATION/TRAINING PROGRAM

## 2024-06-12 PROCEDURE — 86777 TOXOPLASMA ANTIBODY: CPT | Performed by: STUDENT IN AN ORGANIZED HEALTH CARE EDUCATION/TRAINING PROGRAM

## 2024-06-12 PROCEDURE — 87536 HIV-1 QUANT&REVRSE TRNSCRPJ: CPT | Mod: XU | Performed by: STUDENT IN AN ORGANIZED HEALTH CARE EDUCATION/TRAINING PROGRAM

## 2024-06-12 PROCEDURE — 81001 URINALYSIS AUTO W/SCOPE: CPT | Performed by: PATHOLOGY

## 2024-06-12 PROCEDURE — 82150 ASSAY OF AMYLASE: CPT | Performed by: PATHOLOGY

## 2024-06-12 PROCEDURE — 86704 HEP B CORE ANTIBODY TOTAL: CPT | Performed by: STUDENT IN AN ORGANIZED HEALTH CARE EDUCATION/TRAINING PROGRAM

## 2024-06-12 PROCEDURE — 86708 HEPATITIS A ANTIBODY: CPT | Performed by: STUDENT IN AN ORGANIZED HEALTH CARE EDUCATION/TRAINING PROGRAM

## 2024-06-12 PROCEDURE — 86803 HEPATITIS C AB TEST: CPT | Performed by: STUDENT IN AN ORGANIZED HEALTH CARE EDUCATION/TRAINING PROGRAM

## 2024-06-12 PROCEDURE — 87491 CHLMYD TRACH DNA AMP PROBE: CPT | Mod: XU | Performed by: STUDENT IN AN ORGANIZED HEALTH CARE EDUCATION/TRAINING PROGRAM

## 2024-06-12 PROCEDURE — 87591 N.GONORRHOEAE DNA AMP PROB: CPT | Performed by: STUDENT IN AN ORGANIZED HEALTH CARE EDUCATION/TRAINING PROGRAM

## 2024-06-12 PROCEDURE — 86359 T CELLS TOTAL COUNT: CPT | Performed by: STUDENT IN AN ORGANIZED HEALTH CARE EDUCATION/TRAINING PROGRAM

## 2024-06-12 PROCEDURE — 87340 HEPATITIS B SURFACE AG IA: CPT | Performed by: STUDENT IN AN ORGANIZED HEALTH CARE EDUCATION/TRAINING PROGRAM

## 2024-06-12 PROCEDURE — 86706 HEP B SURFACE ANTIBODY: CPT | Performed by: STUDENT IN AN ORGANIZED HEALTH CARE EDUCATION/TRAINING PROGRAM

## 2024-06-12 PROCEDURE — 99000 SPECIMEN HANDLING OFFICE-LAB: CPT | Performed by: PATHOLOGY

## 2024-06-12 PROCEDURE — 85025 COMPLETE CBC W/AUTO DIFF WBC: CPT | Performed by: PATHOLOGY

## 2024-06-12 PROCEDURE — 99207 PR NO CHARGE LOS: CPT | Performed by: PHARMACIST

## 2024-06-12 PROCEDURE — 83690 ASSAY OF LIPASE: CPT | Performed by: PATHOLOGY

## 2024-06-12 PROCEDURE — 86780 TREPONEMA PALLIDUM: CPT | Performed by: STUDENT IN AN ORGANIZED HEALTH CARE EDUCATION/TRAINING PROGRAM

## 2024-06-12 PROCEDURE — 99213 OFFICE O/P EST LOW 20 MIN: CPT | Performed by: STUDENT IN AN ORGANIZED HEALTH CARE EDUCATION/TRAINING PROGRAM

## 2024-06-12 PROCEDURE — 36415 COLL VENOUS BLD VENIPUNCTURE: CPT | Performed by: PATHOLOGY

## 2024-06-12 PROCEDURE — 86701 HIV-1ANTIBODY: CPT | Performed by: STUDENT IN AN ORGANIZED HEALTH CARE EDUCATION/TRAINING PROGRAM

## 2024-06-12 PROCEDURE — 86644 CMV ANTIBODY: CPT | Performed by: STUDENT IN AN ORGANIZED HEALTH CARE EDUCATION/TRAINING PROGRAM

## 2024-06-12 ASSESSMENT — PAIN SCALES - GENERAL: PAINLEVEL: NO PAIN (0)

## 2024-06-12 NOTE — PROGRESS NOTES
[facility]    Division of Infectious Diseases and International Medicine    HIV OUTPATIENT VISIT NOTE   Patient:  Hector Ramirez, Date of birth 1975, Medical record number [id number]  Date: 6/12/2024  Reason: HIV follow-up       Assessment and Plan     # HIV  Asymptomatic now besides mouth sores (appear aphthous ulcers on exam) and relatively new infection based on story (sex in February/March with an acute viral syndrome for 2 weeks shortly after). Awaiting confirmatory viral load but history is consistent.      - ART: Start Biktarvy today. Discussed potential side effects. Patient noted headache on Truvada, but willing to trial Biktarvy since it is first-line one pill once daily option and has different form of tenofovir in it.  Patient is tearful but understanding of diagnosis. Discussed expect course of therapy.  - Medication Adherence discussed  - Opportunistic Infection prophylaxis Not indicated; expect CD4 count will not be <200 based on time course of infection and normal ALC today (1.6K)  - Discussed vaccines and his ASCVD risk (5.4%) and that he is indicated to start a statin soon  - Routine HIV labs obtained. Genotype added-on. Communication via [email or web address] preferred.  - Follow-up in 1 month with HIV physician here at [facility] as I am graduating fellowship    # Preventative Medicine  Sexually Transmitted Infection Risk and Screening:  Syphilis: negative 6/2024  Urine G&C negative 6/2024  Throat/rectal swabs today  Discussed DoxyPEP for the future - he is not currently sexually active.  Discussed U=U    Cancer screening: TBD future visit - did not discuss today    Bone Health: TBD future visit - did not discuss today    Immunizations:  Discussed that he is due for multiple vaccines but we should wait until HIV under control  COVID-19: UTD; last 10/2023  Influenza: Recommended Seasonal Vaccine  HPV: Aged out  Hepatitis A: Pending  Hepatitis B:  Pending - reports prior  cleared infection  Tdap: UTD last 2018  PCV13->23 Due at future visit  Meningococcus: Due   Mpox Due - patient would be interested  Varicella - had chickenpox has a child, interested in Shingrix since his mother has had Shingles twice    Cardiovascular Hakan:                        Lipids: 6/2024 - elevated (, , HDL 61). ASCVD 5.4%, indicated for moderate-high intensity statin based on his baseline risk + REPRIEVE trial.              Blood Pressure: On HTN meds, follows w PCP; elevated today, but anxious related to new HIV dx    Renal Health: Monitor   Creatinine   Date Value Ref Range Status   06/07/2024 0.93 0.67 - 1.17 mg/dL Final   08/15/2005 1.00 0.80 - 1.50 mg/dL Final             HPI/Subjective     49 year old man here for new HIV diagnosis    PMHx - HTN, high cholesterol, insomnia, DIANNE, alcohol use disorder. Admitted 10/2022 for alcoholic pancreatitis and alcohol withdrawal. Says he is drinking less since then.     He notes he had sex with two individuals in [place] in February/March and then felt ill for 2 weeks with sore throat, body aches, and generalized fatigue. No injection drug use.    Was taking PrEP for awhile, but stopped in early 2024 when he ran out.     Notes a history of MANDIE and cleared hepatitis B (Both remote)    Had been to [place], [place], and [place]. No known Tb exposure. Never experienced homelessness or incarceration.    Pharmacy: [facility] on [facility]        HIV Labs and History:     HIV  Acquired: Positive 6/2024. Negative 11/2022. Likely MSM.   Presentation: Seroconversion acute illness March 2024   Medication History: None. Was taking Truvada for PrEP but stopped a month or two prior (ran out).    6/2024 labs:  Cr 0.93  ALT 89, AST 78, AP/Tbili normal    Prior OI  None    Co-morbid Infections  Hep B: Reports prior cleared infection  Hep C: negative 11/2022  TB Screening: Low risk, consider in future  CMV: pending  Toxoplasmosis: pending    Other STD  Syphilis:  negative 6/2024  Urine G&C negative 6/2024    LABS  Treponema Antibody Total   Date Value Ref Range Status   06/07/2024 Nonreactive Nonreactive Final           Other Medical History:     Attempt was made to collect past, family and social history during this encounter,  this information was reviewed with the patient and updated    Allergies:  Patient has no known allergies.    Past Medical History  No past medical history on file. PastSurgical History   has no past surgical history on file.   Family History  No family history on file. Social History  He reports that he has never smoked. He has never used smokeless tobacco. He reports current alcohol use. He reports that he does not use drugs.    Prefers men, versatile. No partners right now.     [place], [place]  - parents snowbird down there and he has a house. Spend the hunter. Lives in [place] in the rest of the year. Work in [employer] for [employer]. Working from home.     No substance use. Some alcohol use, though less than prior.    No significant Tb exposure.   Notable Exposures  None Vaccination History:  Immunization History   Administered Date(s) Administered    COVID-19 12+ (2023-24) (Pfizer) 10/16/2023    COVID-19 Bivalent 12+ (Pfizer) 09/20/2022    COVID-19 MONOVALENT 12+ (Pfizer) 03/29/2021, 04/22/2021, 10/27/2021    DTaP, Unspecified 06/02/2006    Flu, Unspecified 12/10/2010    HepA-Peds, Unspecified 12/28/1999, 07/13/2000    Influenza (IIV3) PF 11/11/2014    Influenza Vaccine >6 months,quad, PF 11/02/2015, 11/15/2016, 11/09/2018, 11/08/2019, 10/20/2020, 09/28/2021    Influenza Vaccine IM Ages 6-35 Months 4 Valent (PF) 09/20/2022    Influenza,INJ,MDCK,PF,Quad >6mo(Flucelvax) 10/16/2023    TDAP Vaccine (Adacel) 06/02/2006    Td (Adult), Adsorbed 12/30/1998, 11/09/2018    Tdap (Adult) Unspecified Formulation 11/09/2018             Physical Exam:     VITAL SIGNS:  BP (!) 147/96 (BP Location: Right arm, Cuff Size: Adult Regular)   Pulse 114    Temp 97.6  F (36.4  C)   Wt 103.9 kg (229 lb)   SpO2 97%   BMI 30.88 kg/m      PHYSICAL EXAM:  Eyes:     no ptosis, no discharge, no scleral icterus  Mouth, Throat:     mucous membranes moist, pharynx normal without lesions. Two small white lesions on the left buccal mucosa most c/w aphthous ulcers.  Cardiovascular:    Inspection: No Cyanosis, JVD not elevated   Auscultation:  S1, S2 normal, regular rate and rhythm  Respiratory:     Inspection: Not in respiratory distress, Chest expansion symmetrical   Auscultation: 4 point auscultation done clear to auscultation bilaterally, no wheezes, no rales, and no rhonchi  Gastrointestinal:      soft, non-tender; bowel sounds normal; no masses,  no organomegaly  Musculoskeletal:     no elbow wrist knee or ankle deformity or swelling  Skin:     Exposed skin is dry without rash or ulcer  Neurologic:     Higher Mental Function: Conversant, AOx4   Motor: Ambulatory   Sensory: crude touch intact in all 4 limbs  Psychiatric:     appropriate        Signature:     Juan R Ayala MD   PGY-V Infectious Disease Fellow

## 2024-06-12 NOTE — LETTER
6/12/2024       RE: Hector Ramirez  [address]  [facility] [address]     Dear Colleague,    Thank you for referring your patient, Hector Ramirez, to the [facility] [place] at [facility]. Please see a copy of my visit note below.     [facility]    Division of Infectious Diseases and International Medicine    HIV OUTPATIENT VISIT NOTE   Patient:  Hector Ramirez, Date of birth 1975, Medical record number [id number]  Date: 6/12/2024  Reason: HIV follow-up       Assessment and Plan     # HIV  Asymptomatic now besides mouth sores (appear aphthous ulcers on exam) and relatively new infection based on story (sex in February/March with an acute viral syndrome for 2 weeks shortly after). Awaiting confirmatory viral load but history is consistent.      - ART: Start Biktarvy today. Discussed potential side effects. Patient noted headache on Truvada, but willing to trial Biktarvy since it is first-line one pill once daily option and has different form of tenofovir in it.  Patient is tearful but understanding of diagnosis. Discussed expect course of therapy.  - Medication Adherence discussed  - Opportunistic Infection prophylaxis Not indicated; expect CD4 count will not be <200 based on time course of infection and normal ALC today (1.6K)  - Discussed vaccines and his ASCVD risk (5.4%) and that he is indicated to start a statin soon  - Routine HIV labs obtained. Genotype added-on. Communication via [employer] preferred.  - Follow-up in 1 month with HIV physician here at [facility] as I am graduating fellowship    # Preventative Medicine  Sexually Transmitted Infection Risk and Screening:  Syphilis: negative 6/2024  Urine G&C negative 6/2024  Throat/rectal swabs today  Discussed DoxyPEP for the future - he is not currently sexually active.  Discussed U=U    Cancer screening: TBD future visit - did not discuss  today    Bone Health: TBD future visit - did not discuss today    Immunizations:  Discussed that he is due for multiple vaccines but we should wait until HIV under control  COVID-19: UTD; last 10/2023  Influenza: Recommended Seasonal Vaccine  HPV: Aged out  Hepatitis A: Pending  Hepatitis B:  Pending - reports prior cleared infection  Tdap: UTD last 2018  PCV13->23 Due at future visit  Meningococcus: Due   Mpox Due - patient would be interested  Varicella - had chickenpox has a child, interested in Shingrix since his mother has had Shingles twice    Cardiovascular Hakan:                        Lipids: 6/2024 - elevated (, , HDL 61). ASCVD 5.4%, indicated for moderate-high intensity statin based on his baseline risk + REPRIEVE trial.              Blood Pressure: On HTN meds, follows w PCP; elevated today, but anxious related to new HIV dx    Renal Health: Monitor   Creatinine   Date Value Ref Range Status   06/07/2024 0.93 0.67 - 1.17 mg/dL Final   08/15/2005 1.00 0.80 - 1.50 mg/dL Final             HPI/Subjective     49 year old man here for new HIV diagnosis    PMHx - HTN, high cholesterol, insomnia, DIANNE, alcohol use disorder. Admitted 10/2022 for alcoholic pancreatitis and alcohol withdrawal. Says he is drinking less since then.     He notes he had sex with two individuals in [place] in February/March and then felt ill for 2 weeks with sore throat, body aches, and generalized fatigue. No injection drug use.    Was taking PrEP for awhile, but stopped in early 2024 when he ran out.     Notes a history of MANDIE and cleared hepatitis B (Both remote)    Had been to [place], [place], and [place]. No known Tb exposure. Never experienced homelessness or incarceration.    Pharmacy: [facility] on [facility]        HIV Labs and History:     HIV  Acquired: Positive 6/2024. Negative 11/2022. Likely MSM.   Presentation: Seroconversion acute illness March 2024   Medication History: None. Was taking Truvada for  PrEP but stopped a month or two prior (ran out).    6/2024 labs:  Cr 0.93  ALT 89, AST 78, AP/Tbili normal    Prior OI  None    Co-morbid Infections  Hep B: Reports prior cleared infection  Hep C: negative 11/2022  TB Screening: Low risk, consider in future  CMV: pending  Toxoplasmosis: pending    Other STD  Syphilis: negative 6/2024  Urine G&C negative 6/2024    LABS  Treponema Antibody Total   Date Value Ref Range Status   06/07/2024 Nonreactive Nonreactive Final           Other Medical History:     Attempt was made to collect past, family and social history during this encounter,  this information was reviewed with the patient and updated    Allergies:  Patient has no known allergies.    Past Medical History  No past medical history on file. PastSurgical History   has no past surgical history on file.   Family History  No family history on file. Social History  He reports that he has never smoked. He has never used smokeless tobacco. He reports current alcohol use. He reports that he does not use drugs.    Prefers men, versatile. No partners right now.     [place], [place]  - parents snowbird down there and he has a house. Spend the hunter. Lives in [place] in the rest of the year. Work in  for [employer]. Working from home.     No substance use. Some alcohol use, though less than prior.    No significant Tb exposure.   Notable Exposures  None Vaccination History:  Immunization History   Administered Date(s) Administered    COVID-19 12+ (2023-24) (Pfizer) 10/16/2023    COVID-19 Bivalent 12+ (Pfizer) 09/20/2022    COVID-19 MONOVALENT 12+ (Pfizer) 03/29/2021, 04/22/2021, 10/27/2021    DTaP, Unspecified 06/02/2006    Flu, Unspecified 12/10/2010    HepA-Peds, Unspecified 12/28/1999, 07/13/2000    Influenza (IIV3) PF 11/11/2014    Influenza Vaccine >6 months,quad, PF 11/02/2015, 11/15/2016, 11/09/2018, 11/08/2019, 10/20/2020, 09/28/2021    Influenza Vaccine IM Ages 6-35 Months 4 Valent (PF) 09/20/2022     Influenza,INJ,MDCK,PF,Quad >6mo(Flucelvax) 10/16/2023    TDAP Vaccine (Adacel) 06/02/2006    Td (Adult), Adsorbed 12/30/1998, 11/09/2018    Tdap (Adult) Unspecified Formulation 11/09/2018             Physical Exam:     VITAL SIGNS:  BP (!) 147/96 (BP Location: Right arm, Cuff Size: Adult Regular)   Pulse 114   Temp 97.6  F (36.4  C)   Wt 103.9 kg (229 lb)   SpO2 97%   BMI 30.88 kg/m      PHYSICAL EXAM:  Eyes:     no ptosis, no discharge, no scleral icterus  Mouth, Throat:     mucous membranes moist, pharynx normal without lesions. Two small white lesions on the left buccal mucosa most c/w aphthous ulcers.  Cardiovascular:    Inspection: No Cyanosis, JVD not elevated   Auscultation:  S1, S2 normal, regular rate and rhythm  Respiratory:     Inspection: Not in respiratory distress, Chest expansion symmetrical   Auscultation: 4 point auscultation done clear to auscultation bilaterally, no wheezes, no rales, and no rhonchi  Gastrointestinal:      soft, non-tender; bowel sounds normal; no masses,  no organomegaly  Musculoskeletal:     no elbow wrist knee or ankle deformity or swelling  Skin:     Exposed skin is dry without rash or ulcer  Neurologic:     Higher Mental Function: Conversant, AOx4   Motor: Ambulatory   Sensory: crude touch intact in all 4 limbs  Psychiatric:     appropriate        Signature:     Juan R Ayala MD   PGY-V Infectious Disease Fellow     Attestation signed by Nadir Smith MD at 6/13/2024  2:45 PM:  Physician Attestation  I, Nadir Smith MD, saw this patient and agree with the findings and plan of care as documented in the note.      Items personally reviewed/procedural attestation: vitals, labs,and agree with the interpretation documented in the note.    Nadir Smith MD     RN Care Coordinator B20 Patient Assessment    New B20 Dx     Medical Hx    Patient presents to clinic accompanied by: self     Where patient received previous care: PCP     Where patient is  at/ How patient is feeling: shock has always been been on PrEp stopped a couple months ago due to running out    Patient's current knowledge/understanding of HIV:  Pretty knowledgeable being a pereira male     Updated Med List: Yes     Mental health concerns: depression anxiety PTSD hx of alcohol counseling use occasionally     Medical Concerns/ Goals of care: HTN, cholesterol high     Discussed disclosure of status with partners: no current partner. Has not told anyone.         Care Coordination  Privacy concerns: none     Demographics updated: yes     PRINCE's signed: signed care everywhere and put to be scanned     Insurance concerns: none     Transportation concerns: none     Pharmacy preference (Mail or ):  [facility]    Case management: none     Co-Pay Concerns: none    Financial concerns:  none     Provider Preferences: will transfer to Dr. Karthik LIGHT Assessment of care needs: RN support, will introduce to MTM and SW. Patient is new DX will need education PRN and on going support.     Patient to return on: EMILY Ayala MD

## 2024-06-12 NOTE — NURSING NOTE
"Chief Complaint   Patient presents with    Consult     B20     Vital signs:  Temp: 97.6  F (36.4  C)   BP: (!) 147/96 Pulse: 114     SpO2: 97 %       Weight: 103.9 kg (229 lb)  Estimated body mass index is 30.88 kg/m  as calculated from the following:    Height as of 6/7/24: 1.834 m (6' 0.21\").    Weight as of this encounter: 103.9 kg (229 lb).      Fatuma Dickerson CMA   6/12/2024 2:07 PM    "

## 2024-06-12 NOTE — PROGRESS NOTES
RN Care Coordinator B20 Patient Assessment    New B20 Dx     Medical Hx    Patient presents to clinic accompanied by: self     Where patient received previous care: PCP     Where patient is at/ How patient is feeling: shock has always been been on PrEp stopped a couple months ago due to running out    Patient's current knowledge/understanding of HIV:  Pretty knowledgeable being a pereira male     Updated Med List: Yes     Mental health concerns: depression anxiety PTSD hx of alcohol counseling use occasionally     Medical Concerns/ Goals of care: HTN, cholesterol high     Discussed disclosure of status with partners: no current partner. Has not told anyone.         Care Coordination  Privacy concerns: none     Demographics updated: yes     PRINCE's signed: signed care everywhere and put to be scanned     Insurance concerns: none     Transportation concerns: none     Pharmacy preference (Mail or ):  [facility]    Case management: none     Co-Pay Concerns: none    Financial concerns:  none     Provider Preferences: will transfer to Dr. Karthik LIGHT Assessment of care needs: RN support, will introduce to MTM and SW. Patient is new DX will need education PRN and on going support.     Patient to return on: PRN

## 2024-06-12 NOTE — TELEPHONE ENCOUNTER
Social Work - Intervention  [employer] [facility]    Data/Intervention: 2024    Patient Name: Hector Ramirez Goes By: Hector ROBBINS/Age: 1975 (49 year old)     Reason for Referral: New B20     Collaborated With:    -RN Team      Psychosocial Information/Concerns:  New B20, first appointment      Intervention/Education/Resources Provided:  Writer met with Patient and explained role. Checked in with Patient who reports he recently found out and is motivated to address his health. Patient became tearful and spoke about his feeling related to his diagnosis. Patient shared he has not shared this with others in his life when assessing for support system. Writer encouraged Patient to reach out to his care team if he needs anything, which Patient expressed gratitude for.      Assessment/Plan:  Patient thanked Writer for support and will reach out if he would like assistance with counseling resources. Patient stated he plans to continue to process this new dx.       SAMIR Kate, [facility]  Infectious Disease

## 2024-06-13 LAB
C TRACH DNA SPEC QL NAA+PROBE: NEGATIVE
C TRACH DNA SPEC QL NAA+PROBE: NEGATIVE
HAV AB SER QL IA: REACTIVE
HBV CORE AB SERPL QL IA: REACTIVE
HBV SURFACE AB SERPL IA-ACNC: >1000 M[IU]/ML
HBV SURFACE AB SERPL IA-ACNC: REACTIVE M[IU]/ML
HBV SURFACE AG SERPL QL IA: NONREACTIVE
HCV AB SERPL QL IA: NONREACTIVE
HIV 1+2 AB+HIV1 P24 AG SERPL QL IA: REACTIVE
HIV 1+2 AB+HIV1P24 AG SERPLBLD IA.RAPID: ABNORMAL
HIV 2 AB SERPLBLD QL IA.RAPID: NONREACTIVE
HIV1 AB SERPLBLD QL IA.RAPID: REACTIVE
HIV1 RNA # PLAS NAA DL=20: ABNORMAL COPIES/ML
HIV1 RNA SERPL NAA+PROBE-LOG#: 5.6 {LOG_COPIES}/ML
N GONORRHOEA DNA SPEC QL NAA+PROBE: NEGATIVE
N GONORRHOEA DNA SPEC QL NAA+PROBE: NEGATIVE

## 2024-06-13 NOTE — PATIENT INSTRUCTIONS
Recommendations from today's disease management visit:                                                      Bon Young,     It was great meeting you today! Let me know if you have any questions after starting Biktarvy.     Follow-up: as needed    To schedule another MTM appointment, please call the clinic directly or you may call the MTM scheduling line at [phone] or toll-free at [phone].     My Clinical Pharmacist's contact information:                                                      Please feel free to contact me with any questions or concerns you have.      Miya Coelho, PharmD, AAHIVP  Medication Therapy Management Pharmacist   [phone]

## 2024-06-13 NOTE — PROGRESS NOTES
Disease State Management Encounter:                          Hector Ramirez is a 49 year old male coming in for an initial visit. He was referred to me from Dr. Ayala.     Reason for visit: new antiretroviral therapy start     HIV:   No current medications. Planning to start Biktarvy   Diagnosis: new diagnosis 6/2024  Past regimens: none  Phenotype: ordered but not completed yet  HIV VL: 442,000 copies on 6/12/24  CD4: 364 on 6/12/24  Immunizations: up to date on covid, flu, tdap. Immune to hepA. Immune to hepB. HepB core ab is positive so likely immune through natural infection     Today's Vitals: There were no vitals taken for this visit.    Assessment/Plan:    Provided counseling on Biktarvy including how to take, missed dose management, side effects, and drug interactions. Signed patient up for a copay card in case it's needed.     Follow-up: as needed    I spent 6 minutes with this patient today. All changes were made via collaborative practice agreement with Dr. Ayala. A copy of the visit note was provided to the patient's provider(s).    A summary of these recommendations was sent via [employer].       Medication Therapy Recommendations  No medication therapy recommendations to display

## 2024-06-14 DIAGNOSIS — I10 ESSENTIAL HYPERTENSION: ICD-10-CM

## 2024-06-14 LAB — T GONDII IGG SER QL: <3 IU/ML (ref 0–7.1)

## 2024-06-17 ENCOUNTER — TELEPHONE (OUTPATIENT)
Dept: INFECTIOUS DISEASES | Facility: CLINIC | Age: 49
End: 2024-06-17
Payer: COMMERCIAL

## 2024-06-17 RX ORDER — METOPROLOL SUCCINATE 50 MG/1
50 TABLET, EXTENDED RELEASE ORAL AT BEDTIME
Qty: 90 TABLET | Refills: 0 | Status: SHIPPED | OUTPATIENT
Start: 2024-06-17 | End: 2024-09-27

## 2024-06-17 NOTE — TELEPHONE ENCOUNTER
[facility] Call Center    Phone Message    May a detailed message be left on voicemail: yes     Reason for Call: Medication Refill Request    Has the patient contacted the pharmacy for the refill? Yes   Name of medication being requested: Ambien  Provider who prescribed the medication: Jamie  Pharmacy: [facility] [address]  Date medication is needed: Routine    Pt reports he used to be on Ambien, and that his recent diagnosis has caused him to have difficulty sleeping lately, would like to have this refilled if possible.     Action Taken: Other: Infectious Disease    Travel Screening: Not Applicable

## 2024-06-18 LAB
HLA TYPE SA METHOD: NORMAL
HLA TYPE SA RESULT: NORMAL

## 2024-06-18 NOTE — TELEPHONE ENCOUNTER
We discussed in our clinic.  Ambien is not a medication I generally prescribe in my practice.  Would stick with the nortriptyline for at least a month to see how it goes.

## 2024-06-21 ENCOUNTER — LAB (OUTPATIENT)
Dept: LAB | Facility: CLINIC | Age: 49
End: 2024-06-21
Payer: COMMERCIAL

## 2024-06-21 DIAGNOSIS — B20 HIV-1 INFECTION, SYMPTOMATIC (H): ICD-10-CM

## 2024-06-21 DIAGNOSIS — Z21 HIV ANTIBODY POSITIVE (H): ICD-10-CM

## 2024-07-01 ENCOUNTER — PATIENT OUTREACH (OUTPATIENT)
Dept: INFECTIOUS DISEASES | Facility: CLINIC | Age: 49
End: 2024-07-01
Payer: COMMERCIAL

## 2024-07-01 NOTE — TELEPHONE ENCOUNTER
Social Work - Follow-Up  [facility]    Data/Intervention: 2024    Patient Name: Hector Ramirez Goes By: Hector    ITZEL/Age: 1975 (49 year old)    Reason for Follow-Up:  Patient called requesting to speak to Writer.     Collaborated With:    -Patient     Intervention/Education/Resources Provided:  Writer returned Patient's call. Patient reports that he is now ready to pursue mental health supports due to his new dx. Patient reports he continues to struggle with his diagnosis. Options for mental health care reviewed. Patient requested Writer email the options. Patient reports he is doing well overall and has shared with one close friend about his diagnosis. Patient also requested a list of LGBTQ+ resources, which Writer will also provide.     Assessment/Plan:  Writer emailed resources as discussed. Patient will reach out if further assistance is needed.    SAMIR Kate, [facility]  Infectious Disease

## 2024-07-02 ENCOUNTER — TELEPHONE (OUTPATIENT)
Dept: INFECTIOUS DISEASES | Facility: CLINIC | Age: 49
End: 2024-07-02
Payer: COMMERCIAL

## 2024-07-10 DIAGNOSIS — R11.0 NAUSEA: ICD-10-CM

## 2024-07-10 DIAGNOSIS — B20 HUMAN IMMUNODEFICIENCY VIRUS (HIV) DISEASE (H): Primary | ICD-10-CM

## 2024-07-10 RX ORDER — ONDANSETRON 4 MG/1
4 TABLET, FILM COATED ORAL EVERY 8 HOURS PRN
Qty: 20 TABLET | Refills: 1 | Status: SHIPPED | OUTPATIENT
Start: 2024-07-10

## 2024-07-12 ENCOUNTER — DOCUMENTATION ONLY (OUTPATIENT)
Dept: INFECTIOUS DISEASES | Facility: CLINIC | Age: 49
End: 2024-07-12
Payer: COMMERCIAL

## 2024-07-12 NOTE — PROGRESS NOTES
Maddison Ayala,    I am sorry to inform you but [employer] informed us today at [facility] that Hector's phenotyping test was cancelled due to the patient not being compatible with their testing assay is what was explained to me. This came from [facility]. Feel free to contact us at [phone] or [employer] if you have any questions or concerns.     Thank you,    Shaunna Bass

## 2024-07-17 NOTE — TELEPHONE ENCOUNTER
Paperwork filled out, signed by Dr. Angeles, faxed  and sent to HIM to scan.       Kade Juárez RN  Infectious Disease on 7/17/2024 at 1:52 PM

## 2024-07-25 ENCOUNTER — TELEPHONE (OUTPATIENT)
Dept: FAMILY MEDICINE | Facility: CLINIC | Age: 49
End: 2024-07-25
Payer: COMMERCIAL

## 2024-07-25 NOTE — TELEPHONE ENCOUNTER
Per patient he is establishing care with a new physician due to new dx so he will have them complete. Can be shredded-

## 2024-07-25 NOTE — TELEPHONE ENCOUNTER
Disability forms would require a visit, virtual is fine.  He also needs to come into clinic for RN BP recheck.

## 2024-07-30 NOTE — PROGRESS NOTES
[facility]  Dr. Ho Angeles, [facility], Floor 3  [address] [place] [address]   Patient:  Hector Ramirez, Date of birth 1975, Medical record number [id number]  Date of Visit:  07/31/2024         Assessment and Recommendations:   ID Problem List  Asymptomatic HIV  Latest HIV RNA (6/12/24) - 442,000  Latest CD4 (6/12/24) - 364  HLAB*57:01 negative (6/18/24)  Phenotype - unable to be run per [facility], repeating today  Toxoplasma IgG (6/12/24) - negative  CMV IgG (6/12/24) - 1.9  HAV Ab+  HBcAb+, HBsAb+, HBsAg- (immune due to natural infection)  HCV Ab-    Recommendations  Continue Biktarvy  HIV RNA ordered for today  HIV genotype ordered - [facility] could not run previous sample for some reason  Due for several vaccinations:  PCV20 x 1 dose today  Shingrix dose # 1 of 2 today - next dose in 2-6 months  Meningococcus - needs quadrivalent conjugated vaccine x 2 doses 8 weeks apart  Mpox - needs 2 doses 4 weeks apart  RTC in 1-2 months    Discussion  48yo M with h/o HTN, DIANNE, insomnia, HLD, and recent HIV diagnosis here for follow-up after initial visit with Dr. Ayala on 6/12/24.    Has been having some ongoing issues with bloating, abdominal discomfort, and insomnia - these had preceded Biktarvy, but seem to have been exacerbated a bit by this medication and the stress of his new diagnosis. Both pt and I discussed this with our [facility] pharmacist, and the plan will be to continue Biktarvy for now with the hopes that his viral load will drop sharply and allow for a transition to oral cabotegravir/rilpivirine, followed by transition to the injectable form once we're sure he tolerates the oral formulation.    Aside from this, pt received PCV20 and shingrix dose #1 today. Will need dose #2 of shingrix in 2-6 months, and will also need to start Mpox and Meningococcus series at our next visit in 1-2 months.    Ho Angeles MD  Division of Infectious Diseases and  International Medicine  P: [phone]    I spent at least 40 minutes on the day of this encounter on chart review, patient exam/interview, and note preparation.         History of Infectious Disease Illness:     50yo M with h/o HTN, DIANNE, insomnia, HLD, and recent HIV diagnosis here for follow-up after initial visit with Dr. Ayala on 6/12/24.    Pt was started on Biktarvy at his initial visit a bit over a month ago and has been tolerating it well. Says he has missed 1-2 doses. At that visit, he described having had sex with two individuals in [place] in February/March 2024 and then felt ill for 2 weeks with sore throat, body aches, and generalized fatigue. No injection drug use. He had been taking PrEP for awhile, but stopped in early 2024 when he ran out. Notes a history of MANDIE and cleared hepatitis B (Both remote).     Travel history includes trips to [facility], [place], and [place]. No known TB exposure. Never experienced homelessness or incarceration.     Pharmacy: [facility]        Past Medical and Surgical History:   PMHx: HTN, DIANNE, insomnia, HLD, EtOH abuse (improving), prior EtOH pancreatitis  PSHx: None on file        Family History:   Reviewed and non-contributory        Social History:     Social History     Tobacco Use    Smoking status: Never    Smokeless tobacco: Never   Vaping Use    Vaping status: Never Used   Substance Use Topics    Alcohol use: Yes    Drug use: Never     Social History     Social History Narrative    Not on file            Review of Systems:   10-system ROS was performed and negative unless otherwise stated above.         Current Medications:     Current Outpatient Medications   Medication Sig Dispense Refill    bictegravir-emtricitabine-tenofovir (BIKTARVY) -25 MG per tablet Take 1 tablet by mouth daily 90 tablet 1    hydroCHLOROthiazide 12.5 MG tablet Take 1 tablet (12.5 mg) by mouth daily 90 tablet 2    losartan (COZAAR) 50 MG tablet Take 1 tablet (50  mg) by mouth daily 90 tablet 0    metoprolol succinate ER (TOPROL XL) 50 MG 24 hr tablet Take 1 tablet (50 mg) by mouth at bedtime 90 tablet 0    nortriptyline (PAMELOR) 10 MG capsule Take 1 capsule (10 mg) by mouth at bedtime 90 capsule 0    ondansetron (ZOFRAN) 4 MG tablet Take 1 tablet (4 mg) by mouth every 8 hours as needed for nausea 20 tablet 1    sertraline (ZOLOFT) 50 MG tablet Take 1 tablet (50 mg) by mouth daily 90 tablet 1            Immunization History:     Immunization History   Administered Date(s) Administered    COVID-19 12+ (2023-24) (Pfizer) 10/16/2023    COVID-19 Bivalent 12+ (Pfizer) 09/20/2022    COVID-19 MONOVALENT 12+ (Pfizer) 03/29/2021, 04/22/2021, 10/27/2021    DTaP, Unspecified 06/02/2006    Flu, Unspecified 12/10/2010    HepA-Peds, Unspecified 12/28/1999, 07/13/2000    Influenza (IIV3) PF 11/11/2014    Influenza Vaccine >6 months,quad, PF 11/02/2015, 11/15/2016, 11/09/2018, 11/08/2019, 10/20/2020, 09/28/2021    Influenza Vaccine IM Ages 6-35 Months 4 Valent (PF) 09/20/2022    Influenza,INJ,MDCK,PF,Quad >6mo(Flucelvax) 10/16/2023    TDAP Vaccine (Adacel) 06/02/2006    Td (Adult), Adsorbed 12/30/1998, 11/09/2018    Tdap (Adult) Unspecified Formulation 11/09/2018            Allergies:   No Known Allergies         Physical Exam:   Vital signs:  There were no vitals taken for this visit.    Physical Examination:  GENERAL:  well-developed, well-nourished, seated in no acute distress.  HEENT:  Head is normocephalic, atraumatic   EYES:  Eyes have anicteric sclerae without conjunctival injection   ENT:  Oropharynx is moist without exudates or ulcers. Tongue is midline  LUNGS:  Breathing easily on room air  SKIN:  No acute rashes.    NEUROLOGIC:  Grossly nonfocal. Active x4 extremities         Laboratory Data:   Reviewed.  Pertinent for:    Laboratory Data:   Absolute CD4, [place] T Cells   Date Value Ref Range Status   06/12/2024 364 (L) 441 - 2,156 cells/uL Final     Microbiology:  Culture   Date  Value Ref Range Status   10/08/2022 No Growth  Final   10/08/2022 No Growth  Final     Metabolic Studies       Recent Labs   Lab Test 06/12/24  1343 06/07/24  1501 11/08/22  1416 10/08/22  1829 10/08/22  0447 10/07/22  0709 10/06/22  0536 10/05/22  1849    138 140  --  132* 134 131* 135   POTASSIUM 4.0 4.0 4.0  --  3.7 3.9 3.7 3.6   CHLORIDE 105 103 106  --  97 100 99 99   CO2 25 26 25  --  28 27 27 29   ANIONGAP 13 9 9  --  7 7 5 7   BUN 20.2* 15.0 16  --  7 11 13 14   CR 0.82 0.93 0.99  --  0.94 1.05 1.12 1.07   GFRESTIMATED >90 >90 >90  --  >90 88 81 86   * 94 101*  --  100* 106* 128* 144*   A1C  --  5.0  --   --   --   --   --   --    JUAN 9.1 9.5 9.7  --  9.0 8.5 7.9* 8.8   PHOS  --   --   --   --   --   --   --  2.1*   MAG  --   --   --   --  2.0 1.8 1.9 2.0   LACT  --   --   --  0.7  --   --   --   --      Hepatic Studies    Recent Labs   Lab Test 06/12/24  1343 06/07/24  1501 11/08/22  1416 10/08/22  0447 10/07/22  0709 10/06/22  0536   BILITOTAL 0.3 0.5 0.6 2.7* 2.4* 1.9*   ALKPHOS 62 59 60 54 51 57   ALBUMIN 4.3 4.3 3.8 2.3* 2.3* 2.6*   AST 50* 76* 30 41 50* 109*   ALT 59 89* 30 59 71* 120*     Pancreatitis testing    Recent Labs   Lab Test 06/12/24  1343 06/07/24  1501 11/08/22  1416 10/08/22  0447 10/07/22  0709 10/06/22  0536 10/05/22  1849 11/08/19  0000   AMYLASE 31  --   --   --   --   --   --   --    LIPASE 48 63* 90* 570* 661* 922* 1,486*  --    TRIG  --  267* 118  --   --   --   --  118     Hematology Studies      Recent Labs   Lab Test 06/12/24  1343 10/08/22  0447 10/07/22  0709 10/06/22  0536 10/05/22  1849   WBC 3.0* 5.9 6.8 6.8 8.4   HGB 14.5 12.5* 13.1* 14.2 16.7   HCT 39.8* 37.4* 38.6* 41.4 48.9    97* 102* 145* 196     Urine Studies     Recent Labs   Lab Test 06/12/24  1348 10/07/22  2344 10/05/22  1849   URINEPH 7.0 6.5 6.0   NITRITE Negative Negative Negative   LEUKEST Negative Negative Negative   WBCU <1 6* 7*         Latest Ref Rng & Units 6/12/2024     1:43 PM  6/7/2024     3:01 PM 11/8/2022     2:16 PM 10/8/2022     4:47 AM 10/7/2022     7:09 AM   Transplant Immunosuppression Labs   Creat 0.67 - 1.17 mg/dL 0.82  0.93  0.99  0.94  1.05    Urea Nitrogen 7 - 30 mg/dL   16  7  11    Urea Nitrogen 6.0 - 20.0 mg/dL 20.2  15.0       WBC 4.0 - 11.0 10e3/uL 3.0    5.9  6.8    Neutrophil % 37    67  71

## 2024-07-31 ENCOUNTER — OFFICE VISIT (OUTPATIENT)
Dept: PHARMACY | Facility: CLINIC | Age: 49
End: 2024-07-31
Payer: COMMERCIAL

## 2024-07-31 ENCOUNTER — OFFICE VISIT (OUTPATIENT)
Dept: INFECTIOUS DISEASES | Facility: CLINIC | Age: 49
End: 2024-07-31
Attending: STUDENT IN AN ORGANIZED HEALTH CARE EDUCATION/TRAINING PROGRAM
Payer: COMMERCIAL

## 2024-07-31 ENCOUNTER — LAB (OUTPATIENT)
Dept: LAB | Facility: CLINIC | Age: 49
End: 2024-07-31
Payer: COMMERCIAL

## 2024-07-31 VITALS
HEIGHT: 72 IN | WEIGHT: 222 LBS | TEMPERATURE: 97.6 F | SYSTOLIC BLOOD PRESSURE: 129 MMHG | HEART RATE: 86 BPM | BODY MASS INDEX: 30.07 KG/M2 | DIASTOLIC BLOOD PRESSURE: 90 MMHG

## 2024-07-31 DIAGNOSIS — Z21 HIV ANTIBODY POSITIVE (H): ICD-10-CM

## 2024-07-31 DIAGNOSIS — G47.00 INSOMNIA, UNSPECIFIED TYPE: ICD-10-CM

## 2024-07-31 DIAGNOSIS — B20 HUMAN IMMUNODEFICIENCY VIRUS (HIV) DISEASE (H): Primary | ICD-10-CM

## 2024-07-31 DIAGNOSIS — F41.9 ANXIETY: ICD-10-CM

## 2024-07-31 DIAGNOSIS — B20 HIV-1 INFECTION, SYMPTOMATIC (H): ICD-10-CM

## 2024-07-31 DIAGNOSIS — B20 HIV-1 INFECTION, SYMPTOMATIC (H): Primary | ICD-10-CM

## 2024-07-31 DIAGNOSIS — I10 ESSENTIAL HYPERTENSION: ICD-10-CM

## 2024-07-31 LAB
ANION GAP SERPL CALCULATED.3IONS-SCNC: 10 MMOL/L (ref 7–15)
BASOPHILS # BLD AUTO: 0 10E3/UL (ref 0–0.2)
BASOPHILS NFR BLD AUTO: 0 %
BUN SERPL-MCNC: 16.5 MG/DL (ref 6–20)
CALCIUM SERPL-MCNC: 10 MG/DL (ref 8.8–10.4)
CHLORIDE SERPL-SCNC: 101 MMOL/L (ref 98–107)
CREAT SERPL-MCNC: 1.05 MG/DL (ref 0.67–1.17)
EGFRCR SERPLBLD CKD-EPI 2021: 86 ML/MIN/1.73M2
EOSINOPHIL # BLD AUTO: 0 10E3/UL (ref 0–0.7)
EOSINOPHIL NFR BLD AUTO: 1 %
ERYTHROCYTE [DISTWIDTH] IN BLOOD BY AUTOMATED COUNT: 13.7 % (ref 10–15)
GLUCOSE SERPL-MCNC: 114 MG/DL (ref 70–99)
HCO3 SERPL-SCNC: 27 MMOL/L (ref 22–29)
HCT VFR BLD AUTO: 45.2 % (ref 40–53)
HGB BLD-MCNC: 15.7 G/DL (ref 13.3–17.7)
IMM GRANULOCYTES # BLD: 0 10E3/UL
IMM GRANULOCYTES NFR BLD: 0 %
LYMPHOCYTES # BLD AUTO: 1.3 10E3/UL (ref 0.8–5.3)
LYMPHOCYTES NFR BLD AUTO: 36 %
MCH RBC QN AUTO: 32.9 PG (ref 26.5–33)
MCHC RBC AUTO-ENTMCNC: 34.7 G/DL (ref 31.5–36.5)
MCV RBC AUTO: 95 FL (ref 78–100)
MONOCYTES # BLD AUTO: 0.4 10E3/UL (ref 0–1.3)
MONOCYTES NFR BLD AUTO: 10 %
NEUTROPHILS # BLD AUTO: 2 10E3/UL (ref 1.6–8.3)
NEUTROPHILS NFR BLD AUTO: 53 %
NRBC # BLD AUTO: 0 10E3/UL
NRBC BLD AUTO-RTO: 0 /100
PLATELET # BLD AUTO: 199 10E3/UL (ref 150–450)
POTASSIUM SERPL-SCNC: 4.4 MMOL/L (ref 3.4–5.3)
RBC # BLD AUTO: 4.77 10E6/UL (ref 4.4–5.9)
SODIUM SERPL-SCNC: 138 MMOL/L (ref 135–145)
WBC # BLD AUTO: 3.7 10E3/UL (ref 4–11)

## 2024-07-31 PROCEDURE — 99207 PR NO CHARGE LOS: CPT | Performed by: PHARMACIST

## 2024-07-31 PROCEDURE — 90750 HZV VACC RECOMBINANT IM: CPT | Performed by: STUDENT IN AN ORGANIZED HEALTH CARE EDUCATION/TRAINING PROGRAM

## 2024-07-31 PROCEDURE — 36415 COLL VENOUS BLD VENIPUNCTURE: CPT | Performed by: PATHOLOGY

## 2024-07-31 PROCEDURE — 80048 BASIC METABOLIC PNL TOTAL CA: CPT | Performed by: PATHOLOGY

## 2024-07-31 PROCEDURE — 90677 PCV20 VACCINE IM: CPT | Performed by: STUDENT IN AN ORGANIZED HEALTH CARE EDUCATION/TRAINING PROGRAM

## 2024-07-31 PROCEDURE — 250N000011 HC RX IP 250 OP 636: Performed by: STUDENT IN AN ORGANIZED HEALTH CARE EDUCATION/TRAINING PROGRAM

## 2024-07-31 PROCEDURE — 87536 HIV-1 QUANT&REVRSE TRNSCRPJ: CPT | Performed by: STUDENT IN AN ORGANIZED HEALTH CARE EDUCATION/TRAINING PROGRAM

## 2024-07-31 PROCEDURE — 250N000021 HC RX MED A9270 GY (STAT IND- M) 250: Performed by: STUDENT IN AN ORGANIZED HEALTH CARE EDUCATION/TRAINING PROGRAM

## 2024-07-31 PROCEDURE — 99215 OFFICE O/P EST HI 40 MIN: CPT | Performed by: STUDENT IN AN ORGANIZED HEALTH CARE EDUCATION/TRAINING PROGRAM

## 2024-07-31 PROCEDURE — 90472 IMMUNIZATION ADMIN EACH ADD: CPT | Performed by: STUDENT IN AN ORGANIZED HEALTH CARE EDUCATION/TRAINING PROGRAM

## 2024-07-31 PROCEDURE — G0009 ADMIN PNEUMOCOCCAL VACCINE: HCPCS | Performed by: STUDENT IN AN ORGANIZED HEALTH CARE EDUCATION/TRAINING PROGRAM

## 2024-07-31 PROCEDURE — 99213 OFFICE O/P EST LOW 20 MIN: CPT | Mod: 25 | Performed by: STUDENT IN AN ORGANIZED HEALTH CARE EDUCATION/TRAINING PROGRAM

## 2024-07-31 PROCEDURE — 85025 COMPLETE CBC W/AUTO DIFF WBC: CPT | Performed by: PATHOLOGY

## 2024-07-31 RX ADMIN — ZOSTER VACCINE RECOMBINANT, ADJUVANTED 0.5 ML: KIT at 13:27

## 2024-07-31 RX ADMIN — PNEUMOCOCCAL 20-VALENT CONJUGATE VACCINE 0.5 ML
2.2; 2.2; 2.2; 2.2; 2.2; 2.2; 2.2; 2.2; 2.2; 2.2; 2.2; 2.2; 2.2; 2.2; 2.2; 2.2; 4.4; 2.2; 2.2; 2.2 INJECTION, SUSPENSION INTRAMUSCULAR at 13:25

## 2024-07-31 ASSESSMENT — PAIN SCALES - GENERAL: PAINLEVEL: MILD PAIN (2)

## 2024-07-31 NOTE — PATIENT INSTRUCTIONS
"Recommendations from today's MTM visit:                                                      Bon Young,     A few things to consider:   Sertraline has a 20% risk for causing insomnia. Biktarvy has as 2% risk for causing insomnia. The risk for insomnia is lower with a different selective serotonin reuptake inhibitor such as escitalopram. It works the same way as sertraline. It may be helpful to switch from sertraline to escitalopram 10 mg per day to see if you sleep better. If you're interested in this change, I can talk to your current primary care provider. Otherwise, it's also reasonable to wait until you see your new primary care provider in a few weeks.     The blood pressure medication I'm thinking is a combination tablet of losartan/hydrochlorothiazide 50/12.5 mg once daily. Please check your blood pressure at home and bring to your next primary care provider appointment.     Thanks!    It was great speaking with you today.  I value your experience and would be very thankful for your time in providing feedback in our clinic survey. In the next few days, you may receive an email or text message from [facility] with a link to a survey related to your  clinical pharmacist.\"     To schedule another MTM appointment, please call the clinic directly or you may call the MTM scheduling line at [phone] or toll-free at [phone].     My Clinical Pharmacist's contact information:                                                      Please feel free to contact me with any questions or concerns you have.      Miya Coelho, PharmD, AAHIVP  Medication Therapy Management Pharmacist     "

## 2024-07-31 NOTE — LETTER
7/31/2024       RE: Hector Ramirez  [address]  [facility] [address]     Dear Colleague,    Thank you for referring your patient, Hector Ramirez, to the [facility] INFECTI[facility] DISE[facility] CLINIC [place] [facility]. Please see a copy of my visit note below.     [facility]  Dr. Ho Angeles, [facility], Floor 3  [address] [place] [address]   Patient:  Hector Ramirez, Date of birth 1975, Medical record number [id number]  Date of Visit:  07/30/2024         Assessment and Recommendations:   ID Problem List  Asymptomatic HIV  Latest HIV RNA (6/12/24) - 442,000  Latest CD4 (6/12/24) - 364  HLAB*57:01 negative (6/18/24)  Phenotype - unable to be run per [facility]  Tocoplasma IgG (6/12/24) - negative  CMV IgG (6/12/24) - 1.9  HAV Ab+  HBcAb+, HBsAb+, HBsAg- (immune due to natural infection)  HCV Ab-    Recommendations  Continue Biktarvy  HIV RNA ordered for today  Due for several vaccinations:  PCV20 x 1 dose today  Shingrix dose # 1 of 2 today - next dose in 2-6 months  Meningococcus - needs quadrivalent conjugated vaccine x 2 doses 8 weeks apart  Mpox - needs 2 doses 4 weeks apart  RTC in 1-2 months    Discussion  48yo M with h/o HTN, DIANNE, insomnia, HLD, and recent HIV diagnosis here for follow-up after initial visit with Dr. Ayala on 6/12/24.    Has been having some ongoing issues with bloating, abdominal discomfort, and insomnia - these had preceded Biktarvy, but seem to have been exacerbated a bit by this medication and the stress of his new diagnosis. Both pt and I discussed this with our [facility] pharmacist, and the plan will be to continue Biktarvy for now with the hopes that his viral load will drop sharply and allow for a transition to oral cabotegravir/rilpivirine, followed by transition to the injectable form once we're sure he tolerates the oral formulation.    Aside from this, pt received  PCV20 and shingrix dose #1 today. Will need dose #2 of shingrix in 2-6 months, and will also need to start Mpox and Meningococcus series at our next visit in 1-2 months.    Ho Angeles MD  Division of Infectious Diseases and International Medicine  P: [phone]    I spent at least 40 minutes on the day of this encounter on chart review, patient exam/interview, and note preparation.         History of Infectious Disease Illness:     48yo M with h/o HTN, DIANNE, insomnia, HLD, and recent HIV diagnosis here for follow-up after initial visit with Dr. Ayala on 6/12/24.    Pt was started on Biktarvy at his initial visit a bit over a month ago and has been tolerating it well. Says he has missed 1-2 doses. At that visit, he described having had sex with two individuals in [place] in February/March 2024 and then felt ill for 2 weeks with sore throat, body aches, and generalized fatigue. No injection drug use. He had been taking PrEP for awhile, but stopped in early 2024 when he ran out. Notes a history of MANDIE and cleared hepatitis B (Both remote).     Travel history includes trips to [place], [place], and [place]. No known TB exposure. Never experienced homelessness or incarceration.     Pharmacy: [facility]        Past Medical and Surgical History:   PMHx: HTN, DIANNE, insomnia, HLD, EtOH abuse (improving), prior EtOH pancreatitis  PSHx: None on file        Family History:   Reviewed and non-contributory        Social History:     Social History     Tobacco Use     Smoking status: Never     Smokeless tobacco: Never   Vaping Use     Vaping status: Never Used   Substance Use Topics     Alcohol use: Yes     Drug use: Never     Social History     Social History Narrative     Not on file            Review of Systems:   10-system ROS was performed and negative unless otherwise stated above.         Current Medications:     Current Outpatient Medications   Medication Sig Dispense Refill      bictegravir-emtricitabine-tenofovir (BIKTARVY) -25 MG per tablet Take 1 tablet by mouth daily 90 tablet 1     hydroCHLOROthiazide 12.5 MG tablet Take 1 tablet (12.5 mg) by mouth daily 90 tablet 2     losartan (COZAAR) 50 MG tablet Take 1 tablet (50 mg) by mouth daily 90 tablet 0     metoprolol succinate ER (TOPROL XL) 50 MG 24 hr tablet Take 1 tablet (50 mg) by mouth at bedtime 90 tablet 0     nortriptyline (PAMELOR) 10 MG capsule Take 1 capsule (10 mg) by mouth at bedtime 90 capsule 0     ondansetron (ZOFRAN) 4 MG tablet Take 1 tablet (4 mg) by mouth every 8 hours as needed for nausea 20 tablet 1     sertraline (ZOLOFT) 50 MG tablet Take 1 tablet (50 mg) by mouth daily 90 tablet 1            Immunization History:     Immunization History   Administered Date(s) Administered     COVID-19 12+ (2023-24) (Pfizer) 10/16/2023     COVID-19 Bivalent 12+ (Pfizer) 09/20/2022     COVID-19 MONOVALENT 12+ (Pfizer) 03/29/2021, 04/22/2021, 10/27/2021     DTaP, Unspecified 06/02/2006     Flu, Unspecified 12/10/2010     HepA-Peds, Unspecified 12/28/1999, 07/13/2000     Influenza (IIV3) PF 11/11/2014     Influenza Vaccine >6 months,quad, PF 11/02/2015, 11/15/2016, 11/09/2018, 11/08/2019, 10/20/2020, 09/28/2021     Influenza Vaccine IM Ages 6-35 Months 4 Valent (PF) 09/20/2022     Influenza,INJ,MDCK,PF,Quad >6mo(Flucelvax) 10/16/2023     TDAP Vaccine (Adacel) 06/02/2006     Td (Adult), Adsorbed 12/30/1998, 11/09/2018     Tdap (Adult) Unspecified Formulation 11/09/2018            Allergies:   No Known Allergies         Physical Exam:   Vital signs:  There were no vitals taken for this visit.    Physical Examination:  GENERAL:  well-developed, well-nourished, seated in no acute distress.  HEENT:  Head is normocephalic, atraumatic   EYES:  Eyes have anicteric sclerae without conjunctival injection   ENT:  Oropharynx is moist without exudates or ulcers. Tongue is midline  LUNGS:  Breathing easily on room air  SKIN:  No acute rashes.     NEUROLOGIC:  Grossly nonfocal. Active x4 extremities         Laboratory Data:   Reviewed.  Pertinent for:    Laboratory Data:   Absolute CD4, [place] T Cells   Date Value Ref Range Status   06/12/2024 364 (L) 441 - 2,156 cells/uL Final     Microbiology:  Culture   Date Value Ref Range Status   10/08/2022 No Growth  Final   10/08/2022 No Growth  Final     Metabolic Studies       Recent Labs   Lab Test 06/12/24  1343 06/07/24  1501 11/08/22  1416 10/08/22  1829 10/08/22  0447 10/07/22  0709 10/06/22  0536 10/05/22  1849    138 140  --  132* 134 131* 135   POTASSIUM 4.0 4.0 4.0  --  3.7 3.9 3.7 3.6   CHLORIDE 105 103 106  --  97 100 99 99   CO2 25 26 25  --  28 27 27 29   ANIONGAP 13 9 9  --  7 7 5 7   BUN 20.2* 15.0 16  --  7 11 13 14   CR 0.82 0.93 0.99  --  0.94 1.05 1.12 1.07   GFRESTIMATED >90 >90 >90  --  >90 88 81 86   * 94 101*  --  100* 106* 128* 144*   A1C  --  5.0  --   --   --   --   --   --    JUAN 9.1 9.5 9.7  --  9.0 8.5 7.9* 8.8   PHOS  --   --   --   --   --   --   --  2.1*   MAG  --   --   --   --  2.0 1.8 1.9 2.0   LACT  --   --   --  0.7  --   --   --   --      Hepatic Studies    Recent Labs   Lab Test 06/12/24  1343 06/07/24  1501 11/08/22  1416 10/08/22  0447 10/07/22  0709 10/06/22  0536   BILITOTAL 0.3 0.5 0.6 2.7* 2.4* 1.9*   ALKPHOS 62 59 60 54 51 57   ALBUMIN 4.3 4.3 3.8 2.3* 2.3* 2.6*   AST 50* 76* 30 41 50* 109*   ALT 59 89* 30 59 71* 120*     Pancreatitis testing    Recent Labs   Lab Test 06/12/24  1343 06/07/24  1501 11/08/22  1416 10/08/22  0447 10/07/22  0709 10/06/22  0536 10/05/22  1849 11/08/19  0000   AMYLASE 31  --   --   --   --   --   --   --    LIPASE 48 63* 90* 570* 661* 922* 1,486*  --    TRIG  --  267* 118  --   --   --   --  118     Hematology Studies      Recent Labs   Lab Test 06/12/24  1343 10/08/22  0447 10/07/22  0709 10/06/22  0536 10/05/22  1849   WBC 3.0* 5.9 6.8 6.8 8.4   HGB 14.5 12.5* 13.1* 14.2 16.7   HCT 39.8* 37.4* 38.6* 41.4 48.9    97*  102* 145* 196     Urine Studies     Recent Labs   Lab Test 06/12/24  1348 10/07/22  2344 10/05/22  1849   URINEPH 7.0 6.5 6.0   NITRITE Negative Negative Negative   LEUKEST Negative Negative Negative   WBCU <1 6* 7*         Latest Ref Rng & Units 6/12/2024     1:43 PM 6/7/2024     3:01 PM 11/8/2022     2:16 PM 10/8/2022     4:47 AM 10/7/2022     7:09 AM   Transplant Immunosuppression Labs   Creat 0.67 - 1.17 mg/dL 0.82  0.93  0.99  0.94  1.05    Urea Nitrogen 7 - 30 mg/dL   16  7  11    Urea Nitrogen 6.0 - 20.0 mg/dL 20.2  15.0       WBC 4.0 - 11.0 10e3/uL 3.0    5.9  6.8    Neutrophil % 37    67  71          Again, thank you for allowing me to participate in the care of your patient.      Sincerely,    Ho Angeles MD

## 2024-07-31 NOTE — Clinical Note
FYI - patient establishing care with you on 8/21. I think sertraline could be contributing to insomnia. Could try switching to lexapro. Let me know if you need any assistance! Miya Coelho, PharmD, AAS[facility] [place] Medication Therapy Management Pharmacist

## 2024-07-31 NOTE — PROGRESS NOTES
Disease State Management Encounter:                          Hector Ramirez is a 49 year old male coming in for an initial visit. He was referred to me from Dr. Ayala.     Reason for visit: new antiretroviral therapy start. Establishing with new primary care provider soon.    HIV:   Biktarvy once daily at night  After starting Biktarvy, had a lot of GI side effects like nausea, stomach bloating and diarrhea. He's also been experiencing trouble with sleep, short term memory problems, and dry mouth. Takes omeprazole for acid reflux symptoms sometimes which helps. Ondansetron is helpful for nausea.  Diagnosis: new diagnosis 6/2024  Past regimens: none  Phenotype: ordered but not completed yet  HIV VL: 442,000 copies on 6/12/24  CD4: 364 on 6/12/24  Immunizations: up to date on covid, flu, tdap. Immune to hepA. Immune to hepB. HepB core ab is positive so likely immune through natural infection     Anxiety/insomnia:   Sertraline 25 mg 2 times daily     Sleeping 5-6 hours at night. Sometimes has issues falling asleep but more trouble staying asleep. Sometime wakes up with racing thoughts. Sleep has been worse since starting Biktarvy. He prefers to take Biktarvy at night to make GI side effects more tolerable.  He increased the sertraline dose around the same time as starting Biktarvy and prefers to take 2 times daily to help it calm anxiety better throughout the day. Sertraline is effective for anxiety/mood. Has dry mouth. He stopped nortriptyline 1.5 weeks ago because it wasn't helping. Thinks his mind is more clear today.    Previous trials:   Zolpidem was helpful but primary care provider preferred for him not to take it long-term.  Trazodone not helpful    Hypertension:   Hydrochlorothiazide 12.5 mg once daily   Losartan 50 mg once daily   Metoprolol ER 50 mg once daily   Patient reports no current medication side effects.  Patient does not self-monitor blood pressure. He wonders if he's on too many blood pressure  medications. On metoprolol for history of tachycardia.     BP Readings from Last 3 Encounters:   07/31/24 (!) 129/90   06/12/24 (!) 147/96   06/07/24 127/89       Today's Vitals: There were no vitals taken for this visit.    Assessment/Plan:    HIV:   Rechecking labs today. Interested in switching to Cabenuva once HIV RNA <50 copies. Can check Cabenuva coverage at that time. Will need to provide full Cabenuva education if he wants to switch over. Could also try famotidine for as needed acid reflux since it may work faster than famotidine.    Anxiety/insomnia:   Biktarvy has a 2% risk for insomnia. Sertraline has a 20% risk for insomnia. Taking half the sertraline dose at night is likely worsening sleep. May benefit from switching to a different selective serotonin reuptake inhibitor with decreased risk for insomnia such as escitalopram. Dry mouth likely from sertraline and nortriptyline. Nortriptyline can cause CNS depression which could impact cognition- monitor for improvement. Infectious disease provider referred to therapist.     Hypertension:   Blood pressure not at goal <140/90. Recommend checking blood pressure at home. Could switch to a combination losartan/hctz tablet to reduce pill burden if needed. Discussed that blood pressure meds work better synergistically. Metoprolol may still be beneficial for tachycardia.HR sometimes in 100s in clinic.    Hold off on new med change until he establishes with new primary care provider.    Follow-up: as needed    I spent 10 minutes with this patient today. All changes were made via collaborative practice agreement with Dr. Ayala. A copy of the visit note was provided to the patient's provider(s).    A summary of these recommendations was sent via [employer].       Medication Therapy Recommendations  Essential hypertension    Current Medication: losartan (COZAAR) 50 MG tablet   Rationale: Patient prefers not to take - Adherence - Adherence   Recommendation: Change  Medication - LOSARTAN-HYDROCHLOROTHIAZIDE 50-12.5 MG PO (COMBO)   Status: Contact Provider - Awaiting Response         Insomnia    Current Medication: sertraline (ZOLOFT) 50 MG tablet   Rationale: Undesirable effect - Adverse medication event - Safety   Recommendation: Change Medication - escitalopram 10 MG tablet   Status: Contact Provider - Awaiting Response

## 2024-07-31 NOTE — NURSING NOTE
Chief Complaint   Patient presents with    Follow Up     B20     BP (!) 129/90   Pulse 86   Temp 97.6  F (36.4  C) (Oral)   Ht 1.829 m (6')   Wt 100.7 kg (222 lb)   BMI 30.11 kg/m    Barbie Akers MA on 7/31/2024 at 12:23 PM

## 2024-08-01 LAB
HIV1 RNA # PLAS NAA DL=20: 87 COPIES/ML
HIV1 RNA SERPL NAA+PROBE-LOG#: 1.9 {LOG_COPIES}/ML

## 2024-08-02 ENCOUNTER — TELEPHONE (OUTPATIENT)
Dept: INFECTIOUS DISEASES | Facility: CLINIC | Age: 49
End: 2024-08-02
Payer: COMMERCIAL

## 2024-08-02 DIAGNOSIS — G47.00 INSOMNIA, UNSPECIFIED TYPE: Primary | ICD-10-CM

## 2024-08-02 RX ORDER — ZOLPIDEM TARTRATE 10 MG/1
10 TABLET ORAL
Status: SHIPPED
Start: 2024-08-02 | End: 2024-08-29

## 2024-08-02 ASSESSMENT — ANXIETY QUESTIONNAIRES
7. FEELING AFRAID AS IF SOMETHING AWFUL MIGHT HAPPEN: MORE THAN HALF THE DAYS
1. FEELING NERVOUS, ANXIOUS, OR ON EDGE: MORE THAN HALF THE DAYS
7. FEELING AFRAID AS IF SOMETHING AWFUL MIGHT HAPPEN: MORE THAN HALF THE DAYS
8. IF YOU CHECKED OFF ANY PROBLEMS, HOW DIFFICULT HAVE THESE MADE IT FOR YOU TO DO YOUR WORK, TAKE CARE OF THINGS AT HOME, OR GET ALONG WITH OTHER PEOPLE?: VERY DIFFICULT
5. BEING SO RESTLESS THAT IT IS HARD TO SIT STILL: SEVERAL DAYS
6. BECOMING EASILY ANNOYED OR IRRITABLE: SEVERAL DAYS
GAD7 TOTAL SCORE: 9
2. NOT BEING ABLE TO STOP OR CONTROL WORRYING: SEVERAL DAYS
GAD7 TOTAL SCORE: 9
GAD7 TOTAL SCORE: 9
4. TROUBLE RELAXING: SEVERAL DAYS
IF YOU CHECKED OFF ANY PROBLEMS ON THIS QUESTIONNAIRE, HOW DIFFICULT HAVE THESE PROBLEMS MADE IT FOR YOU TO DO YOUR WORK, TAKE CARE OF THINGS AT HOME, OR GET ALONG WITH OTHER PEOPLE: VERY DIFFICULT
3. WORRYING TOO MUCH ABOUT DIFFERENT THINGS: SEVERAL DAYS

## 2024-08-02 NOTE — TELEPHONE ENCOUNTER
----- Message from Ho Angeles sent at 8/2/2024  1:44 PM CDT -----  Regarding: Rx Call-in  Xiomara Braun,    Thanks for your help with this! OK to call in a prescription for zolpidem 10mg PRN for sleep at night x 14 doses for Hector Ramirez.    Thanks,  Avni

## 2024-08-02 NOTE — TELEPHONE ENCOUNTER
Called pharmacy and left a message with full prescribing information and providers MARCEL due to pharmacy staff being on a lunch break.     Signed Prescriptions:                        Disp   Refills    zolpidem (AMBIEN) 10 MG tablet                             Sig: Take 1 tablet (10 mg) by mouth nightly as needed for           sleep  Authorizing Provider: CITLALI YEE  Ordering User: SU MACIAS, RN  Infectious Disease on 8/2/2024 at 1:52 PM

## 2024-08-05 DIAGNOSIS — B20 HIV-1 INFECTION, SYMPTOMATIC (H): Primary | ICD-10-CM

## 2024-08-05 NOTE — PROGRESS NOTES
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  [phone] (children)      [facility] Mobile Crisis  [phone]      [facility] Mobile Crisis  [phone]    Community Resources      Fast Tracker  Linking people to mental health and substance use disorder resources  fasttrackermn.org        National [employer] Mental Illness (MIKE)  766.638.1584 or 1.888.MIKE.HELPS  https://namimn.org/        [facility] Urgent Care for Adult Mental Health  [facility] residents only   [address] [facility] [place] [facility]  [phone]        [facility]  Free mental health counseling  https://walkin.org/  [phone] X2    Mental Health Apps      Calm Harm  https://calmharm.co.uk/      My3  https://my3app.org/      Judy Safety Plan  https://www.mysafetyplan.org/

## 2024-08-06 ENCOUNTER — VIRTUAL VISIT (OUTPATIENT)
Dept: BEHAVIORAL HEALTH | Facility: CLINIC | Age: 49
End: 2024-08-06
Attending: STUDENT IN AN ORGANIZED HEALTH CARE EDUCATION/TRAINING PROGRAM
Payer: COMMERCIAL

## 2024-08-06 DIAGNOSIS — B20 HIV-1 INFECTION, SYMPTOMATIC (H): ICD-10-CM

## 2024-08-06 DIAGNOSIS — F33.1 DEPRESSION, MAJOR, RECURRENT, MODERATE (H): Primary | ICD-10-CM

## 2024-08-06 PROCEDURE — 90791 PSYCH DIAGNOSTIC EVALUATION: CPT | Mod: 95 | Performed by: SOCIAL WORKER

## 2024-08-06 ASSESSMENT — PATIENT HEALTH QUESTIONNAIRE - PHQ9
10. IF YOU CHECKED OFF ANY PROBLEMS, HOW DIFFICULT HAVE THESE PROBLEMS MADE IT FOR YOU TO DO YOUR WORK, TAKE CARE OF THINGS AT HOME, OR GET ALONG WITH OTHER PEOPLE: VERY DIFFICULT
SUM OF ALL RESPONSES TO PHQ QUESTIONS 1-9: 11
SUM OF ALL RESPONSES TO PHQ QUESTIONS 1-9: 11

## 2024-08-06 ASSESSMENT — COLUMBIA-SUICIDE SEVERITY RATING SCALE - C-SSRS
6. HAVE YOU EVER DONE ANYTHING, STARTED TO DO ANYTHING, OR PREPARED TO DO ANYTHING TO END YOUR LIFE?: NO
TOTAL  NUMBER OF INTERRUPTED ATTEMPTS LIFETIME: NO
REASONS FOR IDEATION PAST MONTH: COMPLETELY TO END OR STOP THE PAIN (YOU COULDN'T GO ON LIVING WITH THE PAIN OR HOW YOU WERE FEELING)
1. HAVE YOU WISHED YOU WERE DEAD OR WISHED YOU COULD GO TO SLEEP AND NOT WAKE UP?: YES
2. HAVE YOU ACTUALLY HAD ANY THOUGHTS OF KILLING YOURSELF?: NO
REASONS FOR IDEATION LIFETIME: COMPLETELY TO END OR STOP THE PAIN (YOU COULDN'T GO ON LIVING WITH THE PAIN OR HOW YOU WERE FEELING)
1. IN THE PAST MONTH, HAVE YOU WISHED YOU WERE DEAD OR WISHED YOU COULD GO TO SLEEP AND NOT WAKE UP?: YES
TOTAL  NUMBER OF ABORTED OR SELF INTERRUPTED ATTEMPTS LIFETIME: NO
ATTEMPT LIFETIME: NO

## 2024-08-13 ENCOUNTER — TELEPHONE (OUTPATIENT)
Dept: INFECTIOUS DISEASES | Facility: CLINIC | Age: 49
End: 2024-08-13
Payer: COMMERCIAL

## 2024-08-13 NOTE — TELEPHONE ENCOUNTER
Patient called and left  on RN's phone. Patient states that paperwork did not get to where it needed to go for [facility].    Paperwork for Short Term Disability sent to [place] via Fax to [phone].      Kade Juárez RN  Infectious Disease on 8/13/2024 at 11:16 AM

## 2024-08-28 DIAGNOSIS — G47.00 INSOMNIA, UNSPECIFIED TYPE: ICD-10-CM

## 2024-09-03 ENCOUNTER — LAB (OUTPATIENT)
Dept: LAB | Facility: CLINIC | Age: 49
End: 2024-09-03
Attending: STUDENT IN AN ORGANIZED HEALTH CARE EDUCATION/TRAINING PROGRAM
Payer: COMMERCIAL

## 2024-09-03 DIAGNOSIS — B20 HIV-1 INFECTION, SYMPTOMATIC (H): ICD-10-CM

## 2024-09-03 PROCEDURE — 87536 HIV-1 QUANT&REVRSE TRNSCRPJ: CPT

## 2024-09-03 PROCEDURE — 36415 COLL VENOUS BLD VENIPUNCTURE: CPT

## 2024-09-05 LAB
HIV1 RNA # PLAS NAA DL=20: 410 COPIES/ML
HIV1 RNA SERPL NAA+PROBE-LOG#: 2.6 {LOG_COPIES}/ML

## 2024-09-06 ENCOUNTER — TELEPHONE (OUTPATIENT)
Dept: INFECTIOUS DISEASES | Facility: CLINIC | Age: 49
End: 2024-09-06
Payer: COMMERCIAL

## 2024-09-10 ASSESSMENT — ANXIETY QUESTIONNAIRES
1. FEELING NERVOUS, ANXIOUS, OR ON EDGE: SEVERAL DAYS
6. BECOMING EASILY ANNOYED OR IRRITABLE: NOT AT ALL
5. BEING SO RESTLESS THAT IT IS HARD TO SIT STILL: NOT AT ALL
7. FEELING AFRAID AS IF SOMETHING AWFUL MIGHT HAPPEN: MORE THAN HALF THE DAYS
IF YOU CHECKED OFF ANY PROBLEMS ON THIS QUESTIONNAIRE, HOW DIFFICULT HAVE THESE PROBLEMS MADE IT FOR YOU TO DO YOUR WORK, TAKE CARE OF THINGS AT HOME, OR GET ALONG WITH OTHER PEOPLE: SOMEWHAT DIFFICULT
GAD7 TOTAL SCORE: 6
4. TROUBLE RELAXING: SEVERAL DAYS
2. NOT BEING ABLE TO STOP OR CONTROL WORRYING: SEVERAL DAYS
3. WORRYING TOO MUCH ABOUT DIFFERENT THINGS: SEVERAL DAYS

## 2024-09-10 NOTE — PROGRESS NOTES
[facility] Infectious Disease Clinic  Dr. Ho Angeles, Clinics [facility], Floor [address]  [address] [place] [address]   Patient:  Hector Ramirez, Date of birth 1975, Medical record number [id number]  Date of Visit:  09/11/2024         Assessment and Recommendations:   ID Problem List  Asymptomatic HIV  Latest HIV RNA (9/3/24) - 410 (up from 87 on 7/31/24)  Latest CD4 (6/12/24) - 364  HLAB*57:01 negative (6/18/24)  Toxoplasma IgG (6/12/24) - negative  CMV IgG (6/12/24) - 1.9  HAV Ab+  HBcAb+, HBsAb+, HBsAg- (immune due to natural infection)  HCV Ab-    Recommendations  Continue Biktarvy  HIV RNA in one month  Due for several vaccinations:  Shingrix dose # 2 of 2 in 1 month  Meningococcus - needs quadrivalent conjugated vaccine x 2 doses 8 weeks apart  Mpox - needs 2 doses 4 weeks apart  RTC in 3 months    Discussion  48yo M with h/o HTN, DIANNE, insomnia, HLD, and recent HIV diagnosis here for routine HIV follow-up.    Small uptick in viral load - says he has missed 3-4 doses in the past month, unlikely to explain this. Counseled on adherence, plan to recheck HIV RNA in 3-4 weeks and, if remains elevated, will consider new genotype (last one had collection/processing issue?) and possibly adjust ART.    Will need dose #2 of shingrix in 1 month, and will also need to start Mpox and Meningococcus series.    Ho Angeles MD  Division of Infectious Diseases and International Medicine  P: [phone]    I spent at least 40 minutes on the day of this encounter on chart review, patient exam/interview, and note preparation.         History of Infectious Disease Illness:     48yo M with h/o HTN, DIANNE, insomnia, HLD, and recent HIV diagnosis here for follow-up after initial visit with Dr. Ayala on 6/12/24.    Pt was started on Biktarvy at his initial visit and has been tolerating it well. Says he has missed 1-2 doses. At that visit, he described having had sex with two individuals in [place] in  February/March 2024 and then felt ill for 2 weeks with sore throat, body aches, and generalized fatigue. No injection drug use. He had been taking PrEP for awhile, but stopped in early 2024 when he ran out. Notes a history of MANDIE and cleared hepatitis B (both remote).     Travel history includes trips to [place], [place], and [place]. No known TB exposure. Never experienced homelessness or incarceration.     [facility]acy: [facility]        Past Medical and Surgical History:   PMHx: HTN, DIANNE, insomnia, HLD, EtOH abuse (improving), prior EtOH pancreatitis  PSHx: None on file        Family History:   Reviewed and non-contributory        Social History:     Social History     Tobacco Use    Smoking status: Never    Smokeless tobacco: Never   Vaping Use    Vaping status: Never Used   Substance Use Topics    Alcohol use: Yes    Drug use: Never     Social History     Social History Narrative    Not on file            Review of Systems:   10-system ROS was performed and negative unless otherwise stated above.         Current Medications:     Current Outpatient Medications   Medication Sig Dispense Refill    bictegravir-emtricitabine-tenofovir (BIKTARVY) -25 MG per tablet Take 1 tablet by mouth daily 90 tablet 2    hydroCHLOROthiazide 12.5 MG tablet Take 1 tablet (12.5 mg) by mouth daily 90 tablet 2    losartan (COZAAR) 50 MG tablet Take 1 tablet (50 mg) by mouth daily 90 tablet 0    metoprolol succinate ER (TOPROL XL) 50 MG 24 hr tablet Take 1 tablet (50 mg) by mouth at bedtime 90 tablet 0    nortriptyline (PAMELOR) 10 MG capsule Take 1 capsule (10 mg) by mouth at bedtime 90 capsule 0    omeprazole (PRILOSEC) 20 MG DR capsule Take 20 mg by mouth daily as needed      ondansetron (ZOFRAN) 4 MG tablet Take 1 tablet (4 mg) by mouth every 8 hours as needed for nausea 20 tablet 1    sertraline (ZOLOFT) 50 MG tablet Take 1 tablet (50 mg) by mouth daily 90 tablet 1    zolpidem (AMBIEN) 10 MG tablet Take 1  tablet (10 mg) by mouth nightly as needed for sleep.              Immunization History:     Immunization History   Administered Date(s) Administered    COVID-19 12+ (Pfizer) 10/16/2023    COVID-19 Bivalent 12+ (Pfizer) 09/20/2022    COVID-19 MONOVALENT 12+ (Pfizer) 03/29/2021, 04/22/2021, 10/27/2021    DTaP, Unspecified 06/02/2006    Flu, Unspecified 12/10/2010    HepA-Peds, Unspecified 12/28/1999, 07/13/2000    Influenza (IIV3) PF 11/11/2014    Influenza Vaccine >6 months,quad, PF 11/02/2015, 11/15/2016, 11/09/2018, 11/08/2019, 10/20/2020, 09/28/2021    Influenza Vaccine IM Ages 6-35 Months 4 Valent (PF) 09/20/2022    Influenza,INJ,MDCK,PF,Quad >6mo(Flucelvax) 10/16/2023    Pneumococcal 20 valent Conjugate (Prevnar 20) 07/31/2024    TDAP Vaccine (Adacel) 06/02/2006    Td (Adult), Adsorbed 12/30/1998, 11/09/2018    Tdap (Adult) Unspecified Formulation 11/09/2018    Zoster recombinant adjuvanted (SHINGRIX) 07/31/2024            Allergies:   No Known Allergies         Physical Exam:   Vital signs:  There were no vitals taken for this visit.    Physical Examination:  GENERAL:  well-developed, well-nourished, seated in no acute distress.  HEENT:  Head is normocephalic, atraumatic   EYES:  Eyes have anicteric sclerae without conjunctival injection   ENT:  Oropharynx is moist without exudates or ulcers. Tongue is midline  LUNGS:  Breathing easily on room air  SKIN:  No acute rashes.    NEUROLOGIC:  Grossly nonfocal. Active x4 extremities         Laboratory Data:   Reviewed.  Pertinent for:    Laboratory Data:   Absolute CD4, [place] T Cells   Date Value Ref Range Status   06/12/2024 364 (L) 441 - 2,156 cells/uL Final     Microbiology:  Culture   Date Value Ref Range Status   10/08/2022 No Growth  Final   10/08/2022 No Growth  Final     Metabolic Studies       Recent Labs   Lab Test 07/31/24  1348 06/12/24  1343 06/07/24  1501 11/08/22  1416 10/08/22  1829 10/08/22  0447 10/07/22  0709 10/06/22  0536 10/05/22  1849     143 138 140  --  132* 134   < > 135   POTASSIUM 4.4 4.0 4.0 4.0  --  3.7 3.9   < > 3.6   CHLORIDE 101 105 103 106  --  97 100   < > 99   CO2 27 25 26 25  --  28 27   < > 29   ANIONGAP 10 13 9 9  --  7 7   < > 7   BUN 16.5 20.2* 15.0 16  --  7 11   < > 14   CR 1.05 0.82 0.93 0.99  --  0.94 1.05   < > 1.07   GFRESTIMATED 86 >90 >90 >90  --  >90 88   < > 86   * 117* 94 101*  --  100* 106*   < > 144*   A1C  --   --  5.0  --   --   --   --   --   --    JUAN 10.0 9.1 9.5 9.7  --  9.0 8.5   < > 8.8   PHOS  --   --   --   --   --   --   --   --  2.1*   MAG  --   --   --   --   --  2.0 1.8   < > 2.0   LACT  --   --   --   --  0.7  --   --   --   --     < > = values in this interval not displayed.     Hepatic Studies    Recent Labs   Lab Test 06/12/24 1343 06/07/24  1501 11/08/22  1416 10/08/22  0447 10/07/22  0709 10/06/22  0536   BILITOTAL 0.3 0.5 0.6 2.7* 2.4* 1.9*   ALKPHOS 62 59 60 54 51 57   ALBUMIN 4.3 4.3 3.8 2.3* 2.3* 2.6*   AST 50* 76* 30 41 50* 109*   ALT 59 89* 30 59 71* 120*     Pancreatitis testing    Recent Labs   Lab Test 06/12/24  1343 06/07/24  1501 11/08/22  1416 10/08/22  0447 10/07/22  0709 10/06/22  0536 10/05/22  1849 11/08/19  0000   AMYLASE 31  --   --   --   --   --   --   --    LIPASE 48 63* 90* 570* 661* 922* 1,486*  --    TRIG  --  267* 118  --   --   --   --  118     Hematology Studies      Recent Labs   Lab Test 07/31/24  1348 06/12/24  1343 10/08/22  0447 10/07/22  0709 10/06/22  0536 10/05/22  1849   WBC 3.7* 3.0* 5.9 6.8 6.8 8.4   HGB 15.7 14.5 12.5* 13.1* 14.2 16.7   HCT 45.2 39.8* 37.4* 38.6* 41.4 48.9    177 97* 102* 145* 196     Urine Studies     Recent Labs   Lab Test 06/12/24  1348 10/07/22  2344 10/05/22  1849   URINEPH 7.0 6.5 6.0   NITRITE Negative Negative Negative   LEUKEST Negative Negative Negative   WBCU <1 6* 7*         Latest Ref Rng & Units 7/31/2024     1:48 PM 6/12/2024     1:43 PM 6/7/2024     3:01 PM 11/8/2022     2:16 PM 10/8/2022     4:47 AM   Transplant  Immunosuppression Labs   Creat 0.67 - 1.17 mg/dL 1.05  0.82  0.93  0.99  0.94    Urea Nitrogen 7 - 30 mg/dL    16  7    Urea Nitrogen 6.0 - 20.0 mg/dL 16.5  20.2  15.0      WBC 4.0 - 11.0 10e3/uL 3.7  3.0    5.9    Neutrophil % 53  37    67

## 2024-09-11 ENCOUNTER — OFFICE VISIT (OUTPATIENT)
Dept: INFECTIOUS DISEASES | Facility: CLINIC | Age: 49
End: 2024-09-11
Attending: STUDENT IN AN ORGANIZED HEALTH CARE EDUCATION/TRAINING PROGRAM
Payer: COMMERCIAL

## 2024-09-11 VITALS
SYSTOLIC BLOOD PRESSURE: 130 MMHG | HEIGHT: 72 IN | DIASTOLIC BLOOD PRESSURE: 90 MMHG | HEART RATE: 102 BPM | WEIGHT: 224 LBS | TEMPERATURE: 97.7 F | BODY MASS INDEX: 30.34 KG/M2

## 2024-09-11 DIAGNOSIS — Z21 ASYMPTOMATIC HUMAN IMMUNODEFICIENCY VIRUS (HIV) INFECTION STATUS (H): Primary | ICD-10-CM

## 2024-09-11 PROCEDURE — 99215 OFFICE O/P EST HI 40 MIN: CPT | Performed by: STUDENT IN AN ORGANIZED HEALTH CARE EDUCATION/TRAINING PROGRAM

## 2024-09-11 PROCEDURE — 99213 OFFICE O/P EST LOW 20 MIN: CPT | Performed by: STUDENT IN AN ORGANIZED HEALTH CARE EDUCATION/TRAINING PROGRAM

## 2024-09-11 ASSESSMENT — PAIN SCALES - GENERAL: PAINLEVEL: MILD PAIN (2)

## 2024-09-11 NOTE — LETTER
9/11/2024       RE: Hector Ramirez  [address]  [facility] [address]     Dear Colleague,    Thank you for referring your patient, Hector Ramirez, to the [facility] INFECTIOUS DISEASE CLINIC [place] at [facility]. Please see a copy of my visit note below.     [facility] Infectious Disease Clinic  Dr. Ho Angeles, [facility], Floor 3  [address] [place] [address]   Patient:  Hector Ramirez, Date of birth 1975, Medical record number [id number]  Date of Visit:  09/11/2024         Assessment and Recommendations:   ID Problem List  Asymptomatic HIV  Latest HIV RNA (9/3/24) - 410 (up from 87 on 7/31/24)  Latest CD4 (6/12/24) - 364  HLAB*57:01 negative (6/18/24)  Toxoplasma IgG (6/12/24) - negative  CMV IgG (6/12/24) - 1.9  HAV Ab+  HBcAb+, HBsAb+, HBsAg- (immune due to natural infection)  HCV Ab-    Recommendations  Continue Biktarvy  HIV RNA in one month  Due for several vaccinations:  Shingrix dose # 2 of 2 in 1 month  Meningococcus - needs quadrivalent conjugated vaccine x 2 doses 8 weeks apart  Mpox - needs 2 doses 4 weeks apart  RTC in 3 months    Discussion  48yo M with h/o HTN, DIANNE, insomnia, HLD, and recent HIV diagnosis here for routine HIV follow-up.    Small uptick in viral load - says he has missed 3-4 doses in the past month, unlikely to explain this. Counseled on adherence, plan to recheck HIV RNA in 3-4 weeks and, if remains elevated, will consider new genotype (last one had collection/processing issue?) and possibly adjust ART.    Will need dose #2 of shingrix in 1 month, and will also need to start Mpox and Meningococcus series.    Ho Angeles MD  Division of Infectious Diseases and International Medicine  P: [phone]    I spent at least 40 minutes on the day of this encounter on chart review, patient exam/interview, and note preparation.         History of Infectious Disease Illness:     48yo M  with h/o HTN, DIANNE, insomnia, HLD, and recent HIV diagnosis here for follow-up after initial visit with Dr. Ayala on 6/12/24.    Pt was started on Biktarvy at his initial visit and has been tolerating it well. Says he has missed 1-2 doses. At that visit, he described having had sex with two individuals in [place] in February/March 2024 and then felt ill for 2 weeks with sore throat, body aches, and generalized fatigue. No injection drug use. He had been taking PrEP for awhile, but stopped in early 2024 when he ran out. Notes a history of MANDIE and cleared hepatitis B (both remote).     Travel history includes trips to [place], [place], and [place]. No known TB exposure. Never experienced homelessness or incarceration.     [facility]: [facility]        Past Medical and Surgical History:   PMHx: HTN, DIANNE, insomnia, HLD, EtOH abuse (improving), prior EtOH pancreatitis  PSHx: None on file        Family History:   Reviewed and non-contributory        Social History:     Social History     Tobacco Use     Smoking status: Never     Smokeless tobacco: Never   Vaping Use     Vaping status: Never Used   Substance Use Topics     Alcohol use: Yes     Drug use: Never     Social History     Social History Narrative     Not on file            Review of Systems:   10-system ROS was performed and negative unless otherwise stated above.         Current Medications:     Current Outpatient Medications   Medication Sig Dispense Refill     bictegravir-emtricitabine-tenofovir (BIKTARVY) -25 MG per tablet Take 1 tablet by mouth daily 90 tablet 2     hydroCHLOROthiazide 12.5 MG tablet Take 1 tablet (12.5 mg) by mouth daily 90 tablet 2     losartan (COZAAR) 50 MG tablet Take 1 tablet (50 mg) by mouth daily 90 tablet 0     metoprolol succinate ER (TOPROL XL) 50 MG 24 hr tablet Take 1 tablet (50 mg) by mouth at bedtime 90 tablet 0     nortriptyline (PAMELOR) 10 MG capsule Take 1 capsule (10 mg) by mouth at bedtime 90  capsule 0     omeprazole (PRILOSEC) 20 MG DR capsule Take 20 mg by mouth daily as needed       ondansetron (ZOFRAN) 4 MG tablet Take 1 tablet (4 mg) by mouth every 8 hours as needed for nausea 20 tablet 1     sertraline (ZOLOFT) 50 MG tablet Take 1 tablet (50 mg) by mouth daily 90 tablet 1     zolpidem (AMBIEN) 10 MG tablet Take 1 tablet (10 mg) by mouth nightly as needed for sleep.              Immunization History:     Immunization History   Administered Date(s) Administered     COVID-19 12+ (Pfizer) 10/16/2023     COVID-19 Bivalent 12+ (Pfizer) 09/20/2022     COVID-19 MONOVALENT 12+ (Pfizer) 03/29/2021, 04/22/2021, 10/27/2021     DTaP, Unspecified 06/02/2006     Flu, Unspecified 12/10/2010     HepA-Peds, Unspecified 12/28/1999, 07/13/2000     Influenza (IIV3) PF 11/11/2014     Influenza Vaccine >6 months,quad, PF 11/02/2015, 11/15/2016, 11/09/2018, 11/08/2019, 10/20/2020, 09/28/2021     Influenza Vaccine IM Ages 6-35 Months 4 Valent (PF) 09/20/2022     Influenza,INJ,MDCK,PF,Quad >6mo(Flucelvax) 10/16/2023     Pneumococcal 20 valent Conjugate (Prevnar 20) 07/31/2024     TDAP Vaccine (Adacel) 06/02/2006     Td (Adult), Adsorbed 12/30/1998, 11/09/2018     Tdap (Adult) Unspecified Formulation 11/09/2018     Zoster recombinant adjuvanted (SHINGRIX) 07/31/2024            Allergies:   No Known Allergies         Physical Exam:   Vital signs:  There were no vitals taken for this visit.    Physical Examination:  GENERAL:  well-developed, well-nourished, seated in no acute distress.  HEENT:  Head is normocephalic, atraumatic   EYES:  Eyes have anicteric sclerae without conjunctival injection   ENT:  Oropharynx is moist without exudates or ulcers. Tongue is midline  LUNGS:  Breathing easily on room air  SKIN:  No acute rashes.    NEUROLOGIC:  Grossly nonfocal. Active x4 extremities         Laboratory Data:   Reviewed.  Pertinent for:    Laboratory Data:   Absolute CD4, [place] T Cells   Date Value Ref Range Status    06/12/2024 364 (L) 441 - 2,156 cells/uL Final     Microbiology:  Culture   Date Value Ref Range Status   10/08/2022 No Growth  Final   10/08/2022 No Growth  Final     Metabolic Studies       Recent Labs   Lab Test 07/31/24  1348 06/12/24  1343 06/07/24  1501 11/08/22  1416 10/08/22  1829 10/08/22  0447 10/07/22  0709 10/06/22  0536 10/05/22  1849    143 138 140  --  132* 134   < > 135   POTASSIUM 4.4 4.0 4.0 4.0  --  3.7 3.9   < > 3.6   CHLORIDE 101 105 103 106  --  97 100   < > 99   CO2 27 25 26 25  --  28 27   < > 29   ANIONGAP 10 13 9 9  --  7 7   < > 7   BUN 16.5 20.2* 15.0 16  --  7 11   < > 14   CR 1.05 0.82 0.93 0.99  --  0.94 1.05   < > 1.07   GFRESTIMATED 86 >90 >90 >90  --  >90 88   < > 86   * 117* 94 101*  --  100* 106*   < > 144*   A1C  --   --  5.0  --   --   --   --   --   --    JUAN 10.0 9.1 9.5 9.7  --  9.0 8.5   < > 8.8   PHOS  --   --   --   --   --   --   --   --  2.1*   MAG  --   --   --   --   --  2.0 1.8   < > 2.0   LACT  --   --   --   --  0.7  --   --   --   --     < > = values in this interval not displayed.     Hepatic Studies    Recent Labs   Lab Test 06/12/24  1343 06/07/24  1501 11/08/22  1416 10/08/22  0447 10/07/22  0709 10/06/22  0536   BILITOTAL 0.3 0.5 0.6 2.7* 2.4* 1.9*   ALKPHOS 62 59 60 54 51 57   ALBUMIN 4.3 4.3 3.8 2.3* 2.3* 2.6*   AST 50* 76* 30 41 50* 109*   ALT 59 89* 30 59 71* 120*     Pancreatitis testing    Recent Labs   Lab Test 06/12/24  1343 06/07/24  1501 11/08/22  1416 10/08/22  0447 10/07/22  0709 10/06/22  0536 10/05/22  1849 11/08/19  0000   AMYLASE 31  --   --   --   --   --   --   --    LIPASE 48 63* 90* 570* 661* 922* 1,486*  --    TRIG  --  267* 118  --   --   --   --  118     Hematology Studies      Recent Labs   Lab Test 07/31/24  1348 06/12/24  1343 10/08/22  0447 10/07/22  0709 10/06/22  0536 10/05/22  1849   WBC 3.7* 3.0* 5.9 6.8 6.8 8.4   HGB 15.7 14.5 12.5* 13.1* 14.2 16.7   HCT 45.2 39.8* 37.4* 38.6* 41.4 48.9    177 97* 102* 145*  196     Urine Studies     Recent Labs   Lab Test 06/12/24  1348 10/07/22  2344 10/05/22  1849   URINEPH 7.0 6.5 6.0   NITRITE Negative Negative Negative   LEUKEST Negative Negative Negative   WBCU <1 6* 7*         Latest Ref Rng & Units 7/31/2024     1:48 PM 6/12/2024     1:43 PM 6/7/2024     3:01 PM 11/8/2022     2:16 PM 10/8/2022     4:47 AM   Transplant Immunosuppression Labs   Creat 0.67 - 1.17 mg/dL 1.05  0.82  0.93  0.99  0.94    Urea Nitrogen 7 - 30 mg/dL    16  7    Urea Nitrogen 6.0 - 20.0 mg/dL 16.5  20.2  15.0      WBC 4.0 - 11.0 10e3/uL 3.7  3.0    5.9    Neutrophil % 53  37    67          Again, thank you for allowing me to participate in the care of your patient.      Sincerely,    Ho Angeles MD

## 2024-09-11 NOTE — NURSING NOTE
Chief Complaint   Patient presents with    Follow Up     B20     BP (!) 130/90   Pulse 102   Temp 97.7  F (36.5  C) (Oral)   Ht 1.829 m (6')   Wt 101.6 kg (224 lb)   BMI 30.38 kg/m    Barbie Akers MA on 9/11/2024 at 1:09 PM

## 2024-09-12 ENCOUNTER — TELEPHONE (OUTPATIENT)
Dept: INFECTIOUS DISEASES | Facility: CLINIC | Age: 49
End: 2024-09-12
Payer: COMMERCIAL

## 2024-09-12 NOTE — TELEPHONE ENCOUNTER
Faxed completed and signed disability forms to [facility] to fax number [phone], and placed into scanning.    Cele Becerra, CMA

## 2024-09-12 NOTE — TELEPHONE ENCOUNTER
JACQUE called 9/12 to sched a nurse and lab visit in a month and resched January 2025 appt with Dr. Angeles to December 2024.     ----- Message from Luli HUNTER sent at 9/12/2024  7:56 AM CDT -----    ----- Message -----  From: Ho Angeles MD  Sent: 9/11/2024   5:21 PM CDT  To: [facility] Infectious Disease Adult Csc Rn    Xiomara,    Could we get Mr. Ramirez in for a nurse visit in one month for Shingrix dose #2 and a lab visit for HIV RNA recheck at the same time?    Thanks!    -Avni

## 2024-09-16 ENCOUNTER — OFFICE VISIT (OUTPATIENT)
Dept: URGENT CARE | Facility: URGENT CARE | Age: 49
End: 2024-09-16
Payer: COMMERCIAL

## 2024-09-16 ENCOUNTER — TELEPHONE (OUTPATIENT)
Dept: INFECTIOUS DISEASES | Facility: CLINIC | Age: 49
End: 2024-09-16
Payer: COMMERCIAL

## 2024-09-16 VITALS
SYSTOLIC BLOOD PRESSURE: 128 MMHG | BODY MASS INDEX: 29.69 KG/M2 | OXYGEN SATURATION: 98 % | HEART RATE: 105 BPM | HEIGHT: 73 IN | DIASTOLIC BLOOD PRESSURE: 84 MMHG | RESPIRATION RATE: 16 BRPM | TEMPERATURE: 99.9 F | WEIGHT: 224 LBS

## 2024-09-16 DIAGNOSIS — L02.215 PERINEAL ABSCESS: Primary | ICD-10-CM

## 2024-09-16 PROCEDURE — 87070 CULTURE OTHR SPECIMN AEROBIC: CPT | Performed by: PHYSICIAN ASSISTANT

## 2024-09-16 PROCEDURE — 46050 I&D PERIANAL ABSCESS SUPFC: CPT | Performed by: PHYSICIAN ASSISTANT

## 2024-09-16 PROCEDURE — 87077 CULTURE AEROBIC IDENTIFY: CPT | Performed by: PHYSICIAN ASSISTANT

## 2024-09-16 ASSESSMENT — ENCOUNTER SYMPTOMS: FEVER: 0

## 2024-09-16 NOTE — PROGRESS NOTES
SUBJECTIVE:   Hector Ramirez is a 49 year old male presenting with a chief complaint of   Chief Complaint   Patient presents with    Urgent Care     Concern of anthony anal abscess since Friday, swelling and pain getting worse each day.        He is a new patient of [facility].  HIV positive patient with complaints of perianal abscess x 5 days.  Tactile fever.  No drainage.  Hx of IBS and hx of pancreatitis (2022).  Last etoh was 4 days ago.  No seizures.    Treatment:  tylenol and ibuprofen - last took this morning at 7:30.      Review of Systems   Constitutional:  Negative for fever.   All other systems reviewed and are negative.      No past medical history on file.  Family History   Problem Relation Age of Onset    Alcoholism Father     Alcoholism Paternal Uncle     Alcoholism Paternal Grandmother      Current Outpatient Medications   Medication Sig Dispense Refill    amoxicillin-clavulanate (AUGMENTIN) 875-125 MG tablet Take 1 tablet by mouth 2 times daily for 7 days. 14 tablet 0    bictegravir-emtricitabine-tenofovir (BIKTARVY) -25 MG per tablet Take 1 tablet by mouth daily 90 tablet 2    hydroCHLOROthiazide 12.5 MG tablet Take 1 tablet (12.5 mg) by mouth daily 90 tablet 2    losartan (COZAAR) 50 MG tablet Take 1 tablet (50 mg) by mouth daily 90 tablet 0    sertraline (ZOLOFT) 50 MG tablet Take 1 tablet (50 mg) by mouth daily 90 tablet 1    metoprolol succinate ER (TOPROL XL) 50 MG 24 hr tablet Take 1 tablet (50 mg) by mouth at bedtime 90 tablet 0    nortriptyline (PAMELOR) 10 MG capsule Take 1 capsule (10 mg) by mouth at bedtime 90 capsule 0    omeprazole (PRILOSEC) 20 MG DR capsule Take 20 mg by mouth daily as needed      ondansetron (ZOFRAN) 4 MG tablet Take 1 tablet (4 mg) by mouth every 8 hours as needed for nausea 20 tablet 1    zolpidem (AMBIEN) 10 MG tablet Take 1 tablet (10 mg) by mouth nightly as needed for sleep.       Social History     Tobacco Use    Smoking status: Never    Smokeless tobacco:  "Never   Substance Use Topics    Alcohol use: Yes       OBJECTIVE  /84   Pulse 105   Temp 99.9  F (37.7  C) (Temporal)   Resp 16   Ht 1.854 m (6' 1\")   Wt 101.6 kg (224 lb)   SpO2 98%   BMI 29.55 kg/m      Physical Exam  Vitals reviewed.   Constitutional:       Appearance: Normal appearance. He is normal weight.   Cardiovascular:      Rate and Rhythm: Normal rate.   Genitourinary:     Comments: Right of anus with 4 cm buoyant fluid collection - tender to palpation.    Neurological:      Mental Status: He is alert.         Labs:  No results found for this or any previous visit (from the past 24 hour(s)).      ASSESSMENT:      ICD-10-CM    1. Perineal abscess  L02.215 amoxicillin-clavulanate (AUGMENTIN) 875-125 MG tablet     Abscess Aerobic Bacterial Culture Routine Without Gram Stain           Medical Decision Making:    Differential Diagnosis:  Perianal abscess    Serious Comorbid Conditions:  Adult:  HIV and reviewed    PLAN:    PROCEDURE:  right side of anus, cleaned with betadine, 1 ml of lidocaine injected.  Using a #11 blade, incision made, thick tan colored fluid expressed.  Culture taken.  Procedure well tolerated by patient.      Rx for augmentin.  Will call with any concerning results.  Discussed reasons to seek immediate medical attention.  Additionally if no improvement or worsening in one week, may follow up with PCP and/or UC.        Followup:    If not improving or if condition worsens, follow up with your Primary Care Provider, If not improving or if conditions worsens over the next 12-24 hours, go to the Emergency Department    There are no Patient Instructions on file for this visit.      "

## 2024-09-16 NOTE — TELEPHONE ENCOUNTER
Returned call to patient. Discussed that we would advise patient to follow up with PCP if he has one. Patient does not currently have a PCP as he is in the process of switching PCP's. He called the provider he is scheduled with in October to see if this could be moved up, but unfortunately they were not able to. Discussed that we would recommend urgent care and patient was agreeable to plan due to significant discomfort. He will proceed to UC and had no further questions.

## 2024-09-16 NOTE — TELEPHONE ENCOUNTER
[facility] Call Center    Phone Message    May a detailed message be left on voicemail: yes     Reason for Call: Symptoms or Concerns     If patient has red-flag symptoms, warm transfer to triage line    Current symptom or concern: Fever, Night Sweats, has a boil/rash very painful in between butt cheeks.     Symptoms have been present for:  1-2 week(s)    Has patient previously been seen for this? Yes    By  Dr. Angeles     Date: 9/11/2024    Are there any new or worsening symptoms? Yes: Boil has formed very painful     Action Taken: Other: ID     Travel Screening: Not Applicable     Date of Service:

## 2024-09-17 NOTE — PROGRESS NOTES
[facility] Psychiatry Services [facility]  September 18, 2024      Behavioral Health Clinician Progress Note    Patient Name: Hector Ramirez           Service Type:  Individual      [place]   [facility] [place] (patient reached)     Session Start Time: 100 pm  Session End Time: 131 pm      Session Length: 16 - 37      Attendees: Patient     Service Modality:  Video Visit:      Provider verified identity through the following two step process.  Patient provided:  Patient photo and Patient is known previously to provider    Telemedicine Visit: The patient's condition can be safely assessed and treated via synchronous audio and visual telemedicine encounter.      Reason for Telemedicine Visit: Patient convenience (e.g. access to timely appointments / distance to available provider)    Originating Site (Patient Location): Patient's home    Distant Site (Provider Location): Provider Remote Setting- Home Office    Consent:  The patient/guardian has verbally consented to: the potential risks and benefits of telemedicine (video visit) versus in person care; bill my insurance or make self-payment for services provided; and responsibility for payment of non-covered services.     Patient would like the video invitation sent by:  My Chart    Mode of Communication:  Video Conference via [employer]    Distant Location (Provider):  Off-site    As the provider I attest to compliance with applicable laws and regulations related to telemedicine.    Visit Activities (Refresh list every visit): [facility] Only    Diagnostic Assessment Date: 08/06/2024  Treatment Plan Review Date: 12/18/2024  See Flowsheets for today's PHQ-9 and DIANNE-7 results  Previous PHQ-9:       3/31/2023     4:10 PM 8/6/2024     3:35 PM 9/18/2024    12:18 PM   PHQ-9 SCORE   PHQ-9 Total Score [facility] [place] 2 (Minimal depression) 11 (Moderate depression) 11 (Moderate depression)   PHQ-9 Total Score 2 11 11     Previous DIANNE-7:       3/31/2023     4:10 PM  8/2/2024     9:27 AM 9/10/2024    12:11 AM   DIANNE-7 SCORE   Total Score 3 (minimal anxiety) 9 (mild anxiety) 6 (mild anxiety)   Total Score 3 9 6    6       RICHARD LEVEL:       No data to display                DATA  Extended Session (60+ minutes): No  Interactive Complexity: No  Crisis: No  [facility] Patient: No    Treatment Objective(s) Addressed in This Session:  Target Behavior(s):  develop routine    Depressed Mood: Increase interest, engagement, and pleasure in doing things  Decrease frequency and intensity of feeling down, depressed, hopeless    Current Stressors / Issues:  MH update: [facility] assessed SI. Pt states this was from the past. Denies any currently. Pt reports that he had to go to urgent care for an abscess but it is healing. His viral load increased at his last visit but it may have been the abscess. He reports that his depression varies. One of his medications can cause depression. He's on zoloft but it doesn't work consistently. It is prescribed by his PCP and will be following up on that soon. [facility] explored triggers for his depression. He identifies his health is primary. He has some ASE with from medication (fatigue). [facility] explored skills he can use to manage depression. He identifies calling friends, taking dog to the dog park, being outdoors. He has a lot of anxiety which manifests in procrastinating around important tasks. [facility] and pt explored ways to manage. He reports that he has organized a calendar to help him, set reminders. He is able to prioritize appropriately. His health is the primary  of his depression. [facility] explored thoughts that contribute. He worries about dating, what will people think. Only one person in his [place] knows. He isn't telling his family right now because of his M's health. Stress mgt: return to gym, spend time outdoors.   Stresses: chronic health condition, his M has been dx with CA  Appetite: unremarkable  Sleep: trouble falling asleep  Therapy: need order?, support  group?  ANTIONE: no  Preg: NA  Interventions: goal development, [facility] sent information on support groups, mindfulness      Progress on Treatment Objective(s) / Homework:  Satisfactory progress - ACTION (Actively working towards change); Intervened by reinforcing change plan / affirming steps taken    Motivational Interviewing    MI Intervention: Co-Developed Goal: develop routine, Expressed Empathy/Understanding, Open-ended questions, Reflections: simple and complex, Change talk (evoked), and Reframe     Change Talk Expressed by the Patient: Desire to change Ability to change Reasons to change Need to change Committment to change Activation Taking steps    Provider Response to Change Talk: E - Evoked more info from patient about behavior change, A - Affirmed patient's thoughts, decisions, or attempts at behavior change, R - Reflected patient's change talk, and S - Summarized patient's change talk statements    Also provided psychoeducation about behavioral health condition, symptoms, and treatment options    Assessments completed prior to visit:  The following assessments were completed by patient for this visit:  PROMIS 10-Global Health (all questions and answers displayed):       8/2/2024     9:29 AM   PROMIS 10   In general, would you say your health is: Good   In general, would you say your quality of life is: Fair   In general, how would you rate your physical health? Good   In general, how would you rate your mental health, including your mood and your ability to think? Poor   In general, how would you rate your satisfaction with your social activities and relationships? Fair   In general, please rate how well you carry out your usual social activities and roles Fair   To what extent are you able to carry out your everyday physical activities such as walking, climbing stairs, carrying groceries, or moving a chair? Completely   In the past 7 days, how often have you been bothered by emotional problems such as feeling  anxious, depressed, or irritable? Often   In the past 7 days, how would you rate your fatigue on average? Severe   In the past 7 days, how would you rate your pain on average, where 0 means no pain, and 10 means worst imaginable pain? 3   In general, would you say your health is: 3   In general, would you say your quality of life is: 2   In general, how would you rate your physical health? 3   In general, how would you rate your mental health, including your mood and your ability to think? 1   In general, how would you rate your satisfaction with your social activities and relationships? 2   In general, please rate how well you carry out your usual social activities and roles. (This includes activities at home, at work and in your community, and responsibilities as a parent, child, spouse, employee, friend, etc.) 2   To what extent are you able to carry out your everyday physical activities such as walking, climbing stairs, carrying groceries, or moving a chair? 5   In the past 7 days, how often have you been bothered by emotional problems such as feeling anxious, depressed, or irritable? 4   In the past 7 days, how would you rate your fatigue on average? 4   In the past 7 days, how would you rate your pain on average, where 0 means no pain, and 10 means worst imaginable pain? 3   Global Mental Health Score 7   Global Physical Health Score 14   PROMIS TOTAL - SUBSCORES 21     PROMIS 10-Global Health (only subscores and total score):       8/2/2024     9:29 AM   PROMIS-10 Scores Only   Global Mental Health Score 7   Global Physical Health Score 14   PROMIS TOTAL - SUBSCORES 21       Care Plan review completed: Yes    Medication Review:  No changes to current psychiatric medication(s)    Medication Compliance:  Yes    Changes in Health Issues:   None reported    Chemical Use Review:   Substance Use: Chemical use reviewed, no active concerns identified      Tobacco Use: No current tobacco use.      Assessment: Current  Emotional / Mental Status (status of significant symptoms):  Risk status (Self / Other harm or suicidal ideation)  Patient denies a history of suicidal ideation, suicide attempts, self-injurious behavior, homicidal ideation, homicidal behavior, and and other safety concerns  Patient denies current fears or concerns for personal safety.  Patient denies current or recent suicidal ideation or behaviors.  Patient denies current or recent homicidal ideation or behaviors.  Patient denies current or recent self injurious behavior or ideation.  Patient denies other safety concerns.  A safety and risk management plan has not been developed at this time, however patient was encouraged to call [place] [phone] should there be a change in any of these risk factors.    Appearance:   Appropriate   Eye Contact:   Good   Psychomotor Behavior: Normal   Attitude:   Cooperative   Orientation:   All  Speech   Rate / Production: Normal    Volume:  Normal   Mood:    Depressed   Affect:    Appropriate   Thought Content:  Clear   Thought Form:  Coherent  Logical   Insight:    Good     Diagnoses:  1. Depression, major, recurrent, moderate (H)        Collateral Reports Completed:  Not Applicable    Plan: (Homework, other):  Patient was given information about behavioral services and encouraged to schedule a follow up appointment with the clinic [facility] in 2 weeks.  He was also given information about mental health symptoms and treatment options .  CD Recommendations: No indications of CD issues.       Kirstin Medina, [facility]    ______________________________________________________________________    Integrated Primary Care Behavioral Health Treatment Plan    Individual Treatment Plan    Patient's Name: Hector Ramirez   YOB: 1975  Date of Creation: 09/18/2024  Date Treatment Plan Last Reviewed/Revised: pending    DSM5 Diagnoses: 296.32 (F33.1) Major Depressive Disorder, Recurrent Episode, Moderate With anxious  distress  Psychosocial / Contextual Factors: Medical Complexities  PROMIS (reviewed every 90 days):   The following assessments were completed by patient for this visit:  PROMIS 10-Global Health (all questions and answers displayed):       8/2/2024     9:29 AM   PROMIS 10   In general, would you say your health is: Good   In general, would you say your quality of life is: Fair   In general, how would you rate your physical health? Good   In general, how would you rate your mental health, including your mood and your ability to think? Poor   In general, how would you rate your satisfaction with your social activities and relationships? Fair   In general, please rate how well you carry out your usual social activities and roles Fair   To what extent are you able to carry out your everyday physical activities such as walking, climbing stairs, carrying groceries, or moving a chair? Completely   In the past 7 days, how often have you been bothered by emotional problems such as feeling anxious, depressed, or irritable? Often   In the past 7 days, how would you rate your fatigue on average? Severe   In the past 7 days, how would you rate your pain on average, where 0 means no pain, and 10 means worst imaginable pain? 3   In general, would you say your health is: 3   In general, would you say your quality of life is: 2   In general, how would you rate your physical health? 3   In general, how would you rate your mental health, including your mood and your ability to think? 1   In general, how would you rate your satisfaction with your social activities and relationships? 2   In general, please rate how well you carry out your usual social activities and roles. (This includes activities at home, at work and in your community, and responsibilities as a parent, child, spouse, employee, friend, etc.) 2   To what extent are you able to carry out your everyday physical activities such as walking, climbing stairs, carrying  "groceries, or moving a chair? 5   In the past 7 days, how often have you been bothered by emotional problems such as feeling anxious, depressed, or irritable? 4   In the past 7 days, how would you rate your fatigue on average? 4   In the past 7 days, how would you rate your pain on average, where 0 means no pain, and 10 means worst imaginable pain? 3   Global Mental Health Score 7   Global Physical Health Score 14   PROMIS TOTAL - SUBSCORES 21     PROMIS 10-Global Health (only subscores and total score):       8/2/2024     9:29 AM   PROMIS-10 Scores Only   Global Mental Health Score 7   Global Physical Health Score 14   PROMIS TOTAL - SUBSCORES 21        Referral / Collaboration:  The following referral(s) will be initiated: long term therapy .    Anticipated number of session for this episode of care: 3-6 sessions  Anticipation frequency of session: Biweekly  Anticipated Duration of each session: 16-37 minutes  Treatment plan will be reviewed in 90 days or when goals have been changed.       MeasurableTreatment Goal(s) related to diagnosis / functional impairment(s)  Goal 1: Patient will experience improved depression   \"I will know I've met my goal when I have a better routine.\"     Objective #A (Patient Action)    Patient will Increase interest, engagement, and pleasure in doing things  Decrease frequency and intensity of feeling down, depressed, hopeless  Status: New - Date: 09/18/2024      Intervention(s)  [facility] provided psycho-education for symptom management including the following:     https://www.Evverube.com/watch?v=Hd8ufMxPaK5  https://www.Evverube.com/watch?v=7-7S2OyFzaR  https://[facility]/for-community/mindfulness-programs/mindfulness-based-stress-reduction       Slow breathing. When you're anxious, your breathing becomes faster and shallower. Try deliberately slowing down your breathing. Count to three as you breathe in slowly - then count to three as you breathe out slowly.    Progressive muscle " relaxation. Find a quiet location. Close your eyes and slowly tense and then relax each of your muscle groups from your toes to your head. Hold the tension for three seconds and then release quickly. This can help reduce the feelings of muscle tension that often comes with anxiety.    Stay in the present moment. Anxiety can make your thoughts live in a terrible future that hasn't happened yet. Try to bring yourself back to where you are. Practising meditation can help.    Healthy lifestyle. Keeping active, eating well, going out into nature, spending time with family and friends, reducing stress and doing the activities you enjoy are all effective in reducing anxiety and improving your wellbeing.       Take small acts of bravery. Avoiding what makes you anxious provides some relief in the short term, but can make you more anxious in the long term. Try approaching something that makes you anxious - even in a small way. The way through anxiety is by learning that what you fear isn't likely to happen - and if it does, you'll be able to cope with it.    Challenge your self-talk. How you think affects how you feel. Anxiety can make you overestimate the danger in a situation and underestimate your ability to handle it. Try to think of different interpretations to a situation that's making you anxious, rather than jumping to the worst-case scenario. Look at the facts for and against your thought being true.    Plan worry time. It's hard to stop worrying entirely so set aside some time to indulge your worries. Even 10 minutes each evening to write them down or go over them in your head can help stop your worries from taking over at other times.    Get to know your anxiety. Keep a diary of when it's at it's best - and worst. Find the patterns and plan your week - or day - to proactively manage your anxiety.    Learn from others. Talking with others who also experience anxiety - or are going through something similar - can  help you feel less alone. Visit our Online Forums to connect with others.    Be kind to yourself. Remember that you are not your anxiety. You are not weak. You are not inferior. You have a mental health condition. It's called anxiety.     Body based skills: Change body temperature by holding ice cube, running hands under cold water, drinking cold water. Eat an altoid mint or other strongly flavored mint candy or sour candy like Warheads or lemon drops. The sour taste will quickly distract from the anxiety and the sweet flavor at the end is rewarding to the brain.     Box Breathing:  Step 1: Breathe in, counting to four slowly. Feel the air enter your lungs.  Step 2: Hold your breath for 4 seconds. Try to avoid inhaling or exhaling for 4 seconds.  Step 3: Slowly exhale through your mouth for 4 seconds.  Step 4: Repeat steps 1 to 3 until you feel re-centered.    5 Things exercise: find 5 things in the environment that are the same color, 4 that are a same shape, 3 that are the same size, 2 smells, 1 sound      [facility] provided psycho-education on Acceptance and Commitment skills including:  -values clarification  -behavioral activation planning including overcoming barriers  -mindfulness  -cognitive diffusion skill  -facilitate self acceptance    [facility] provided psycho-education on sleep skills including:  -sleep hygiene  -tracking sleep schedule  -creating sleep routine    [facility] provided psycho-education on DBT skills  -mindfulness  -mood regulation techniques    -5 things exercise    -somatic regulation skills (taste of something sweet or sour, notice smells, touch/feel of things in the environment, physical activity such as dancing, walking)    [facility] provided psycho-education of CBT skills including the following:  -challenging destructive thinking  -re-framing  -behavioral activation  -journaling  -mood/behavioral tracking  -exposure response  -mood regulation (deep breathing, somatic skills)    Patient has reviewed and  agreed to the above plan.    Written by  Kirstin Medina, LICSW, [facility]

## 2024-09-18 ENCOUNTER — VIRTUAL VISIT (OUTPATIENT)
Dept: BEHAVIORAL HEALTH | Facility: CLINIC | Age: 49
End: 2024-09-18
Payer: COMMERCIAL

## 2024-09-18 DIAGNOSIS — F33.1 DEPRESSION, MAJOR, RECURRENT, MODERATE (H): Primary | ICD-10-CM

## 2024-09-18 PROCEDURE — 90832 PSYTX W PT 30 MINUTES: CPT | Mod: 95 | Performed by: SOCIAL WORKER

## 2024-09-19 LAB — BACTERIA ABSC ANAEROBE+AEROBE CULT: ABNORMAL

## 2024-09-20 ENCOUNTER — TELEPHONE (OUTPATIENT)
Dept: URGENT CARE | Facility: URGENT CARE | Age: 49
End: 2024-09-20
Payer: COMMERCIAL

## 2024-09-20 NOTE — TELEPHONE ENCOUNTER
Test Results    Contacts       Contact Date/Time Type Contact Phone/Fax    09/20/2024 01:52 PM CDT Phone (Incoming) Hector Ramirez (Self) [phone] (H)            Who ordered the test:      Sheeba Reardon       Type of test: Lab    Date of test:  9/16    Where was the test performed:  [facility]    What are your questions/concerns?:  Wants to know now that results are back does he need a different antibiotic or continue to take the one he is currently on    Could we send this information to you in [place] or would you prefer to receive a phone call?:   No preference   Okay to leave a detailed message?: Yes at Cell number on file:    Telephone Information:   Mobile [phone]

## 2024-09-26 DIAGNOSIS — I10 ESSENTIAL HYPERTENSION: ICD-10-CM

## 2024-09-27 RX ORDER — METOPROLOL SUCCINATE 50 MG/1
50 TABLET, EXTENDED RELEASE ORAL AT BEDTIME
Qty: 90 TABLET | Refills: 0 | Status: SHIPPED | OUTPATIENT
Start: 2024-09-27 | End: 2024-10-07

## 2024-10-05 ASSESSMENT — ANXIETY QUESTIONNAIRES
8. IF YOU CHECKED OFF ANY PROBLEMS, HOW DIFFICULT HAVE THESE MADE IT FOR YOU TO DO YOUR WORK, TAKE CARE OF THINGS AT HOME, OR GET ALONG WITH OTHER PEOPLE?: SOMEWHAT DIFFICULT
5. BEING SO RESTLESS THAT IT IS HARD TO SIT STILL: NOT AT ALL
3. WORRYING TOO MUCH ABOUT DIFFERENT THINGS: SEVERAL DAYS
7. FEELING AFRAID AS IF SOMETHING AWFUL MIGHT HAPPEN: MORE THAN HALF THE DAYS
GAD7 TOTAL SCORE: 6
4. TROUBLE RELAXING: SEVERAL DAYS
IF YOU CHECKED OFF ANY PROBLEMS ON THIS QUESTIONNAIRE, HOW DIFFICULT HAVE THESE PROBLEMS MADE IT FOR YOU TO DO YOUR WORK, TAKE CARE OF THINGS AT HOME, OR GET ALONG WITH OTHER PEOPLE: SOMEWHAT DIFFICULT
GAD7 TOTAL SCORE: 6
GAD7 TOTAL SCORE: 6
7. FEELING AFRAID AS IF SOMETHING AWFUL MIGHT HAPPEN: MORE THAN HALF THE DAYS
1. FEELING NERVOUS, ANXIOUS, OR ON EDGE: SEVERAL DAYS
2. NOT BEING ABLE TO STOP OR CONTROL WORRYING: SEVERAL DAYS
6. BECOMING EASILY ANNOYED OR IRRITABLE: NOT AT ALL

## 2024-10-07 ENCOUNTER — OFFICE VISIT (OUTPATIENT)
Dept: FAMILY MEDICINE | Facility: CLINIC | Age: 49
End: 2024-10-07
Payer: COMMERCIAL

## 2024-10-07 VITALS
OXYGEN SATURATION: 99 % | SYSTOLIC BLOOD PRESSURE: 140 MMHG | WEIGHT: 230.5 LBS | TEMPERATURE: 97.4 F | HEIGHT: 73 IN | HEART RATE: 117 BPM | RESPIRATION RATE: 16 BRPM | DIASTOLIC BLOOD PRESSURE: 98 MMHG | BODY MASS INDEX: 30.55 KG/M2

## 2024-10-07 DIAGNOSIS — K21.9 GASTROESOPHAGEAL REFLUX DISEASE WITHOUT ESOPHAGITIS: ICD-10-CM

## 2024-10-07 DIAGNOSIS — I10 ESSENTIAL HYPERTENSION: Primary | ICD-10-CM

## 2024-10-07 DIAGNOSIS — F43.10 PTSD (POST-TRAUMATIC STRESS DISORDER): ICD-10-CM

## 2024-10-07 DIAGNOSIS — G47.00 INSOMNIA, UNSPECIFIED TYPE: ICD-10-CM

## 2024-10-07 DIAGNOSIS — R00.0 TACHYCARDIA: ICD-10-CM

## 2024-10-07 DIAGNOSIS — F10.11 HISTORY OF ALCOHOL ABUSE: ICD-10-CM

## 2024-10-07 PROBLEM — F43.9 STRESS: Status: RESOLVED | Noted: 2021-09-28 | Resolved: 2024-10-07

## 2024-10-07 PROBLEM — K85.20 ALCOHOL-INDUCED ACUTE PANCREATITIS WITHOUT INFECTION OR NECROSIS: Status: RESOLVED | Noted: 2022-10-06 | Resolved: 2024-10-07

## 2024-10-07 PROCEDURE — 99214 OFFICE O/P EST MOD 30 MIN: CPT | Performed by: FAMILY MEDICINE

## 2024-10-07 RX ORDER — METOPROLOL SUCCINATE 100 MG/1
100 TABLET, EXTENDED RELEASE ORAL EVERY EVENING
Qty: 30 TABLET | Refills: 2 | Status: SHIPPED | OUTPATIENT
Start: 2024-10-07

## 2024-10-07 RX ORDER — ZOLPIDEM TARTRATE 10 MG/1
10 TABLET ORAL
Status: SHIPPED
Start: 2024-10-07 | End: 2024-10-07

## 2024-10-07 RX ORDER — PRAZOSIN HYDROCHLORIDE 1 MG/1
CAPSULE ORAL
Qty: 60 CAPSULE | Refills: 1 | Status: SHIPPED | OUTPATIENT
Start: 2024-10-07 | End: 2024-11-06

## 2024-10-07 RX ORDER — ZOLPIDEM TARTRATE 10 MG/1
5-10 TABLET ORAL
Qty: 10 TABLET | Refills: 0 | Status: SHIPPED | OUTPATIENT
Start: 2024-10-07 | End: 2024-11-06

## 2024-10-07 RX ORDER — LOSARTAN POTASSIUM 50 MG/1
50 TABLET ORAL EVERY MORNING
Status: SHIPPED
Start: 2024-10-07

## 2024-10-07 ASSESSMENT — PATIENT HEALTH QUESTIONNAIRE - PHQ9
10. IF YOU CHECKED OFF ANY PROBLEMS, HOW DIFFICULT HAVE THESE PROBLEMS MADE IT FOR YOU TO DO YOUR WORK, TAKE CARE OF THINGS AT HOME, OR GET ALONG WITH OTHER PEOPLE: SOMEWHAT DIFFICULT
SUM OF ALL RESPONSES TO PHQ QUESTIONS 1-9: 9
SUM OF ALL RESPONSES TO PHQ QUESTIONS 1-9: 9

## 2024-10-07 ASSESSMENT — PAIN SCALES - GENERAL: PAINLEVEL: MILD PAIN (2)

## 2024-10-07 NOTE — PATIENT INSTRUCTIONS
Blood pressure high today  Increase metoprolol from 50 to 100 mg each night  Keep losartan the same, take in morning  STOP taking the hydrochlorothiazide  Check blood pressure once a day - I will send you a [employer] message with some instructions  More gym, less caffeine, less alcohol    For PTSD  Start prazosin 1 tablet at bedtime for 7 days, then 2 tablets at bedtime  Should help with dreams  Let me know if this causes lightheadedness  Do not take prazosin and Ambien on the same night    Take omeprazole each morning    Let's meet again in 2-3 weeks to reassess the medications

## 2024-10-07 NOTE — PROGRESS NOTES
"Assessment/Plan.    HTN. Poorly controlled.  -cut down on EtOH  -continue exercise, healthy eating  -consolidate meds due to reported challenges with adherence  -stop hydrochlorothiazide  -continue losartan 50 mg/morning, take daily rather than \"as needed\"  -increase metoprolol XL from 50 to 100 mg/night. Should also help w/ tachycardia. Risk of dizziness reviewed    PTSD. Poorly controlled.  -continue behavioral therapy  -continue sertraline 50 mg/d  -start prazosin at bedtime. Risk of dizziness reviewed  -continue Ambien 10 mg/night prn. Reviewed risk of dependence. Reviewed that my hope is to gradually replace Ambien with prazosin. Will decrease from 21 tabs/month to 10 tabs/month    GERD. Poorly controlled. Biktarvy may be contributing.  -change omeprazole from 20 mg as needed to daily  -pursue taper after 1-2 months    Next visit in 2-3 weeks for med check.      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 [employer]        Updated 10/7/2024     Diagnosed w/ HIV in 2024  Planning to transition from oral ART to [place]enuva    HTN diagnosed in his 40s  Walks daily, uses weights at home  Gets healthy meals from [employer]  Lots of recent stress - taxes, landlord duties  On metoprolol, also helps w/ tachycardia, last dose yesterday  On hydrochlorothiazide, last dose yesterday  On losartan, takes this only when he feels BP is high, last dose today  Doesn't have a home BP monitor    Hx heavy EtOH use  Did counseling 3-4 yrs ago  Use fluctuates    PTSD  Pandemic was challenging - properties were vandalized  Has nightmares about that time  Also feels on edge during day  Hard to focus  Trying to sell some properties to reduce stress  On sertraline  Takes Ambien 2-3 x/wk, has been using this for >1 year    GERD  No melena  No " "dysphagia  No prior EGD  Takes omeprazole about every other day    Exam  BP (!) 140/98 (BP Location: Left arm)   Pulse 117   Temp 97.4  F (36.3  C) (Temporal)   Resp 16   Ht 1.854 m (6' 1\")   Wt 104.6 kg (230 lb 8 oz)   SpO2 99%   BMI 30.41 kg/m      Fidgety, affect normal  "

## 2024-10-07 NOTE — PROGRESS NOTES
"  {PROVIDER CHARTING PREFERENCE:377189}    Jose Young is a 49 year old, presenting for the following health issues:  Establish Care and Recheck Medication      10/7/2024    12:50 PM   Additional Questions   Roomed by Jeri   Accompanied by Self     History of Present Illness       Mental Health Follow-up:  Patient presents to follow-up on Depression & Anxiety.Patient's depression since last visit has been:  No change  The patient is not having other symptoms associated with depression.  Patient's anxiety since last visit has been:  No change  The patient is having other symptoms associated with anxiety.  Any significant life events: health concerns  Patient is feeling anxious or having panic attacks.  Patient has no concerns about alcohol or drug use.    Hyperlipidemia:  He presents for follow up of hyperlipidemia.   He is not taking medication to lower cholesterol. He is not having myalgia or other side effects to statin medications.    Hypertension: He presents for follow up of hypertension.  He does not check blood pressure  regularly outside of the clinic. Outside blood pressures have been over 140/90. He does not follow a low salt diet.     Headaches:   Since the patient's last clinic visit, headaches are: no change  The patient is getting headaches:  3-4x week  He is able to do normal daily activities when he has a migraine.  The patient is taking the following rescue/relief medications:  Ibuprofen (Advil, Motrin), Tylenol and other   Patient states \"I get some relief\" from the rescue/relief medications.   The patient is taking the following medications to prevent migraines:  No medications to prevent migraines  In the past 4 weeks, the patient has gone to an Urgent Care or Emergency Room 0 times times due to headaches.    He eats 2-3 servings of fruits and vegetables daily.He consumes 2 sweetened beverage(s) daily.He exercises with enough effort to increase his heart rate 30 to 60 minutes per " "day.  He exercises with enough effort to increase his heart rate 7 days per week.   He is taking medications regularly.       {MA/LPN/RN Pre-Provider Visit Orders- hCG/UA/Strep (Optional):377148}  {SUPERLIST (Optional):141859}  {additonal problems for provider to add (Optional):216494}    {ROS Picklists (Optional):826182}      Objective    BP (!) 142/89   Pulse 117   Temp 97.4  F (36.3  C) (Temporal)   Resp 16   Ht 1.854 m (6' 1\")   Wt 104.6 kg (230 lb 8 oz)   SpO2 99%   BMI 30.41 kg/m    Body mass index is 30.41 kg/m .  Physical Exam   {Exam List (Optional):051650}    {Diagnostic Test Results (Optional):078966}        Signed Electronically by: Romario Dockery MD  {Email feedback regarding this note to [email or web address]   :500404}  "

## 2024-10-08 PROBLEM — F10.90 HEAVY ALCOHOL USE: Status: ACTIVE | Noted: 2024-10-08

## 2024-10-08 PROBLEM — F10.11 HISTORY OF ALCOHOL ABUSE: Status: ACTIVE | Noted: 2024-10-08

## 2024-10-08 PROBLEM — Z78.9 HEPATITIS B IMMUNE: Status: RESOLVED | Noted: 2019-11-08 | Resolved: 2024-10-08

## 2024-10-08 PROBLEM — K21.9 GASTROESOPHAGEAL REFLUX DISEASE WITHOUT ESOPHAGITIS: Status: ACTIVE | Noted: 2024-10-08

## 2024-10-08 PROBLEM — Z21 ASYMPTOMATIC HUMAN IMMUNODEFICIENCY VIRUS INFECTION (H): Status: ACTIVE | Noted: 2024-10-08

## 2024-10-08 PROBLEM — R00.0 SINUS TACHYCARDIA: Status: ACTIVE | Noted: 2024-10-08

## 2024-10-08 PROBLEM — F43.10 PTSD (POST-TRAUMATIC STRESS DISORDER): Status: ACTIVE | Noted: 2024-10-08

## 2024-10-11 ENCOUNTER — ALLIED HEALTH/NURSE VISIT (OUTPATIENT)
Dept: TRANSPLANT | Facility: CLINIC | Age: 49
End: 2024-10-11
Payer: COMMERCIAL

## 2024-10-11 ENCOUNTER — LAB (OUTPATIENT)
Dept: LAB | Facility: CLINIC | Age: 49
End: 2024-10-11
Payer: COMMERCIAL

## 2024-10-11 VITALS
DIASTOLIC BLOOD PRESSURE: 93 MMHG | WEIGHT: 228.8 LBS | SYSTOLIC BLOOD PRESSURE: 148 MMHG | TEMPERATURE: 98.4 F | OXYGEN SATURATION: 99 % | BODY MASS INDEX: 30.19 KG/M2 | HEART RATE: 85 BPM

## 2024-10-11 DIAGNOSIS — Z23 NEED FOR VACCINATION: Primary | ICD-10-CM

## 2024-10-11 DIAGNOSIS — Z21 ASYMPTOMATIC HUMAN IMMUNODEFICIENCY VIRUS (HIV) INFECTION STATUS (H): ICD-10-CM

## 2024-10-11 PROCEDURE — 87536 HIV-1 QUANT&REVRSE TRNSCRPJ: CPT | Performed by: STUDENT IN AN ORGANIZED HEALTH CARE EDUCATION/TRAINING PROGRAM

## 2024-10-11 PROCEDURE — 90611 SMALLPOX&MONKEYPOX VAC 0.5ML: CPT | Performed by: STUDENT IN AN ORGANIZED HEALTH CARE EDUCATION/TRAINING PROGRAM

## 2024-10-11 PROCEDURE — 36415 COLL VENOUS BLD VENIPUNCTURE: CPT | Performed by: PATHOLOGY

## 2024-10-11 PROCEDURE — 250N000011 HC RX IP 250 OP 636: Performed by: STUDENT IN AN ORGANIZED HEALTH CARE EDUCATION/TRAINING PROGRAM

## 2024-10-11 PROCEDURE — 90472 IMMUNIZATION ADMIN EACH ADD: CPT | Performed by: STUDENT IN AN ORGANIZED HEALTH CARE EDUCATION/TRAINING PROGRAM

## 2024-10-11 PROCEDURE — 90619 MENACWY-TT VACCINE IM: CPT | Performed by: STUDENT IN AN ORGANIZED HEALTH CARE EDUCATION/TRAINING PROGRAM

## 2024-10-11 PROCEDURE — 250N000021 HC RX MED A9270 GY (STAT IND- M) 250: Performed by: STUDENT IN AN ORGANIZED HEALTH CARE EDUCATION/TRAINING PROGRAM

## 2024-10-11 PROCEDURE — 99000 SPECIMEN HANDLING OFFICE-LAB: CPT | Performed by: PATHOLOGY

## 2024-10-11 PROCEDURE — 90750 HZV VACC RECOMBINANT IM: CPT | Performed by: STUDENT IN AN ORGANIZED HEALTH CARE EDUCATION/TRAINING PROGRAM

## 2024-10-11 PROCEDURE — 90471 IMMUNIZATION ADMIN: CPT | Performed by: STUDENT IN AN ORGANIZED HEALTH CARE EDUCATION/TRAINING PROGRAM

## 2024-10-11 RX ADMIN — ZOSTER VACCINE RECOMBINANT, ADJUVANTED 0.5 ML: KIT at 13:54

## 2024-10-11 RX ADMIN — RABIES VACCINE 0.5 ML: KIT at 14:01

## 2024-10-11 RX ADMIN — NEISSERIA MENINGITIDIS GROUP A CAPSULAR POLYSACCHARIDE TETANUS TOXOID CONJUGATE ANTIGEN, NEISSERIA MENINGITIDIS GROUP C CAPSULAR POLYSACCHARIDE TETANUS TOXOID CONJUGATE ANTIGEN, NEISSERIA MENINGITIDIS GROUP Y CAPSULAR POLYSACCHARIDE TETANUS TOXOID CONJUGATE ANTIGEN, AND NEISSERIA MENINGITIDIS GROUP W-135 CAPSULAR POLYSACCHARIDE TETANUS TOXOID CONJUGATE ANTIGEN 0.5 ML: 10; 10; 10; 10 INJECTION, SOLUTION INTRAMUSCULAR at 13:58

## 2024-10-11 ASSESSMENT — PAIN SCALES - GENERAL: PAINLEVEL: NO PAIN (0)

## 2024-10-12 LAB
HIV1 RNA # PLAS NAA DL=20: 78 COPIES/ML
HIV1 RNA SERPL NAA+PROBE-LOG#: 1.9 {LOG_COPIES}/ML

## 2024-11-05 ENCOUNTER — MYC MEDICAL ADVICE (OUTPATIENT)
Dept: FAMILY MEDICINE | Facility: CLINIC | Age: 49
End: 2024-11-05
Payer: COMMERCIAL

## 2024-11-08 ENCOUNTER — MYC REFILL (OUTPATIENT)
Dept: FAMILY MEDICINE | Facility: CLINIC | Age: 49
End: 2024-11-08
Payer: COMMERCIAL

## 2024-11-08 DIAGNOSIS — G47.00 INSOMNIA, UNSPECIFIED TYPE: ICD-10-CM

## 2024-11-08 RX ORDER — ZOLPIDEM TARTRATE 10 MG/1
5-10 TABLET ORAL
Qty: 10 TABLET | Refills: 0 | OUTPATIENT
Start: 2024-11-08

## 2024-12-03 NOTE — PROGRESS NOTES
[facility] Infect[facility] Disease [facility] (TELEPHONE VISIT)  Dr. Ho Angeles, [facility], Floor 3  [address] [place] [address]   Patient:  Hector Ramirez, Date of birth 1975, Medical record number [id number]  Date of Visit:  12/04/2024           Assessment and Recommendations:   ID Problem List:  Asymptomatic HIV  Latest HIV RNA (10/11/24) - 78 (down from 410 on 9/3/24)  Latest CD4 (6/12/24) - 364  HLAB*57:01 negative (6/18/24)  Toxoplasma IgG (6/12/24) - negative  CMV IgG (6/12/24) - 1.9  HAV Ab+  HBcAb+, HBsAb+, HBsAg- (immune due to natural infection)  HCV Ab-    Recommendations:  Continue Biktarvy  Will discuss Cabenuva with [facility] pharmacist and have them touch base with pt - not quite suppressed, but with lingering very low-level viremia while having mild AE to Biktarvy. Maybe possible to boost regimen with additional agent to get to suppression and then transition to Cabenuva?  HIV RNA, BMP, CBC ordered for today.  Completed Shingrix vaccination 10/11/24  Due for several vaccinations:  Meningococcus - needs dose #2 of MenQuadfi in mid/late December (dose #1 on 10/11/24)  Mpox - dose 2 now (does #1 on 10/11/24)  RTC in 3 months    Discussion:  50yo M with h/o HTN, DIANNE, insomnia, HLD, and recent HIV diagnosis here for routine HIV follow-up.    Due for dose #2 of Mpox vaccine now, and dose #2 of MenQuadfi later this month.    Will continue Biktarvy, and have ordered new HIV RNA, BMP, and CBC for routine monitoring.     Ho Angeles MD  Division of Infectious Diseases and International Medicine  P: [phone]     I spent at least 30 minutes on the day of this encounter on chart review, patient exam/interview, and note preparation.        History of Infectious Disease Illness:     50yo M with h/o HTN, DIANNE, insomnia, HLD, and recent HIV diagnosis here for routine follow-up.     Pt was started on Biktarvy at his initial visit and has been tolerating it well. Says he has  missed 1-2 doses. At that visit, he described having had sex with two individuals in [place] in February/March 2024 and then felt ill for 2 weeks with sore throat, body aches, and generalized fatigue. No injection drug use. He had been taking PrEP for awhile, but stopped in early 2024 when he ran out. Notes a history of MANDIE and cleared hepatitis B (both remote).     Travel history includes trips to [place], [place], and [place]. No known TB exposure. Never experienced homelessness or incarceration.     Physical Exam  General: Alert and no distress   Respiratory: No audible wheeze, cough, or shortness of breath   Psychiatric:  Appropriate affect, tone, and pace of words    Telephone-Visit Details    Type of service:  Telephone Visit   Call Start Time: 11:00am  Call End Time: 11:13am

## 2024-12-04 ENCOUNTER — VIRTUAL VISIT (OUTPATIENT)
Dept: INFECTIOUS DISEASES | Facility: CLINIC | Age: 49
End: 2024-12-04
Attending: STUDENT IN AN ORGANIZED HEALTH CARE EDUCATION/TRAINING PROGRAM
Payer: COMMERCIAL

## 2024-12-04 VITALS — BODY MASS INDEX: 29.69 KG/M2 | WEIGHT: 224 LBS | HEIGHT: 73 IN

## 2024-12-04 DIAGNOSIS — R11.0 NAUSEA: ICD-10-CM

## 2024-12-04 DIAGNOSIS — B20 HUMAN IMMUNODEFICIENCY VIRUS (HIV) DISEASE (H): ICD-10-CM

## 2024-12-04 PROCEDURE — 99442 PR PHYSICIAN TELEPHONE EVALUATION 11-20 MIN: CPT | Performed by: STUDENT IN AN ORGANIZED HEALTH CARE EDUCATION/TRAINING PROGRAM

## 2024-12-04 RX ORDER — ONDANSETRON 4 MG/1
4 TABLET, FILM COATED ORAL EVERY 8 HOURS PRN
Qty: 20 TABLET | Refills: 1 | Status: SHIPPED | OUTPATIENT
Start: 2024-12-04

## 2024-12-04 ASSESSMENT — PAIN SCALES - GENERAL: PAINLEVEL_OUTOF10: NO PAIN (0)

## 2024-12-04 NOTE — NURSING NOTE
Patient reviewed medications and allergies in Mychart during e-check in and said everything looked correct.      Current patient location: [address] [place] [address]    Is the patient currently in the state of [place]? YES    Visit mode:TELEPHONE    If the visit is dropped, the patient can be reconnected by:TELEPHONE VISIT: Phone number:   Telephone Information:   Mobile [phone]       Will anyone else be joining the visit? NO  (If patient encounters technical issues they should call [phone])    Are changes needed to the allergy or medication list? No    Are refills needed on medications prescribed by this physician? Discuss with provider    Rooming Documentation:  Unable to complete questionnaire(s) due to time    Reason for visit: RECHECK    Yolanda COLVINF

## 2024-12-04 NOTE — LETTER
12/4/2024       RE: Hector Ramirez  [address]  [facility] [address]     Dear Colleague,    Thank you for referring your patient, Hector Ramirez, to the [facility] INFECTI[facility] DISEASE CLINIC [place] at [facility]. Please see a copy of my visit note below.    [facility] Infectious Disease Clinic (TELEPHONE VISIT)  Dr. Ho Angeles, [facility] and [facility], Floor 3  [address] [place] [address]   Patient:  Hector Ramirez, Date of birth 1975, Medical record number [id number]  Date of Visit:  12/04/2024           Assessment and Recommendations:   ID Problem List:  Asymptomatic HIV  Latest HIV RNA (10/11/24) - 78 (down from 410 on 9/3/24)  Latest CD4 (6/12/24) - 364  HLAB*57:01 negative (6/18/24)  Toxoplasma IgG (6/12/24) - negative  CMV IgG (6/12/24) - 1.9  HAV Ab+  HBcAb+, HBsAb+, HBsAg- (immune due to natural infection)  HCV Ab-    Recommendations:  Continue Biktarvy  Will discuss Cabenuva with [facility] pharmacist and have them touch base with pt - not quite suppressed, but with lingering very low-level viremia while having mild AE to Biktarvy. Maybe possible to boost regimen with additional agent to get to suppression and then transition to Cabenuva?  HIV RNA, BMP, CBC ordered for today.  Completed Shingrix vaccination 10/11/24  Due for several vaccinations:  Meningococcus - needs dose #2 of MenQuadfi in mid/late December (dose #1 on 10/11/24)  Mpox - dose 2 now (does #1 on 10/11/24)  RTC in 3 months    Discussion:  50yo M with h/o HTN, DIANNE, insomnia, HLD, and recent HIV diagnosis here for routine HIV follow-up.    Due for dose #2 of Mpox vaccine now, and dose #2 of MenQuadfi later this month.    Will continue Biktarvy, and have ordered new HIV RNA, BMP, and CBC for routine monitoring.     Ho Angeles MD  Division of Infectious Diseases and International Medicine  P: [phone]     I spent at least 30 minutes on the day of this  encounter on chart review, patient exam/interview, and note preparation.        History of Infectious Disease Illness:     50yo M with h/o HTN, DIANNE, insomnia, HLD, and recent HIV diagnosis here for routine follow-up.     Pt was started on Biktarvy at his initial visit and has been tolerating it well. Says he has missed 1-2 doses. At that visit, he described having had sex with two individuals in [place] in February/March 2024 and then felt ill for 2 weeks with sore throat, body aches, and generalized fatigue. No injection drug use. He had been taking PrEP for awhile, but stopped in early 2024 when he ran out. Notes a history of MANDIE and cleared hepatitis B (both remote).     Travel history includes trips to [place], [place], and [place]. No known TB exposure. Never experienced homelessness or incarceration.     Physical Exam  General: Alert and no distress   Respiratory: No audible wheeze, cough, or shortness of breath   Psychiatric:  Appropriate affect, tone, and pace of words    Telephone-Visit Details    Type of service:  Telephone Visit   Call Start Time: 11:00am  Call End Time: 11:13am        Again, thank you for allowing me to participate in the care of your patient.      Sincerely,    Ho Angeles MD

## 2024-12-04 NOTE — PROGRESS NOTES
"Virtual Visit Details    Type of service:  Telephone Visit   Phone call duration: *** minutes   Originating Location (pt. Location): {patient location:699779::\"Home\"}  {PROVIDER LOCATION On-site should be selected for visits conducted from your clinic location or adjoining [facility] hospital, academic office, or other nearby [facility] building. Off-site should be selected for all other provider locations, including home:285862}  Distant Location (provider location):  {virtual location provider:285128}  "

## 2024-12-05 ENCOUNTER — TELEPHONE (OUTPATIENT)
Dept: INFECTIOUS DISEASES | Facility: CLINIC | Age: 49
End: 2024-12-05
Payer: COMMERCIAL

## 2024-12-05 NOTE — TELEPHONE ENCOUNTER
JACQUE VAZQUEZ 12/5 to sched a nurse visit (on [facility] schedule, NOT ID) in 2-3 weeks and sched a 3 month (around 3/4/25) follow up with Dr. Angeles per checkout notes from 12/4.     ----- Message from Iván HOFF sent at 12/4/2024 11:48 AM CST -----  Hello, please see scheduling request below from Dr. Angeles, please place on [facility] schedule.     Thanks!  Iván Donnelly RN  Infectious Disease on 12/4/2024 at 11:48 AM  ----- Message -----  From: Ho Angeles MD  Sent: 12/4/2024  11:33 AM CST  To: [facility] Infectious Disease Adult [facility] Judy Solano,    Could we get Mr. Ramirez in for a nurse visit to get dose #2 of both the Mpox and MenQuadfi vaccines in the next 2-3 weeks?    Thanks!  Avni

## 2024-12-09 ENCOUNTER — TELEPHONE (OUTPATIENT)
Dept: INFECTIOUS DISEASES | Facility: CLINIC | Age: 49
End: 2024-12-09
Payer: COMMERCIAL

## 2024-12-09 DIAGNOSIS — B20 HUMAN IMMUNODEFICIENCY VIRUS (HIV) DISEASE (H): ICD-10-CM

## 2024-12-09 DIAGNOSIS — F41.1 GAD (GENERALIZED ANXIETY DISORDER): ICD-10-CM

## 2024-12-09 DIAGNOSIS — R11.0 NAUSEA: ICD-10-CM

## 2024-12-09 NOTE — TELEPHONE ENCOUNTER
EP called 12/9 to sched a nurse visit in 2-3 weeks and sched 3 (4) month follow up with Dr. Angeles per provider/checkout notes from 12/4. Patient said that he'll be in [place] for this phone visit with Dr. Angeles.       ----- Message from Iván HOFF sent at 12/4/2024 11:48 AM CST -----  Hello, please see scheduling request below from Dr. Angeles, please place on [facility] schedule.     Thanks!  Iván Donnelly RN  Infectious Disease on 12/4/2024 at 11:48 AM  ----- Message -----  From: Ho Angeles MD  Sent: 12/4/2024  11:33 AM CST  To: [facility] Infectious Disease Adult [facility] Judy Solano,    Could we get Mr. Ramirez in for a nurse visit to get dose #2 of both the Mpox and MenQuadfi vaccines in the next 2-3 weeks?    Thanks!  Avni

## 2024-12-10 RX ORDER — ONDANSETRON 4 MG/1
4 TABLET, FILM COATED ORAL EVERY 8 HOURS PRN
Qty: 20 TABLET | Refills: 1 | OUTPATIENT
Start: 2024-12-10

## 2024-12-11 NOTE — TELEPHONE ENCOUNTER
Could someone reach out to this patient for alternative pharmacy?  I am getting an error and it wont let me e-prescribe to the pended pharmacy

## 2024-12-20 ENCOUNTER — LAB (OUTPATIENT)
Dept: LAB | Facility: CLINIC | Age: 49
End: 2024-12-20
Payer: COMMERCIAL

## 2024-12-20 DIAGNOSIS — B20 HUMAN IMMUNODEFICIENCY VIRUS (HIV) DISEASE (H): ICD-10-CM

## 2024-12-20 LAB
ANION GAP SERPL CALCULATED.3IONS-SCNC: 10 MMOL/L (ref 7–15)
BASOPHILS # BLD AUTO: 0 10E3/UL (ref 0–0.2)
BASOPHILS NFR BLD AUTO: 0 %
BUN SERPL-MCNC: 19.3 MG/DL (ref 6–20)
CALCIUM SERPL-MCNC: 9.6 MG/DL (ref 8.8–10.4)
CHLORIDE SERPL-SCNC: 100 MMOL/L (ref 98–107)
CREAT SERPL-MCNC: 1.14 MG/DL (ref 0.67–1.17)
EGFRCR SERPLBLD CKD-EPI 2021: 78 ML/MIN/1.73M2
EOSINOPHIL # BLD AUTO: 0 10E3/UL (ref 0–0.7)
EOSINOPHIL NFR BLD AUTO: 0 %
ERYTHROCYTE [DISTWIDTH] IN BLOOD BY AUTOMATED COUNT: 12.8 % (ref 10–15)
GLUCOSE SERPL-MCNC: 94 MG/DL (ref 70–99)
HCO3 SERPL-SCNC: 28 MMOL/L (ref 22–29)
HCT VFR BLD AUTO: 39.9 % (ref 40–53)
HGB BLD-MCNC: 14.4 G/DL (ref 13.3–17.7)
IMM GRANULOCYTES # BLD: 0 10E3/UL
IMM GRANULOCYTES NFR BLD: 0 %
LYMPHOCYTES # BLD AUTO: 1.3 10E3/UL (ref 0.8–5.3)
LYMPHOCYTES NFR BLD AUTO: 33 %
MCH RBC QN AUTO: 35 PG (ref 26.5–33)
MCHC RBC AUTO-ENTMCNC: 36.1 G/DL (ref 31.5–36.5)
MCV RBC AUTO: 97 FL (ref 78–100)
MONOCYTES # BLD AUTO: 0.3 10E3/UL (ref 0–1.3)
MONOCYTES NFR BLD AUTO: 8 %
NEUTROPHILS # BLD AUTO: 2.4 10E3/UL (ref 1.6–8.3)
NEUTROPHILS NFR BLD AUTO: 59 %
NRBC # BLD AUTO: 0 10E3/UL
NRBC BLD AUTO-RTO: 0 /100
PLATELET # BLD AUTO: 190 10E3/UL (ref 150–450)
POTASSIUM SERPL-SCNC: 4.2 MMOL/L (ref 3.4–5.3)
RBC # BLD AUTO: 4.12 10E6/UL (ref 4.4–5.9)
SODIUM SERPL-SCNC: 138 MMOL/L (ref 135–145)
WBC # BLD AUTO: 4 10E3/UL (ref 4–11)

## 2024-12-20 PROCEDURE — 85025 COMPLETE CBC W/AUTO DIFF WBC: CPT | Performed by: PATHOLOGY

## 2024-12-20 PROCEDURE — 99000 SPECIMEN HANDLING OFFICE-LAB: CPT | Performed by: PATHOLOGY

## 2024-12-20 PROCEDURE — 36415 COLL VENOUS BLD VENIPUNCTURE: CPT | Performed by: PATHOLOGY

## 2024-12-20 PROCEDURE — 80048 BASIC METABOLIC PNL TOTAL CA: CPT | Performed by: PATHOLOGY

## 2024-12-20 PROCEDURE — 87536 HIV-1 QUANT&REVRSE TRNSCRPJ: CPT | Performed by: STUDENT IN AN ORGANIZED HEALTH CARE EDUCATION/TRAINING PROGRAM

## 2024-12-21 LAB
HIV1 RNA # PLAS NAA DL=20: 89 COPIES/ML
HIV1 RNA SERPL NAA+PROBE-LOG#: 1.9 {LOG_COPIES}/ML

## 2025-01-27 DIAGNOSIS — K21.9 GASTROESOPHAGEAL REFLUX DISEASE WITHOUT ESOPHAGITIS: ICD-10-CM

## 2025-01-27 NOTE — TELEPHONE ENCOUNTER
Note from pharmacy:  The patient insurance [employer] requires a new prescription. The plan only covers this medication at a 90 day supply

## 2025-02-13 ENCOUNTER — TELEPHONE (OUTPATIENT)
Dept: INFECTIOUS DISEASES | Facility: CLINIC | Age: 50
End: 2025-02-13
Payer: COMMERCIAL

## 2025-02-13 NOTE — TELEPHONE ENCOUNTER
EP LVM 2/13 to resched 4/2 follow up with Dr. Angeles; provider not avail. Sched for next avail. --2nd attempt.

## 2025-03-18 NOTE — PROGRESS NOTES
[facility] Infectious Disease Clinic (TELEPHONE VISIT)  Dr. Ho Angeles, [facility], [address]  [address] [place] [address]   Patient:  Hector Ramirez, Date of birth 1975, Medical record number [id number]  Date of Visit:  03/19/2025           Assessment and Recommendations:   ID Problem List:  Asymptomatic HIV  Latest HIV RNA (12/20/24) - 89   Latest CD4 (6/12/24) - 364  HLAB*57:01 negative (6/18/24)  Toxoplasma IgG (6/12/24) - negative  CMV IgG (6/12/24) - 1.9  HAV Ab+  HBcAb+, HBsAb+, HBsAg- (immune due to natural infection)  HCV Ab-    Recommendations:  Continue Biktarvy  Will discuss Cabenuva with [facility] pharmacist and have them touch base with pt - not quite suppressed, but with lingering very low-level viremia while having mild AE to Biktarvy. Maybe possible to boost regimen with additional agent to get to suppression and then transition to Cabenuva?  HIV RNA, CMP, CBC ordered for today.  Will plan to recheck CD4 at next visit (one year from last check)  Once CMP results back, will consider GI referral for ongoing diarrhea, reports of occasional scant blood in stool  RTC in 3 months    Discussion:  48yo M with h/o HTN, DIANNE, insomnia, HLD, and asymptomatic HIV here for routine HIV follow-up.    Will continue Biktarvy, and have ordered new HIV RNA, BMP, and CBC for routine monitoring.     Ho Angeles MD  Division of Infectious Diseases and International Medicine  P: [phone]     I spent at least 40 minutes on the day of this encounter on chart review, patient exam/interview, and note preparation.        History of Infectious Disease Illness:     48yo M with h/o HTN, DIANNE, insomnia, HLD, and recent HIV diagnosis here for routine follow-up.     Pt was started on Biktarvy at his initial visit and has been tolerating it well. Says he has missed 1-2 doses. At that visit, he described having had sex with two individuals in [place] in February/March 2024 and then felt ill for  2 weeks with sore throat, body aches, and generalized fatigue. No injection drug use. He had been taking PrEP for awhile, but stopped in early 2024 when he ran out. Notes a history of MANDIE and cleared hepatitis B (both remote).     Travel history includes trips to [facility], [place], and [place]. No known TB exposure. Never experienced homelessness or incarceration.     Physical Exam  General: Alert and no distress   Respiratory: No audible wheeze, cough, or shortness of breath   Psychiatric:  Appropriate affect, tone, and pace of words    Telephone-Visit Details    Type of service:  Telephone Visit   Call Start Time: 10:30am  Call End Time: 10:44am

## 2025-03-19 ENCOUNTER — VIRTUAL VISIT (OUTPATIENT)
Dept: INFECTIOUS DISEASES | Facility: CLINIC | Age: 50
End: 2025-03-19
Attending: STUDENT IN AN ORGANIZED HEALTH CARE EDUCATION/TRAINING PROGRAM
Payer: COMMERCIAL

## 2025-03-19 VITALS — WEIGHT: 222 LBS | HEIGHT: 73 IN | BODY MASS INDEX: 29.42 KG/M2

## 2025-03-19 DIAGNOSIS — B20 HUMAN IMMUNODEFICIENCY VIRUS (HIV) DISEASE (H): Primary | ICD-10-CM

## 2025-03-19 ASSESSMENT — PAIN SCALES - GENERAL: PAINLEVEL_OUTOF10: MILD PAIN (2)

## 2025-03-19 NOTE — NURSING NOTE
Current patient location: [address] [place] [address]    Is the patient currently in the state of [place]? YES    Visit mode: TELEPHONE    If the visit is dropped, the patient can be reconnected by:TELEPHONE VISIT: Phone number:   Telephone Information:   Mobile [phone]       Will anyone else be joining the visit? NO  (If patient encounters technical issues they should call [phone])    Are changes needed to the allergy or medication list? No    Patient denies any changes since echeck-in completion and states all information entered during echeck-in remains accurate.    Are refills needed on medications prescribed by this physician? Discuss with provider    Rooming Documentation:  Questionnaire(s) completed    No other vitals to report today    Reason for visit: TASHA Jorge MA VVF

## 2025-03-19 NOTE — LETTER
3/19/2025       RE: Hector Ramirez  [address]  [facility] [address]     Dear Colleague,    Thank you for referring your patient, Hector Ramirez, to the [facility] INFECTIOUS DISEASE CLINIC [place] [facility]. Please see a copy of my visit note below.    [facility] Infectious Disease Clinic (TELEPHONE VISIT)  Dr. Ho Angeles, [facility] and [facility], Floor [address]  [address] [place] [address]   Patient:  Hector Ramirez, Date of birth 1975, Medical record number [id number]  Date of Visit:  03/19/2025           Assessment and Recommendations:   ID Problem List:  Asymptomatic HIV  Latest HIV RNA (12/20/24) - 89   Latest CD4 (6/12/24) - 364  HLAB*57:01 negative (6/18/24)  Toxoplasma IgG (6/12/24) - negative  CMV IgG (6/12/24) - 1.9  HAV Ab+  HBcAb+, HBsAb+, HBsAg- (immune due to natural infection)  HCV Ab-    Recommendations:  Continue Biktarvy  Will discuss Cabenuva with [facility] pharmacist and have them touch base with pt - not quite suppressed, but with lingering very low-level viremia while having mild AE to Biktarvy. Maybe possible to boost regimen with additional agent to get to suppression and then transition to Cabenuva?  HIV RNA, CMP, CBC ordered for today.  Will plan to recheck CD4 at next visit (one year from last check)  Once CMP results back, will consider GI referral for ongoing diarrhea, reports of occasional scant blood in stool  RTC in 3 months    Discussion:  48yo M with h/o HTN, DIANNE, insomnia, HLD, and asymptomatic HIV here for routine HIV follow-up.    Will continue Biktarvy, and have ordered new HIV RNA, BMP, and CBC for routine monitoring.     Ho Angeles MD  Division of Infectious Diseases and International Medicine  P: [phone]     I spent at least 40 minutes on the day of this encounter on chart review, patient exam/interview, and note preparation.        History of Infectious Disease Illness:     48yo M  with h/o HTN, DIANNE, insomnia, HLD, and recent HIV diagnosis here for routine follow-up.     Pt was started on Biktarvy at his initial visit and has been tolerating it well. Says he has missed 1-2 doses. At that visit, he described having had sex with two individuals in [place] in February/March 2024 and then felt ill for 2 weeks with sore throat, body aches, and generalized fatigue. No injection drug use. He had been taking PrEP for awhile, but stopped in early 2024 when he ran out. Notes a history of MANDIE and cleared hepatitis B (both remote).     Travel history includes trips to [facility], [place], and [place]. No known TB exposure. Never experienced homelessness or incarceration.     Physical Exam  General: Alert and no distress   Respiratory: No audible wheeze, cough, or shortness of breath   Psychiatric:  Appropriate affect, tone, and pace of words    Telephone-Visit Details    Type of service:  Telephone Visit   Call Start Time: 10:30am  Call End Time: 10:44am        Again, thank you for allowing me to participate in the care of your patient.      Sincerely,    Ho Angeles MD

## 2025-03-20 ENCOUNTER — TELEPHONE (OUTPATIENT)
Dept: INFECTIOUS DISEASES | Facility: CLINIC | Age: 50
End: 2025-03-20
Payer: COMMERCIAL

## 2025-03-20 NOTE — TELEPHONE ENCOUNTER
EP called 3/20 but patient said he'll sched online. To sched a 3 month (around 6/19) follow up with Dr. Angeles per checkout notes from 3/19.

## 2025-03-27 ENCOUNTER — TELEPHONE (OUTPATIENT)
Dept: INFECTIOUS DISEASES | Facility: CLINIC | Age: 50
End: 2025-03-27
Payer: COMMERCIAL

## 2025-03-27 DIAGNOSIS — B20 HUMAN IMMUNODEFICIENCY VIRUS (HIV) DISEASE (H): Primary | ICD-10-CM

## 2025-03-27 NOTE — TELEPHONE ENCOUNTER
Patient called MTM scheduling line to schedule specialty MTM appointment. An appointment was scheduled for infectious disease MTM. Will need the pended referral signed

## 2025-04-02 ENCOUNTER — VIRTUAL VISIT (OUTPATIENT)
Dept: PHARMACY | Facility: CLINIC | Age: 50
End: 2025-04-02
Payer: COMMERCIAL

## 2025-04-02 ENCOUNTER — TELEPHONE (OUTPATIENT)
Dept: INFECTIOUS DISEASES | Facility: CLINIC | Age: 50
End: 2025-04-02
Payer: COMMERCIAL

## 2025-04-02 DIAGNOSIS — G47.00 INSOMNIA, UNSPECIFIED TYPE: ICD-10-CM

## 2025-04-02 DIAGNOSIS — B20 HUMAN IMMUNODEFICIENCY VIRUS (HIV) DISEASE (H): Primary | ICD-10-CM

## 2025-04-02 DIAGNOSIS — F41.9 ANXIETY: ICD-10-CM

## 2025-04-02 DIAGNOSIS — I10 ESSENTIAL HYPERTENSION: ICD-10-CM

## 2025-04-02 DIAGNOSIS — K21.9 GASTROESOPHAGEAL REFLUX DISEASE WITHOUT ESOPHAGITIS: ICD-10-CM

## 2025-04-02 PROCEDURE — 99207 PR NO CHARGE LOS: CPT | Mod: 95 | Performed by: PHARMACIST

## 2025-04-02 RX ORDER — DARUNAVIR ETHANOLATE AND COBICISTAT 800; 150 MG/1; MG/1
1 TABLET, FILM COATED ORAL
Qty: 30 TABLET | Refills: 11 | Status: SHIPPED | OUTPATIENT
Start: 2025-04-02

## 2025-04-02 RX ORDER — PEDIATRIC MULTIVITAMIN NO.17
1 TABLET,CHEWABLE ORAL DAILY
COMMUNITY

## 2025-04-02 NOTE — PROGRESS NOTES
Dr. Angeles approved of adding Prezcobix to current Biktarvy. Will send out prescription.    Rehan Hoang, PharmD, BCGP  Medication Therapy Management Pharmacist  [facility] Infect[facility] Disease [facility]

## 2025-04-02 NOTE — Clinical Note
"Hi Dr. Angeles,  I met with patient today and discussed parameters to get started on Cabenuva (HIV RNA < 50 for a period of time) and he is okay with adding on a regimen with his Biktarvy to optimize therapy. I was thinking we can consider adding Prezcobix which can interact with metoprolol (increased lvls) and sertraline (decreased lvls), but nothing where we need to adjust doses. UpToDate has the recommendation for interactions as just to \"monitor therapy\".  Please let me know if you are okay with me prescribing Prezcobix as an add-on. I plan on following up with patient in 4 weeks to check in on tolerability at which point we can draw some HIV labs.  Thanks,  Rehan Hoang, PharmD, BCGP Medication Therapy Management Pharmacist [facility] Infect[facility] Disease [facility]  "

## 2025-04-02 NOTE — PROGRESS NOTES
Medication Therapy Management (MTM) Encounter    ASSESSMENT:                            Medication Adherence/Access: No issues identified.    HIV: Patient has expressed interest in starting Cabenuva, but will need to have 3 consecutive HIV RNA < 50 to qualify. He initially started at 442,000 at time of diagnosis then has fluctuated between 80s-100s since being on Biktarvy. There are no concerns with adherence at this time. Would benefit from adding on another agent, such as Prezcobix, until his HIV RNA < 50 in order to start process for Cabenuva enrollment. Of note, Prezcobix can decrease sertraline levels while increasing metoprolol levels so will need to monitor during next appointment check in.    Hypertension: Blood pressure readings have been elevated in the past. Hydrochlorothiazide was discontinued recently so he only takes losartan and metoprolol. He will benefit from monitoring his blood pressure at home with his new cuff machine as it will help better dictate therapy in the future. Can consider optimizing losartan dose if blood pressure readings are >140/90 mmHg chronically.     GERD: stable    Insomnia: stable     Mental Health/Anxiety: stable      PLAN:                            Continue taking Biktarvy once daily   Start Prezcobix once daily - MTM pharmacist will talk with Dr. Angeles and send out prescription to your pharmacy when approved  Start monitoring your blood pressure and record in a journal so that you can bring to your doctor appointments     Follow-up:   Appointments in Next Year      Apr 30, 2025 12:30 PM  Pharmacist Visit with Rehan Hoang RPH  [facility] and Infectious Diseases [facility] ([facility] ) [phone]     Jun 18, 2025 11:00 AM  (Arrive by 10:45 AM)  Return Patient with Ho Angeles MD  [facility] [place] ([facility] ) [phone]             SUBJECTIVE/OBJECTIVE:                          Hector Ramirez is a 49 year old male seen for an initial visit. He was referred to me from Ho Dejesus.      Reason for visit: Antiretroviral Therapy Optimization.    Allergies/ADRs: Reviewed in chart  Past Medical History: Reviewed in chart  Tobacco: He reports that he has never smoked. He has never used smokeless tobacco.  Alcohol: 10 or more beverages /week    Medication Adherence/Access: no issues reported.  Patient uses pill box(es).  Per patient, misses medication 1 time(s) per week.     HIV  Biktarvy 200-50-25 mg once daily   Ondansetron 4 mg as needed     Does not use ondansetron often, but it has been effective for nausea when needed.     Side effects: Reports occasional fatigue, headaches, nausea and diarrhea otherwise has been tolerating better  Diagnosis: 06/2024  Past regimens: none  Phenotype: ordered, but not completed  HIV VL: 100 (3/28/25)  CD4: 364 (06/2024)  Chlamydia: negative (6/12/24)  Gonorrhea: negative (6/12/24)  Treponema Ab: non-reactive (6/12/24)  Immunizations:  Flu (up to date), COVID (up to date), Tdap (up to date), PCV20 (up to date), HepB (immune, HepB core antibody positive), HepA (immune), Shingrix (up to date), MenACWY (up to date), Jynneos (up to date)    10-year ASCVD risk score: 6%     Hypertension   Losartan 50 mg once daily  Metoprolol succinate  mg once daily     Patient believes that metoprolol has been effective for tachycardia and helps him feel more calm. Recently obtained a home blood pressure cuff machine.    Patient reports no current medication side effects  Patient self monitors blood pressure.  Home BP monitoring will start monitoring soon       GERD    Omeprazole 20 mg once daily as needed    May take 1-2 doses per week. No reported issues at this time.           Mental Health   Anxiety/Insomnia  Sertraline 25 mg 2 times daily    discontinued by provider per patient Prazosin 1 mg at bedtime  "    Describes his current mood as \"okay\". Mood is affected seasonally and normally does better during spring/summer season. Normally stays home and watches Netflix due to current weather, but does meet with friends on weekends. Sleeps for 8 hrs nightly although he wakes up periodically throughout the night resulting in some occasional AM fatigue. Prefers to take only 1 mg of prazosin as it is effective without any discernible side effects.       Supplements:  Multivitamin once daily     Aware to spread dose with antiretroviral therapy. No reported issues at this time.     Today's Vitals: There were no vitals taken for this visit.  ----------------      I spent 29 minutes with this patient today. All changes were made via collaborative practice agreement with Ho Angeles.     A summary of these recommendations was sent via [email or web address].    Rehan Hoang, PharmD, BCGP  Medication Therapy Management Pharmacist  [facility]     Telemedicine Visit Details  The patient's medications can be safely assessed via a telemedicine encounter.  Type of service:  Video Conference via KrowdPad  Originating Location (pt. Location): Home    Distant Location (provider location):  On-site  Start Time:  12:31 pm  End Time:  1:00 pm     Medication Therapy Recommendations  Human immunodeficiency virus (HIV) disease (H)   1 Current Medication: bictegravir-emtricitabine-tenofovir (BIKTARVY) -25 MG per tablet   Current Medication Sig: Take 1 tablet by mouth daily   Rationale: Synergistic therapy - Needs additional medication therapy - Indication   Recommendation: Start Medication - Prezcobix 800-150 MG Tabs   Status: Accepted per Provider   Identified Date: 4/2/2025 Completed Date: 4/2/2025            "

## 2025-04-02 NOTE — PATIENT INSTRUCTIONS
"Recommendations from today's MTM visit:                                                    Continue taking Biktarvy once daily   Start Prezcobix once daily - MTM pharmacist will talk with Dr. Angeles and send out prescription to your pharmacy when approved  Start monitoring your blood pressure and record in a journal so that you can bring to your doctor appointments     Follow-up:   Appointments in Next Year      Apr 30, 2025 12:30 PM  Pharmacist Visit with Rehan Hoang RPH  [facility] and Infectious Diseases [facility] ([facility] ) [phone]     Jun 18, 2025 11:00 AM  (Arrive by 10:45 AM)  Return Patient with Ho Angeles MD  [facility] Infectious Disease [facility] ([facility] ) [phone]            It was great speaking with you today.  I value your experience and would be very thankful for your time in providing feedback in our clinic survey. In the next few days, you may receive an email or text message from [employer] with a link to a survey related to your  clinical pharmacist.\"     To schedule another MTM appointment, please call the clinic directly or you may call the MTM scheduling line at [phone] or toll-free at [phone].     My Clinical Pharmacist's contact information:                                                      Please feel free to contact me with any questions or concerns you have.      Rehan Hoang, PharmD, BCGP  Medication Therapy Management Pharmacist  [facility] Infectious Disease [facility]     "

## 2025-04-03 ENCOUNTER — MYC MEDICAL ADVICE (OUTPATIENT)
Dept: INFECTIOUS DISEASES | Facility: CLINIC | Age: 50
End: 2025-04-03
Payer: COMMERCIAL

## 2025-04-04 NOTE — TELEPHONE ENCOUNTER
Unable to locate forms, not in Provider folder or in RIGHT FAX. Sent My Chart message for Patient to have employer refax forms to [phone].     Barbie Akers,[facility]  Rheumatology & ID  [address], [place] [id number] [phone]

## 2025-04-07 ENCOUNTER — PATIENT OUTREACH (OUTPATIENT)
Dept: CARE COORDINATION | Facility: CLINIC | Age: 50
End: 2025-04-07
Payer: COMMERCIAL

## 2025-04-07 NOTE — TELEPHONE ENCOUNTER
PA Initiation    Medication: PREZCOBIX 800-150 MG PO TABS  Insurance Company: [employer] - Phone [phone] Fax [phone]  Pharmacy Filling the Rx: [facility] ([facility] #[id number] - [place], [place] - [address]  Filling Pharmacy Phone: [phone]  Filling Pharmacy Fax: [phone]  Start Date: 4/7/2025

## 2025-04-07 NOTE — TELEPHONE ENCOUNTER
Prior Authorization Approval    Medication: PREZCOBIX 800-150 MG PO TABS  Authorization Effective Date: 3/8/2025  Authorization Expiration Date: 4/7/2026  Approved Dose/Quantity: 90/90 days  Reference #: , [id number]   Insurance Company: [employer] - Phone [phone] Fax [phone]  Expected CoPay: $    CoPay Card Available:      Financial Assistance Needed:   Which Pharmacy is filling the prescription: [facility] ([facility]) #[id number] - [place], [place] - [address] [facility] [address]  Pharmacy Notified: Yes  Patient Notified: No, [facility] will reach out to patient

## 2025-04-08 NOTE — TELEPHONE ENCOUNTER
Printed forms, placed in Provider folder to sign.     Barbie Akers,CMA  Rheumatology & ID  [address], [place] [id number] [phone]

## 2025-04-09 NOTE — TELEPHONE ENCOUNTER
Forms completed and faxed to [phone].  Patient Informed via my chart message.    Barbie Akers,[facility]  Rheumatology & ID  [address] [place] [address]  [phone]

## 2025-04-10 NOTE — TELEPHONE ENCOUNTER
Copy of forms have been mailed to Patient at their request.     Barbie Akers,[facility]  Rheumatology & ID  [address] [place] [address] [phone]

## 2025-05-07 NOTE — PROGRESS NOTES
Medication Therapy Management (MTM) Encounter    ASSESSMENT:                            Medication Adherence/Access: No issues identified.    HIV:   Patient has expressed interest in starting Cabenuva, but will need to have 3 consecutive HIV RNA < 50 to qualify. He initially started at 442,000 at time of diagnosis then has fluctuated between 80s-100s since being on Biktarvy. He has started Prezcobix and been adherent. Would benefit a HIV RNA recheck soon to gauge efficacy of new regimen.Of note, Prezcobix can decrease sertraline levels while increasing metoprolol levels so will need to monitor during next appointment check in.      Hypertension:   Blood pressure remains elevated >140s for systolic. He has decreased dose of metoprolol succinate on his own due to side effects of fatigue which is likely due to Prezcobix drug interaction with the beta blocker. Can consider titrating losartan dose to 100 mg once daily instead to better manage his blood pressure.       Mental Health   Anxiety:  Stable. Prezcobix can decrease sertraline and reduce its effectiveness, but patient has been stable.    PLAN:                              Continue taking Biktarvy once daily   Continue taking Tivicay once daily   Recheck HIV viral load and basic metabolic panel in the next 2-3 weeks  Increase losartan dose to 100 mg once daily - MTM pharmacist will discuss provider about this recommendation     Follow-up: MTM Pharmacist in 6 weeks, ID provider on 6/18/25        SUBJECTIVE/OBJECTIVE:                          Hector Ramirez is a 49 year old male seen for a follow up visit.     Reason for visit: Infectious Disease  - Medication Therapy Management.    Allergies/ADRs: Reviewed in chart  Past Medical History: Reviewed in chart  Tobacco: He reports that he has never smoked. He has never used smokeless tobacco.  Alcohol: 10 or more beverages /week    Medication Adherence/Access: no issues reported.  Patient uses pill box(es).  Per patient,  "misses medication 1 time(s) per week.     HIV  Biktarvy 200-50-25 mg once daily   Prezcobix 800-150 mg once daily with meals    Side effects: Reports nausea initially when starting Prezcobix, but has since subsided.  Past regimens: none  Phenotype: ordered, but not completed  HIV VL: 100 (3/28/25)  CD4: 364 (06/2024)  Chlamydia: negative (6/12/24)  Gonorrhea: negative (6/12/24)  Treponema Ab: non-reactive (6/12/24)  Immunizations:  Flu (up to date), COVID (up to date), Tdap (up to date), PCV20 (up to date), HepB (immune, HepB core antibody positive), HepA (immune), Shingrix (up to date), MenACWY (up to date), Jynneos (up to date)       Hypertension   Losartan 50 mg once daily  Metoprolol succinate  mg once daily   Patient believes that metoprolol has been effective for tachycardia and helps him feel more calm, but has developed fatigue and grogginess from metoprolol.    Patient reports no current medication side effects  Patient self monitors blood pressure.  Home BP monitoring averages 140s/80s mmHg, last reading was 142/84 mmHg,            Mental Health   Anxiety  Sertraline 25 mg 2 times daily  Stress level has been higher lately since he was laid off from work. 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 Describes his current mood as \"okay\". Mood is affected seasonally and normally does better during spring/summer season. Normally stays home and watches [facility] due to current weather, but does meet with friends on weekends. Sleeps for 8 hrs nightly although he wakes up periodically throughout the night resulting in some occasional AM fatigue. Prefers to take only 1 mg of prazosin as it is effective without any discernible side effects.         Today's Vitals: There were no vitals taken for this visit.  ----------------          I spent 15 minutes with this patient today. All changes were made via collaborative practice agreement with Dr. Angeles.     A summary of these recommendations was " sent via [employer].      Rehan Hoang, PharmD, BCGP  Medication Therapy Management Pharmacist  [facility] Infectious Disease Clinic       Telemedicine Visit Details  The patient's medications can be safely assessed via a telemedicine encounter.  Type of service:  Telephone visit  Originating Location (pt. Location): Home    Distant Location (provider location):  On-site  Start Time: 12:32 AM  End Time: 12:47 PM     Medication Therapy Recommendations  Essential hypertension   1 Current Medication: losartan (COZAAR) 50 MG tablet   Current Medication Sig: Take 1 tablet (50 mg) by mouth every morning.   Rationale: Dose too low - Dosage too low - Effectiveness   Recommendation: Increase Dose - losartan 100 MG tablet   Status: Contact Provider - Awaiting Response   Identified Date: 5/8/2025

## 2025-05-08 ENCOUNTER — PATIENT OUTREACH (OUTPATIENT)
Dept: CARE COORDINATION | Facility: CLINIC | Age: 50
End: 2025-05-08
Payer: COMMERCIAL

## 2025-05-08 ENCOUNTER — VIRTUAL VISIT (OUTPATIENT)
Dept: PHARMACY | Facility: CLINIC | Age: 50
End: 2025-05-08
Payer: COMMERCIAL

## 2025-05-08 DIAGNOSIS — B20 HUMAN IMMUNODEFICIENCY VIRUS (HIV) DISEASE (H): Primary | ICD-10-CM

## 2025-05-08 DIAGNOSIS — F41.9 ANXIETY: ICD-10-CM

## 2025-05-08 DIAGNOSIS — I10 ESSENTIAL HYPERTENSION: ICD-10-CM

## 2025-05-08 NOTE — Clinical Note
Bon Dockery,  My name is Rehan and I am the MTM pharmacist in the infectious disease clinic. I had the pleasure of meeting your patient today and reviewed his medications. He has self-decreased his metoprolol succinate to 50 mg QD given poor toleration since starting Prezcobix and blood pressure has been in the 140s mmHg. I was thinking we could consider increasing losartan dose from 50 mg to 100 mg for additional management of his blood pressure.  Please let me know if you'd like prescribe losartan 1000 mg once daily and I can send it out for patient.  Thanks,  Rehan Hoang, PharmD, BCGP Medication Therapy Management Pharmacist [facility] Infectious Disease Clinic

## 2025-05-08 NOTE — PATIENT INSTRUCTIONS
"Recommendations from today's MTM visit:                                                      Continue taking Biktarvy once daily   Continue taking Tivicay once daily   Recheck HIV viral load and basic metabolic panel in the next 2-3 weeks  Increase losartan dose to 100 mg once daily - MTM pharmacist will discuss provider about this recommendation     Follow-up: MTM Pharmacist in 6 weeks, ID provider on 6/18/25    It was great speaking with you today.  I value your experience and would be very thankful for your time in providing feedback in our clinic survey. In the next few days, you may receive an email or text message from [employer] with a link to a survey related to your  clinical pharmacist.\"     To schedule another MTM appointment, please call the clinic directly or you may call the MTM scheduling line at [phone] or toll-free at [phone].     My Clinical Pharmacist's contact information:                                                      Please feel free to contact me with any questions or concerns you have.      Rehan Hoang, PharmD, BCGP  Medication Therapy Management Pharmacist  [facility] Infectious Disease Clinic      "

## 2025-05-08 NOTE — Clinical Note
Bon Holly,  I had the pleasure of meeting your patient today and check in on his recent Prezcobix. He is tolerating medication well and I will put in lab work (viral load) for him to recheck in a couple weeks. If we are able to get a few additional readings under 50 then we can transition him to Cabenuva injections in the future. I do plan on reaching out to his PCP to ask if we can increase losartan dose since his BP is still elevated >140s mmHg.   Thanks,  Rehan Hoang, PharmD, BCGP Medication Therapy Management Pharmacist [facility] Infectious Disease Clinic

## 2025-05-09 DIAGNOSIS — I10 ESSENTIAL HYPERTENSION: ICD-10-CM

## 2025-05-10 RX ORDER — METOPROLOL SUCCINATE 100 MG/1
100 TABLET, EXTENDED RELEASE ORAL EVERY EVENING
Qty: 30 TABLET | Refills: 0 | Status: SHIPPED | OUTPATIENT
Start: 2025-05-10

## 2025-05-13 ENCOUNTER — PATIENT OUTREACH (OUTPATIENT)
Dept: CARE COORDINATION | Facility: CLINIC | Age: 50
End: 2025-05-13
Payer: COMMERCIAL

## 2025-05-13 DIAGNOSIS — B20 HUMAN IMMUNODEFICIENCY VIRUS (HIV) DISEASE (H): ICD-10-CM

## 2025-05-13 DIAGNOSIS — R11.0 NAUSEA: ICD-10-CM

## 2025-05-13 RX ORDER — ONDANSETRON 4 MG/1
4 TABLET, FILM COATED ORAL EVERY 8 HOURS PRN
Qty: 20 TABLET | Refills: 1 | Status: SHIPPED | OUTPATIENT
Start: 2025-05-13

## 2025-05-13 NOTE — TELEPHONE ENCOUNTER
Pending Prescriptions:                       Disp   Refills    ondansetron (ZOFRAN) 4 MG tablet          20 tab*1            Sig: Take 1 tablet (4 mg) by mouth every 8 hours as           needed for nausea.

## 2025-05-22 ENCOUNTER — PATIENT OUTREACH (OUTPATIENT)
Dept: CARE COORDINATION | Facility: CLINIC | Age: 50
End: 2025-05-22
Payer: COMMERCIAL

## 2025-06-04 ENCOUNTER — LAB (OUTPATIENT)
Dept: LAB | Facility: CLINIC | Age: 50
End: 2025-06-04
Payer: COMMERCIAL

## 2025-06-04 DIAGNOSIS — B20 HUMAN IMMUNODEFICIENCY VIRUS (HIV) DISEASE (H): ICD-10-CM

## 2025-06-04 DIAGNOSIS — Z13.6 SCREENING FOR CARDIOVASCULAR CONDITION: Primary | ICD-10-CM

## 2025-06-04 DIAGNOSIS — E78.00 HIGH CHOLESTEROL: ICD-10-CM

## 2025-06-04 LAB
ANION GAP SERPL CALCULATED.3IONS-SCNC: 16 MMOL/L (ref 7–15)
BUN SERPL-MCNC: 15.6 MG/DL (ref 6–20)
CALCIUM SERPL-MCNC: 9.4 MG/DL (ref 8.8–10.4)
CHLORIDE SERPL-SCNC: 102 MMOL/L (ref 98–107)
CHOLEST SERPL-MCNC: 345 MG/DL
CREAT SERPL-MCNC: 0.99 MG/DL (ref 0.67–1.17)
EGFRCR SERPLBLD CKD-EPI 2021: >90 ML/MIN/1.73M2
FASTING STATUS PATIENT QL REPORTED: ABNORMAL
FASTING STATUS PATIENT QL REPORTED: ABNORMAL
GLUCOSE SERPL-MCNC: 112 MG/DL (ref 70–99)
HCO3 SERPL-SCNC: 23 MMOL/L (ref 22–29)
HDLC SERPL-MCNC: 64 MG/DL
LDLC SERPL CALC-MCNC: ABNORMAL MG/DL
LDLC SERPL DIRECT ASSAY-MCNC: 206 MG/DL
NONHDLC SERPL-MCNC: 281 MG/DL
POTASSIUM SERPL-SCNC: 4.2 MMOL/L (ref 3.4–5.3)
SODIUM SERPL-SCNC: 141 MMOL/L (ref 135–145)
TRIGL SERPL-MCNC: 545 MG/DL

## 2025-06-04 PROCEDURE — 86359 T CELLS TOTAL COUNT: CPT

## 2025-06-04 PROCEDURE — 83721 ASSAY OF BLOOD LIPOPROTEIN: CPT | Mod: 59

## 2025-06-04 PROCEDURE — 80048 BASIC METABOLIC PNL TOTAL CA: CPT

## 2025-06-04 PROCEDURE — 80061 LIPID PANEL: CPT

## 2025-06-04 PROCEDURE — 87536 HIV-1 QUANT&REVRSE TRNSCRPJ: CPT

## 2025-06-04 PROCEDURE — 86360 T CELL ABSOLUTE COUNT/RATIO: CPT

## 2025-06-05 ENCOUNTER — RESULTS FOLLOW-UP (OUTPATIENT)
Dept: PHARMACY | Facility: CLINIC | Age: 50
End: 2025-06-05

## 2025-06-05 LAB
CD3 CELLS # BLD: 1063 CELLS/UL (ref 603–2990)
CD3 CELLS NFR BLD: 78 % (ref 49–84)
CD3+CD4+ CELLS # BLD: 406 CELLS/UL (ref 441–2156)
CD3+CD4+ CELLS NFR BLD: 30 % (ref 28–63)
CD3+CD4+ CELLS/CD3+CD8+ CLL BLD: 0.6 % (ref 1.4–2.6)
CD3+CD8+ CELLS # BLD: 675 CELLS/UL (ref 125–1312)
CD3+CD8+ CELLS NFR BLD: 50 % (ref 10–40)
HIV1 RNA # PLAS NAA DL=20: 123 COPIES/ML (ref ?–1)
HIV1 RNA SERPL NAA+PROBE-LOG#: 2.1 {LOG_COPIES}/ML
T CELL COMMENT: ABNORMAL

## 2025-06-25 ENCOUNTER — PATIENT OUTREACH (OUTPATIENT)
Dept: INFECTIOUS DISEASES | Facility: CLINIC | Age: 50
End: 2025-06-25
Payer: COMMERCIAL

## 2025-06-25 DIAGNOSIS — F41.1 GAD (GENERALIZED ANXIETY DISORDER): ICD-10-CM

## 2025-06-25 DIAGNOSIS — I10 ESSENTIAL HYPERTENSION: ICD-10-CM

## 2025-06-25 NOTE — TELEPHONE ENCOUNTER
Social Work - Telephone/MyChart message  [facility]  Data: 2025  Patient Name: Hector Ramirez  Goes By: Hector ROBBINS/Age: 1975 (50 year old)      Reason for Referral: Missed Appt     Intervention: Writer called Patient to check in, assess for barriers, and encourage rescheduling. Voicemail left.   Plan:  will await patient's return phone call/message and provide assistance at that time.      SAMIR Kate, [facility]  Infectious Disease

## 2025-06-25 NOTE — PROGRESS NOTES
Medication Therapy Management (MTM) Encounter    ASSESSMENT:                            Medication Adherence/Access: No issues identified.    HIV:   Patient has expressed interest in starting Cabenuva, but will need to have 3 consecutive HIV RNA < 50 to qualify. He initially started at 442,000 at time of diagnosis then has fluctuated between 80s-100s since being on Biktarvy.  More recently viral load increased to 120s for unknown reasons. Reviewed importance of having food with Prezcobix to which patient expressed understanding.Of note, Prezcobix can decrease sertraline levels while increasing metoprolol levels so will need to monitor periodically.      Hypertension:   Patient has been out of his losartan and PCP prefers to refill once they meet for their annual visit.  Historically, patient has had elevated blood pressure readings.  He has not been tolerating metoprolol 100 mg well so in the past reduced it on his own to 50 mg instead.  Previously discussed with PCP about increasing losartan dose to 100 mg daily instead.  Defer treatment to primary care clinic.        PLAN:                              Continue taking Biktarvy once daily   Continue taking Prezcobix once daily with meals  Recheck HIV viral load in 4-6 weeks  Refill your losartan and discuss with your doctor about going to 100 mg dose - I have mentioned this to your PCP in the past too    Follow-up: MTM Pharmacist in 2 months, please schedule an appointment with your ID provider soon        SUBJECTIVE/OBJECTIVE:                          Hector Ramirez is a 49 year old male seen for a follow up visit.     Reason for visit: Infectious Disease  - Medication Therapy Management.    Allergies/ADRs: Reviewed in chart  Past Medical History: Reviewed in chart  Tobacco: He reports that he has never smoked. He has never used smokeless tobacco.  Alcohol: 10 or more beverages /week    Medication Adherence/Access: no issues reported.      HIV  Biktarvy 200-50-25 mg  once daily   Prezcobix 800-150 mg once daily with meals    Side effects: tolerating well  Past regimens: none  Phenotype: ordered, but not completed - viral load is less than 500 so not likely  HIV VL: 123 (6/4/25)  CD4: 406 (06/2025)  Chlamydia: negative (6/12/24)  Gonorrhea: negative (6/12/24)  Treponema Ab: non-reactive (6/12/24)  Immunizations:  Flu (up to date), COVID (up to date), Tdap (up to date), PCV20 (up to date), HepB (immune, HepB core antibody positive), HepA (immune), Shingrix (up to date), MenACWY (up to date), Jynneos (up to date)       Hypertension   Losartan 50 mg once daily  Metoprolol succinate ER 50 mg once daily   Patient believes that metoprolol has been effective for tachycardia and helps him feel more calm, but has developed fatigue and grogginess from metoprolol so lowered dose to 50 mg on his own. Has been out of losartan for 1 week and has to meet with PCP on 7/2/25 for refills. Endorses recent IBS-related chest discomfort and headaches. Denies sudden changes in vision.    Patient reports no current medication side effects  Patient self monitors blood pressure.  Home BP monitoring averages 140s/80s mmHg, last reading was 142/84 mmHg,              Today's Vitals: There were no vitals taken for this visit.  ----------------      I spent 11 minutes with this patient today. All changes were made via collaborative practice agreement with Dr. Angeles.     A summary of these recommendations was sent via Seaw[employer].      Rehan Hoang, PharmD, BCGP  Medication Therapy Management Pharmacist  [facility] Infectious Disease Clinic       Telemedicine Visit Details  The patient's medications can be safely assessed via a telemedicine encounter.  Type of service:  Telephone visit  Originating Location (pt. Location): Home    Distant Location (provider location):  Off-site  Start Time: 12:32 PM  End Time: 12:43 PM     Medication Therapy Recommendations  No medication therapy recommendations to display

## 2025-06-26 ENCOUNTER — VIRTUAL VISIT (OUTPATIENT)
Dept: PHARMACY | Facility: CLINIC | Age: 50
End: 2025-06-26
Payer: COMMERCIAL

## 2025-06-26 DIAGNOSIS — B20 HUMAN IMMUNODEFICIENCY VIRUS (HIV) DISEASE (H): Primary | ICD-10-CM

## 2025-06-26 DIAGNOSIS — I10 ESSENTIAL HYPERTENSION: ICD-10-CM

## 2025-06-26 RX ORDER — LOSARTAN POTASSIUM 50 MG/1
TABLET ORAL
Qty: 90 TABLET | OUTPATIENT
Start: 2025-06-26

## 2025-06-26 RX ORDER — LOSARTAN POTASSIUM 50 MG/1
50 TABLET ORAL EVERY MORNING
Qty: 30 TABLET | Refills: 0 | Status: SHIPPED | OUTPATIENT
Start: 2025-06-26

## 2025-06-26 NOTE — Clinical Note
Bon Holly,  I met with patient today and we are going to monitor his viral load during our next visit. His viral load went up slightly, but CD4 did improve since being on Prezcobix. He had not been taking Prezcobix with food which may have led to poor absorption of medication. He is aware to schedule his next appointment with you soon.  Thanks,  Rehan Hoang, PharmD, BCGP Medication Therapy Management Pharmacist [facility] Infectious Disease Clinic

## 2025-06-26 NOTE — PATIENT INSTRUCTIONS
"Recommendations from today's MTM visit:                                                    MTM (medication therapy management) is a service provided by a clinical pharmacist designed to help you get the most of out of your medicines.   Today we reviewed what your medicines are for, how to know if they are working, that your medicines are safe and how to make your medicine regimen as easy as possible.      Continue taking Biktarvy once daily   Continue taking Prezcobix once daily with meals  Recheck HIV viral load in 4-6 weeks  Refill your losartan and discuss with your doctor about going to 100 mg dose - I have mentioned this to your PCP in the past too    Follow-up: MTM Pharmacist in 2 months, please schedule an appointment with your ID provider soon    It was great speaking with you today.  I value your experience and would be very thankful for your time in providing feedback in our clinic survey. In the next few days, you may receive an email or text message from [employer] with a link to a survey related to your  clinical pharmacist.\"     To schedule another MTM appointment, please call the clinic directly or you may call the MTM scheduling line at [phone] or toll-free at [phone].     My Clinical Pharmacist's contact information:                                                      Please feel free to contact me with any questions or concerns you have.      Rehan Hoang, PharmD, BCGP  Medication Therapy Management Pharmacist  [facility] Infectious Disease Clinic    "

## 2025-06-26 NOTE — Clinical Note
Bon Dockery,  I met with patient again today and he mentioned that he has an upcoming appointment with you to get refills on his medications. Of note, he has been out of his losartan for 7 days and has not checked his blood pressure. Endorses recent IBS-related chest discomfort and headaches, but no changes in vision. No recommendations other than previously discussed about considering losartan 100 mg once daily, but that can be decided during your next appointment with him.  Thanks!  Rehan Hoang, PharmD, BCGP Medication Therapy Management Pharmacist [facility]

## 2025-07-31 ENCOUNTER — LAB (OUTPATIENT)
Dept: LAB | Facility: CLINIC | Age: 50
End: 2025-07-31
Payer: COMMERCIAL

## 2025-07-31 DIAGNOSIS — B20 HUMAN IMMUNODEFICIENCY VIRUS (HIV) DISEASE (H): ICD-10-CM

## 2025-08-06 ENCOUNTER — MYC REFILL (OUTPATIENT)
Dept: FAMILY MEDICINE | Facility: CLINIC | Age: 50
End: 2025-08-06
Payer: COMMERCIAL

## 2025-08-06 DIAGNOSIS — I10 ESSENTIAL HYPERTENSION: ICD-10-CM

## 2025-08-06 RX ORDER — METOPROLOL SUCCINATE 100 MG/1
100 TABLET, EXTENDED RELEASE ORAL EVERY EVENING
Qty: 30 TABLET | Refills: 0 | Status: CANCELLED | OUTPATIENT
Start: 2025-08-06

## 2025-08-25 DIAGNOSIS — I10 ESSENTIAL HYPERTENSION: ICD-10-CM

## 2025-08-26 ENCOUNTER — VIRTUAL VISIT (OUTPATIENT)
Dept: PHARMACY | Facility: CLINIC | Age: 50
End: 2025-08-26
Payer: COMMERCIAL

## 2025-08-26 DIAGNOSIS — B20 HUMAN IMMUNODEFICIENCY VIRUS (HIV) DISEASE (H): ICD-10-CM

## 2025-08-26 DIAGNOSIS — I10 ESSENTIAL HYPERTENSION: Primary | ICD-10-CM

## 2025-08-26 RX ORDER — LOSARTAN POTASSIUM 50 MG/1
TABLET ORAL
Qty: 90 TABLET | Refills: 0 | OUTPATIENT
Start: 2025-08-26

## 2025-08-26 RX ORDER — METOPROLOL SUCCINATE 100 MG/1
100 TABLET, EXTENDED RELEASE ORAL EVERY EVENING
Qty: 90 TABLET | Refills: 0 | Status: SHIPPED | OUTPATIENT
Start: 2025-08-26

## 2025-08-27 ENCOUNTER — TELEPHONE (OUTPATIENT)
Dept: INFECTIOUS DISEASES | Facility: CLINIC | Age: 50
End: 2025-08-27

## 2025-09-03 ENCOUNTER — VIRTUAL VISIT (OUTPATIENT)
Dept: INFECTIOUS DISEASES | Facility: CLINIC | Age: 50
End: 2025-09-03
Attending: STUDENT IN AN ORGANIZED HEALTH CARE EDUCATION/TRAINING PROGRAM
Payer: COMMERCIAL

## 2025-09-03 VITALS — BODY MASS INDEX: 29.42 KG/M2 | WEIGHT: 222 LBS | HEIGHT: 73 IN

## 2025-09-03 DIAGNOSIS — Z21 HIV ANTIBODY POSITIVE (H): ICD-10-CM

## 2025-09-03 DIAGNOSIS — B20 HIV-1 INFECTION, SYMPTOMATIC (H): ICD-10-CM

## 2025-09-03 ASSESSMENT — PAIN SCALES - GENERAL: PAINLEVEL_OUTOF10: NO PAIN (0)
